# Patient Record
Sex: MALE | Race: WHITE | NOT HISPANIC OR LATINO | Employment: OTHER | URBAN - METROPOLITAN AREA
[De-identification: names, ages, dates, MRNs, and addresses within clinical notes are randomized per-mention and may not be internally consistent; named-entity substitution may affect disease eponyms.]

---

## 2017-01-02 ENCOUNTER — THERAPY VISIT (OUTPATIENT)
Dept: PHYSICAL THERAPY | Facility: CLINIC | Age: 72
End: 2017-01-02
Payer: MEDICARE

## 2017-01-02 DIAGNOSIS — M25.561 ACUTE PAIN OF RIGHT KNEE: Primary | ICD-10-CM

## 2017-01-02 DIAGNOSIS — Z47.89 AFTERCARE FOLLOWING SURGERY OF THE MUSCULOSKELETAL SYSTEM: ICD-10-CM

## 2017-01-02 PROCEDURE — 97140 MANUAL THERAPY 1/> REGIONS: CPT | Mod: GP | Performed by: PHYSICAL THERAPY ASSISTANT

## 2017-01-02 PROCEDURE — 97110 THERAPEUTIC EXERCISES: CPT | Mod: GP | Performed by: PHYSICAL THERAPY ASSISTANT

## 2017-01-07 ENCOUNTER — OFFICE VISIT (OUTPATIENT)
Dept: URGENT CARE | Facility: URGENT CARE | Age: 72
End: 2017-01-07
Payer: MEDICARE

## 2017-01-07 VITALS
OXYGEN SATURATION: 99 % | HEART RATE: 83 BPM | TEMPERATURE: 98 F | BODY MASS INDEX: 27.22 KG/M2 | WEIGHT: 179 LBS | SYSTOLIC BLOOD PRESSURE: 138 MMHG | DIASTOLIC BLOOD PRESSURE: 88 MMHG

## 2017-01-07 DIAGNOSIS — H16.9 KERATITIS: Primary | ICD-10-CM

## 2017-01-07 DIAGNOSIS — H04.123 DRY EYES: ICD-10-CM

## 2017-01-07 PROCEDURE — 99213 OFFICE O/P EST LOW 20 MIN: CPT | Mod: 24 | Performed by: FAMILY MEDICINE

## 2017-01-07 RX ORDER — POLYMYXIN B SULFATE AND TRIMETHOPRIM 1; 10000 MG/ML; [USP'U]/ML
SOLUTION OPHTHALMIC
Qty: 1 BOTTLE | Refills: 0 | Status: SHIPPED | OUTPATIENT
Start: 2017-01-07 | End: 2017-08-15

## 2017-01-07 NOTE — PROGRESS NOTES
SUBJECTIVE:                                                    Taqueria Bone is a 71 year old male who presents to clinic today for the following health issues:      Eye(s) Problem      Duration: 2 days    Description:  Location: right  Pain: YES  Redness: YES  Discharge: YES    Accompanying signs and symptoms: none    History (Trauma, foreign body exposure,): None    Precipitating or alleviating factors (contact use): None    Therapies tried and outcome: lubricant drops with no relief      2 days ago woke up with watery right eye  Patient used lubricant drops  esterday morning seemed to be fine except for dry crusty in the morning  Last night woke up in the middle of the night and the tears were just streaming down   Tried the eyedrops and it actually burned  Patient sat up tried a hot compress  Went back to bed and fell back asleep    Retired  Couple of weeks ago a friend and him was welding some brackets of a running board.  slight photophobia  question feeling of foreign body    denies blurring of vision  denies URI symptoms  Tried supportive treatment no relief  Worsening symptoms hence came in    Problem list, Medication list, Allergies, and Medical/Social/Surgical histories reviewed in [facility] and updated as appropriate.    ROS:  General: negative for fever  EYE: as above  No fevers or chills chest pain or shortness of breath     OBJECTIVE:  /88 mmHg  Pulse 83  Temp(Src) 98  F (36.7  C) (Tympanic)  Wt 179 lb (81.194 kg)  SpO2 99%   General : Awake Alert not in any acute cardiorespiratory distress  Head:       Normocephalic Atraumatic  Eyes:    Pupils equally reactive to light and accomodation. Sclera not icteric. Extra occular muscles intact full and equal. No hyphema, no hypopyon, no ciliary flush. No eyelid swelling or periorbital cellulitis. Mild erythema of  right  conjunctiva.   On fluoroscein dye stanining ? Faint uptake around the limbus of cornea all around   Neurologic: No cranial  nerve deficits.   Psych: Appropriate mood and affect. Pleasant  Skin: patient undressed to level of his/her comfort. No visible concerning lesions.      ASSESSMENT:  No diagnosis found.      ICD-10-CM    1. Keratitis H16.9 trimethoprim-polymyxin b (POLYTRIM) ophthalmic solution     Artificial Tear Ointment (ARTIFICIAL TEARS) OINT   2. Dry eyes H04.123 Artificial Tear Ointment (ARTIFICIAL TEARS) OINT         PLAN:   Unable to confirm keratitis but suspicious based on fluoroscein dye. (? Around the limbus of cornea? Dry eyes)   Recommend being seen in eye clinic for slit lamp exam recommended today at [facility] or in the ER patient declined  He feels that his symptoms are mild and prefers empiric treatment . Risks disucssed.   Some of the problem may be from dry eyes. Continue artifical tear drops but oinmtnet at night time  Prescribed with polytrim to cover possible infection.   Advised about symptoms which might herald more serious problems.    adverse reactions of medication discussed  advised to come back in right away if with any worsening symptoms or if with no relief   aware to come in right away especially if with any blurring of vision, photophobia, pain, feeling of foreign body.   despite treatment plan  patient voiced understanding and had no further questions at this time.        Marisa Ulloa MD

## 2017-01-07 NOTE — NURSING NOTE
"Chief Complaint   Patient presents with     Eye Problem       Initial /88 mmHg  Pulse 83  Temp(Src) 98  F (36.7  C) (Tympanic)  Wt 179 lb (81.194 kg)  SpO2 99% Estimated body mass index is 27.22 kg/(m^2) as calculated from the following:    Height as of 12/29/16: 5' 8\" (1.727 m).    Weight as of this encounter: 179 lb (81.194 kg).  BP completed using cuff size: IZZY Zamora    "

## 2017-01-07 NOTE — MR AVS SNAPSHOT
After Visit Summary   1/7/2017    Taqueria Bone    MRN: [id number]           Patient Information     Date Of Birth          1945        Visit Information        Provider Department      1/7/2017 10:20 AM Marisa Ulloa MD [facility]        Today's Diagnoses     Keratitis    -  1     Dry eyes            Follow-ups after your visit        Your next 10 appointments already scheduled     Jan 17, 2017 11:20 AM   [facility] with [facility] Fernandez PT   [facility] [place] [facility] ([facility] [place])    [address]  [address]  [place] [address] [phone]              Who to contact     If you have questions or need follow up information about today's clinic visit or your schedule please contact [facility] directly at [phone].  Normal or non-critical lab and imaging results will be communicated to you by MyChart, letter or phone within 4 business days after the clinic has received the results. If you do not hear from us within 7 days, please contact the clinic through [employer] or phone. If you have a critical or abnormal lab result, we will notify you by phone as soon as possible.  Submit refill requests through [employer] or call your pharmacy and they will forward the refill request to us. Please allow 3 business days for your refill to be completed.          Additional Information About Your Visit        MyChart Information     [employer] gives you secure access to your electronic health record. If you see a primary care provider, you can also send messages to your care team and make appointments. If you have questions, please call your primary care clinic.  If you do not have a primary care provider, please call [phone] and they will assist you.        Care EveryWhere ID     This is your Care EveryWhere ID. This could be used by other organizations to access your [facility] medical records  [id number]        Your Vitals  Were     Pulse Temperature Pulse Oximetry             83 98  F (36.7  C) (Tympanic) 99%          Blood Pressure from Last 3 Encounters:   01/07/17 138/88   12/16/16 126/93   12/05/16 124/66    Weight from Last 3 Encounters:   01/07/17 179 lb (81.194 kg)   12/29/16 177 lb (80.287 kg)   12/05/16 177 lb (80.287 kg)              Today, you had the following     No orders found for display         Today's Medication Changes          These changes are accurate as of: 1/7/17 10:42 PM.  If you have any questions, ask your nurse or doctor.               Start taking these medicines.        Dose/Directions    ARTIFICIAL TEARS Oint   Used for:  Keratitis, Dry eyes   Started by:  Marisa Ulloa MD        4 x a day as needed and at bedtime.   Quantity:  1 Tube   Refills:  0       trimethoprim-polymyxin b ophthalmic solution   Commonly known as:  POLYTRIM   Used for:  Keratitis   Started by:  Marisa Ulloa MD        1 drop every 2 hours while awake first day then 1 to 2 drops every 6 hours until 2 days for 7 days   Quantity:  1 Bottle   Refills:  0            Where to get your medicines      These medications were sent to [address] [address]     Phone:  [phone]    - ARTIFICIAL TEARS Oint  - trimethoprim-polymyxin b ophthalmic solution             Primary Care Provider Office Phone # Fax #    [facility] [phone]       No address on file        Thank you!     Thank you for choosing [facility]  for your care. Our goal is always to provide you with excellent care. Hearing back from our patients is one way we can continue to improve our services. Please take a few minutes to complete the written survey that you may receive in the mail after your visit with us. Thank you!             Your Updated Medication List - Protect others around you: Learn how to safely use, store and  throw away your medicines at www.disposemymeds.org.          This list is accurate as of: 1/7/17 10:42 PM.  Always use your most recent med list.                   Brand Name Dispense Instructions for use    amLODIPine 5 MG tablet    NORVASC    180 tablet    Take 2 tablets (10 mg) by mouth daily       ARTIFICIAL TEARS Oint     1 Tube    4 x a day as needed and at bedtime.       aspirin 325 MG EC tablet      Take 325 mg by mouth daily. 1/2 pill a day.       cetirizine 10 MG tablet    zyrTEC     Take 5 mg by mouth 2 times daily       FISH OIL      1 capsule.       fluticasone 50 MCG/ACT spray    FLONASE    16 g    Spray 2 sprays into both nostrils daily as needed for rhinitis       Glucos-Robert-MSM-Rs-J-VyAa-SeCu Tabs      one daily       HYDROcodone-acetaminophen 5-325 MG per tablet    NORCO    40 tablet    Take 1-2 tablets by mouth every 4 hours as needed for other (Moderate to Severe Pain)       IBUPROFEN PO      Take 400 mg by mouth       LORazepam 0.5 MG tablet    ATIVAN    30 tablet    Take 1 tablet (0.5 mg) by mouth daily as needed       meclizine 25 MG tablet    ANTIVERT    30 tablet    Take 1 tablet (25 mg) by mouth 3 times daily as needed       MULTIVITAMIN TABS   OR      1 TABLET DAILY       olopatadine 0.1 % ophthalmic solution    PATANOL    15 mL    Place 1 drop into both eyes 2 times daily.       omeprazole 20 MG CR capsule    priLOSEC    90 capsule    Take 1 capsule (20 mg) by mouth daily       PROSTATE PO      QD       ranitidine 300 MG tablet    ZANTAC    90 tablet    Take 1 tablet (300 mg) by mouth At Bedtime       triamcinolone 0.1 % cream    KENALOG    30 g    Apply sparingly to affected area three times daily for 14 days.       trimethoprim-polymyxin b ophthalmic solution    POLYTRIM    1 Bottle    1 drop every 2 hours while awake first day then 1 to 2 drops every 6 hours until 2 days for 7 days

## 2017-01-17 ENCOUNTER — THERAPY VISIT (OUTPATIENT)
Dept: PHYSICAL THERAPY | Facility: CLINIC | Age: 72
End: 2017-01-17
Payer: MEDICARE

## 2017-01-17 DIAGNOSIS — Z47.89 AFTERCARE FOLLOWING SURGERY OF THE MUSCULOSKELETAL SYSTEM: ICD-10-CM

## 2017-01-17 DIAGNOSIS — M25.561 ACUTE PAIN OF RIGHT KNEE: Primary | ICD-10-CM

## 2017-01-17 PROCEDURE — 97112 NEUROMUSCULAR REEDUCATION: CPT | Mod: GP | Performed by: PHYSICAL THERAPIST

## 2017-01-17 PROCEDURE — 97530 THERAPEUTIC ACTIVITIES: CPT | Mod: GP | Performed by: PHYSICAL THERAPIST

## 2017-01-17 PROCEDURE — 97110 THERAPEUTIC EXERCISES: CPT | Mod: GP | Performed by: PHYSICAL THERAPIST

## 2017-01-17 NOTE — PROGRESS NOTES
"Subjective:    HPI                    Objective:    System    Physical Exam    General     ROS    Assessment/Plan:      SUBJECTIVE  Subjective: Pt reports he is still having pain when he \"steps wrong.\"  Doesn't have nearly as much pain as initially after surgery.   Current Pain level: 3/10   Changes in function:  Yes (See Goal flowsheet attached for changes in current functional level)     Adverse reaction to treatment or activity:  None    OBJECTIVE  Objective: AROM 0-130, PROM flexion 135.  Trendelenburg pattern of gait without device.  Pt can control 4\" step down but not 8\".     ASSESSMENT  Taqueria continues to require intervention to meet STG and LTG's: PT  Patient is progressing as expected.  Response to therapy has shown an improvement in  function  Progress made towards STG/LTG?  Yes (See Goal flowsheet attached for updates on achievement of STG and LTG)    PLAN  Current treatment program is being advanced to more complex exercises.    PTA/ATC plan:  N/A    Please refer to the daily flowsheet for treatment today, total treatment time and time spent performing 1:1 timed codes.              "

## 2017-02-14 ENCOUNTER — THERAPY VISIT (OUTPATIENT)
Dept: PHYSICAL THERAPY | Facility: CLINIC | Age: 72
End: 2017-02-14
Payer: MEDICARE

## 2017-02-14 DIAGNOSIS — M25.561 ACUTE PAIN OF RIGHT KNEE: ICD-10-CM

## 2017-02-14 DIAGNOSIS — Z47.89 AFTERCARE FOLLOWING SURGERY OF THE MUSCULOSKELETAL SYSTEM: ICD-10-CM

## 2017-02-14 PROCEDURE — G8979 MOBILITY GOAL STATUS: HCPCS | Mod: GP | Performed by: PHYSICAL THERAPIST

## 2017-02-14 PROCEDURE — G8980 MOBILITY D/C STATUS: HCPCS | Mod: GP | Performed by: PHYSICAL THERAPIST

## 2017-02-14 PROCEDURE — 97112 NEUROMUSCULAR REEDUCATION: CPT | Mod: GP | Performed by: PHYSICAL THERAPIST

## 2017-02-14 PROCEDURE — 97110 THERAPEUTIC EXERCISES: CPT | Mod: GP | Performed by: PHYSICAL THERAPIST

## 2017-02-14 ASSESSMENT — ACTIVITIES OF DAILY LIVING (ADL)
PAIN: I HAVE THE SYMPTOM BUT IT DOES NOT AFFECT MY ACTIVITY
STIFFNESS: I HAVE THE SYMPTOM BUT IT DOES NOT AFFECT MY ACTIVITY
STAND: ACTIVITY IS NOT DIFFICULT
KNEE_ACTIVITY_OF_DAILY_LIVING_SUM: 62
HOW_WOULD_YOU_RATE_THE_OVERALL_FUNCTION_OF_YOUR_KNEE_DURING_YOUR_USUAL_DAILY_ACTIVITIES?: NEARLY NORMAL
GO UP STAIRS: ACTIVITY IS MINIMALLY DIFFICULT
KNEE_ACTIVITY_OF_DAILY_LIVING_SCORE: 88.57
SIT WITH YOUR KNEE BENT: ACTIVITY IS MINIMALLY DIFFICULT
KNEEL ON THE FRONT OF YOUR KNEE: ACTIVITY IS NOT DIFFICULT
WEAKNESS: I HAVE THE SYMPTOM BUT IT DOES NOT AFFECT MY ACTIVITY
SWELLING: I DO NOT HAVE THE SYMPTOM
GO DOWN STAIRS: ACTIVITY IS MINIMALLY DIFFICULT
WALK: ACTIVITY IS NOT DIFFICULT
RAW_SCORE: 62
AS_A_RESULT_OF_YOUR_KNEE_INJURY,_HOW_WOULD_YOU_RATE_YOUR_CURRENT_LEVEL_OF_DAILY_ACTIVITY?: NEARLY NORMAL
GIVING WAY, BUCKLING OR SHIFTING OF KNEE: I HAVE THE SYMPTOM BUT IT DOES NOT AFFECT MY ACTIVITY
SQUAT: ACTIVITY IS MINIMALLY DIFFICULT
HOW_WOULD_YOU_RATE_THE_CURRENT_FUNCTION_OF_YOUR_KNEE_DURING_YOUR_USUAL_DAILY_ACTIVITIES_ON_A_SCALE_FROM_0_TO_100_WITH_100_BEING_YOUR_LEVEL_OF_KNEE_FUNCTION_PRIOR_TO_YOUR_INJURY_AND_0_BEING_THE_INABILITY_TO_PERFORM_ANY_OF_YOUR_USUAL_DAILY_ACTIVITIES?: 90
RISE FROM A CHAIR: ACTIVITY IS NOT DIFFICULT
LIMPING: I DO NOT HAVE THE SYMPTOM

## 2017-02-14 NOTE — PROGRESS NOTES
Subjective:    HPI       Knee Activity of Daily Living Score: 88.57            Objective:    System    Physical Exam    General     ROS    Assessment/Plan:      DISCHARGE REPORT    Progress reporting period is from 12/31/2016 to today.       SUBJECTIVE  Subjective: Pt reports doing well.  Has found snowshoeing to be very beneficial.  A little tougher to cross country ski but is overall noting definite progress.  Walking is no longer an issue, going up stairs is fine, but down stairs can still be a little tricky.    Current Pain level: 2/10.     Initial Pain level: 4/10.   Changes in function:  Yes (See Goal flowsheet attached for changes in current functional level)  Adverse reaction to treatment or activity: None    OBJECTIVE  Objective: AROM 0-0-132.  No discomfort resisted knee flexion 5-/5, extension 4+/5.  SLS 6 seconds R with eyes open.     ASSESSMENT/PLAN  Updated problem list and treatment plan: Diagnosis 1:  S/p R knee scope -- home program  STG/LTGs have been met or progress has been made towards goals:  Yes (See Goal flow sheet completed today.)  Assessment of Progress: The patient's condition is improving.  Self Management Plans:  Patient is independent in a home treatment program.  I have re-evaluated this patient and find that the nature, scope, duration and intensity of the therapy is appropriate for the medical condition of the patient.  Taqueria continues to require the following intervention to meet STG and LTG's:  PT intervention is no longer required to meet STG/LTG.    Recommendations:  Given progress, pt agrees discharge to [facility] but will let me know if there are further issues.    Please refer to the daily flowsheet for treatment today, total treatment time and time spent performing 1:1 timed codes.

## 2017-02-14 NOTE — MR AVS SNAPSHOT
After Visit Summary   2/14/2017    Taqueria Bone    MRN: [id number]           Patient Information     Date Of Birth          1945        Visit Information        Provider Department      2/14/2017 2:30 PM Gonzalez Fernandez PT [facility] [place] [facility]        Today's Diagnoses     Acute pain of right knee        Aftercare following surgery of the musculoskeletal system           Follow-ups after your visit        Who to contact     If you have questions or need follow up information about today's clinic visit or your schedule please contact [facility] [facility] directly at [phone].  Normal or non-critical lab and imaging results will be communicated to you by [employer], letter or phone within 4 business days after the clinic has received the results. If you do not hear from us within 7 days, please contact the clinic through [employer] or phone. If you have a critical or abnormal lab result, we will notify you by phone as soon as possible.  Submit refill requests through [employer] or call your pharmacy and they will forward the refill request to us. Please allow 3 business days for your refill to be completed.          Additional Information About Your Visit        MyChart Information     [employer] gives you secure access to your electronic health record. If you see a primary care provider, you can also send messages to your care team and make appointments. If you have questions, please call your primary care clinic.  If you do not have a primary care provider, please call [phone] and they will assist you.        Care EveryWhere ID     This is your Care EveryWhere ID. This could be used by other organizations to access your [facility] medical records  [id number]         Blood Pressure from Last 3 Encounters:   01/07/17 138/88   12/16/16 (!) 126/93   12/05/16 124/66    Weight from Last 3 Encounters:   01/07/17 81.2 kg (179 lb)   12/29/16 80.3 kg (177 lb)    12/05/16 80.3 kg (177 lb)              We Performed the Following     Neuromuscular Re-Education     Therapeutic Exercises        Primary Care Provider Office Phone # Fax #    Cathi Esteves [place] [phone]       No address on file        Thank you!     Thank you for choosing [facility]  for your care. Our goal is always to provide you with excellent care. Hearing back from our patients is one way we can continue to improve our services. Please take a few minutes to complete the written survey that you may receive in the mail after your visit with us. Thank you!             Your Updated Medication List - Protect others around you: Learn how to safely use, store and throw away your medicines at www.disposemymeds.org.          This list is accurate as of: 2/14/17  3:02 PM.  Always use your most recent med list.                   Brand Name Dispense Instructions for use    amLODIPine 5 MG tablet    NORVASC    180 tablet    Take 2 tablets (10 mg) by mouth daily       ARTIFICIAL TEARS Oint     1 Tube    4 x a day as needed and at bedtime.       aspirin 325 MG EC tablet      Take 325 mg by mouth daily. 1/2 pill a day.       cetirizine 10 MG tablet    zyrTEC     Take 5 mg by mouth 2 times daily       FISH OIL      1 capsule.       fluticasone 50 MCG/ACT spray    FLONASE    16 g    Spray 2 sprays into both nostrils daily as needed for rhinitis       Glucos-Robert-MSM-Pq-H-DnHb-SeCu Tabs      one daily       HYDROcodone-acetaminophen 5-325 MG per tablet    NORCO    40 tablet    Take 1-2 tablets by mouth every 4 hours as needed for other (Moderate to Severe Pain)       IBUPROFEN PO      Take 400 mg by mouth       LORazepam 0.5 MG tablet    ATIVAN    30 tablet    Take 1 tablet (0.5 mg) by mouth daily as needed       meclizine 25 MG tablet    ANTIVERT    30 tablet    Take 1 tablet (25 mg) by mouth 3 times daily as needed       MULTIVITAMIN TABS   OR      1 TABLET DAILY        olopatadine 0.1 % ophthalmic solution    PATANOL    15 mL    Place 1 drop into both eyes 2 times daily.       omeprazole 20 MG CR capsule    priLOSEC    90 capsule    Take 1 capsule (20 mg) by mouth daily       PROSTATE PO      QD       ranitidine 300 MG tablet    ZANTAC    90 tablet    Take 1 tablet (300 mg) by mouth At Bedtime       triamcinolone 0.1 % cream    KENALOG    30 g    Apply sparingly to affected area three times daily for 14 days.       trimethoprim-polymyxin b ophthalmic solution    POLYTRIM    1 Bottle    1 drop every 2 hours while awake first day then 1 to 2 drops every 6 hours until 2 days for 7 days

## 2017-05-19 ENCOUNTER — TRANSFERRED RECORDS (OUTPATIENT)
Dept: HEALTH INFORMATION MANAGEMENT | Facility: CLINIC | Age: 72
End: 2017-05-19

## 2017-05-19 LAB
ALT SERPL-CCNC: 39 U/L (ref 13–61)
AST SERPL-CCNC: 26 U/L (ref 15–37)
CHOLEST SERPL-MCNC: 206 MG/DL (ref 0–200)
CREAT SERPL-MCNC: 0.8 MG/DL (ref 0.7–1.2)
GLUCOSE SERPL-MCNC: 84 MG/DL (ref 74–106)
HEP C HIM: NORMAL
POTASSIUM SERPL-SCNC: 3.9 MMOL/L (ref 3.5–5)
TRIGL SERPL-MCNC: 140 MG/DL (ref 0–150)
TSH SERPL-ACNC: 2.08 UIU/ML (ref 0.3–5)

## 2017-06-30 DIAGNOSIS — I10 ESSENTIAL HYPERTENSION WITH GOAL BLOOD PRESSURE LESS THAN 140/90: ICD-10-CM

## 2017-07-03 DIAGNOSIS — R09.81 SINUS CONGESTION: ICD-10-CM

## 2017-07-03 NOTE — TELEPHONE ENCOUNTER
Last office visit 12/2016, Pre op.  Last office visit for physical 6/2016.   Please advise this is a mailorder pharmacy, ? 30 day local prescription?  Please advise    BP Readings from Last 6 Encounters:   01/07/17 138/88   12/16/16 (!) 126/93   12/05/16 124/66   06/30/16 116/67   05/20/16 135/90   03/22/16 (!) 153/92     Dianne Sawant RN

## 2017-07-05 RX ORDER — AMLODIPINE BESYLATE 5 MG/1
TABLET ORAL
Qty: 180 TABLET | Refills: 2 | OUTPATIENT
Start: 2017-07-05

## 2017-07-05 RX ORDER — FLUTICASONE PROPIONATE 50 MCG
2 SPRAY, SUSPENSION (ML) NASAL DAILY PRN
Qty: 16 G | Refills: 1 | Status: SHIPPED | OUTPATIENT
Start: 2017-07-05 | End: 2017-08-15

## 2017-07-05 NOTE — TELEPHONE ENCOUNTER
Yes, let get him scheduled for a complete physical exam soon and get him wnough prescription(s) to last until then.  Myron Bob MD

## 2017-07-07 RX ORDER — AMLODIPINE BESYLATE 5 MG/1
10 TABLET ORAL DAILY
Qty: 180 TABLET | Refills: 0 | Status: SHIPPED | OUTPATIENT
Start: 2017-07-07 | End: 2017-08-15

## 2017-07-07 NOTE — TELEPHONE ENCOUNTER
Refilled per [facility] Refill Protocol and provider note below. Pt appointment is scheduled 8/15/17.    Adriana Pinzon RN

## 2017-07-07 NOTE — TELEPHONE ENCOUNTER
Spoke to patient and scheduled appointment. Asked patient which local pharmacy he would like Rx to go to and he states just [facility]Flavia Martinez,

## 2017-08-01 DIAGNOSIS — K21.9 GASTROESOPHAGEAL REFLUX DISEASE WITHOUT ESOPHAGITIS: ICD-10-CM

## 2017-08-01 DIAGNOSIS — R07.0 THROAT PAIN: ICD-10-CM

## 2017-08-14 NOTE — PATIENT INSTRUCTIONS
Preventive Health Recommendations:       Male Ages 65 and over    Yearly exam:             See your health care provider every year in order to  o   Review health changes.   o   Discuss preventive care.    o   Review your medicines if your doctor has prescribed any.    Talk with your health care provider about whether you should have a test to screen for prostate cancer (PSA).    Every 3 years, have a diabetes test (fasting glucose). If you are at risk for diabetes, you should have this test more often.    Every 5 years, have a cholesterol test. Have this test more often if you are at risk for high cholesterol or heart disease.     Every 10 years, have a colonoscopy. Or, have a yearly FIT test (stool test). These exams will check for colon cancer.    Talk to with your health care provider about screening for Abdominal Aortic Aneurysm if you have a family history of AAA or have a history of smoking.  Shots:     Get a flu shot each year.     Get a tetanus shot every 10 years.     Talk to your doctor about your pneumonia vaccines. There are now two you should receive - Pneumovax (PPSV 23) and Prevnar (PCV 13).    Talk to your doctor about a shingles vaccine.     Talk to your doctor about the hepatitis B vaccine.  Nutrition:     Eat at least 5 servings of fruits and vegetables each day.     Eat whole-grain bread, whole-wheat pasta and brown rice instead of white grains and rice.     Talk to your doctor about Calcium and Vitamin D.   Lifestyle    Exercise for at least 150 minutes a week (30 minutes a day, 5 days a week). This will help you control your weight and prevent disease.     Limit alcohol to one drink per day.     No smoking.     Wear sunscreen to prevent skin cancer.     See your dentist every six months for an exam and cleaning.     See your eye doctor every 1 to 2 years to screen for conditions such as glaucoma, macular degeneration and cataracts.    Please call our [facility] Scheduling Line at  [phone] to schedule your:  Ultrasound

## 2017-08-14 NOTE — PROGRESS NOTES
SUBJECTIVE:   Taqueria Bone is a 71 year old male who presents for Preventive Visit.    Are you in the first 12 months of your Medicare Part B coverage?  No    Healthy Habits:  Annual Exam:  Getting at least 3 servings of Calcium per day:: Yes  Bi-annual eye exam:: Yes  Dental care twice a year:: Yes  Sleep apnea or symptoms of sleep apnea:: Daytime drowsiness, Excessive snoring  Diet:: Carbohydrate counting  Frequency of exercise:: 6-7 days/week  Taking medications regularly:: Yes  Additional concerns today:: No  PHQ-2 Score: 2  Duration of exercise:: 45-60 minutes    COGNITIVE SCREEN  1) Repeat 3 items (Banana, Sunrise, Chair)      2) Clock draw:   NORMAL  3) 3 item recall:   Recalls 2 objects   Results: NORMAL clock, 1-2 items recalled: COGNITIVE IMPAIRMENT LESS LIKELY    Mini-CogTM Copyright [employer]. Licensed by the author for use in [facility]; reprinted with permission (albert@[email or web address]). All rights reserved.                  Reviewed and updated as needed this visit by clinical staff  Tobacco  Allergies  Meds         Reviewed and updated as needed this visit by Provider        Social History   Substance Use Topics     Smoking status: Former Smoker     Quit date: 10/31/1979     Smokeless tobacco: Former User      Comment: 1979     Alcohol use 0.5 oz/week     1 Cans of beer per week      Comment: 1-2 beer daily       The patient does not drink >3 drinks per day nor >7 drinks per week.    Today's PHQ-2 Score:   PHQ-2 ( 1999 Pfizer) 8/15/2017 3/22/2016   Q1: Little interest or pleasure in doing things 2 0   Q2: Feeling down, depressed or hopeless 0 0   PHQ-2 Score 2 0   Q1: Little interest or pleasure in doing things More than half the days -   Q2: Feeling down, depressed or hopeless Not at all -   PHQ-2 Score 2 -   Some recent data might be hidden         Do you feel safe in your environment - Yes    Do you have a Health Care Directive?: Yes: Patient states has Advance Directive and will  "bring in a copy to clinic.    Current providers sharing in care for this patient include: Patient Care Team:  Danielito Kruse, Clinic as PCP - General      Hearing impairment: Yes, he wears hearing aides    Ability to successfully perform activities of daily living: Yes, no assistance needed     Fall risk:  Fallen 2 or more times in the past year?: No  Any fall with injury in the past year?: No      Home safety:  none identified  click delete button to remove this line now    The following health maintenance items are reviewed in Epic and correct as of today:  Health Maintenance   Topic Date Due     URINE DRUG SCREEN Q1 YR  09/05/1961     HEPATITIS C SCREENING  09/05/1964     AORTIC ANEURYSM SCREENING (SYSTEM ASSIGNED)  09/05/2011     BMP Q1 YR  10/30/2016     INFLUENZA VACCINE (SYSTEM ASSIGNED)  09/01/2017     FALL RISK ASSESSMENT  08/15/2018     KEDAR QUESTIONNAIRE 1 YEAR  08/15/2018     PHQ-9 Q1YR  08/15/2018     TETANUS IMMUNIZATION (SYSTEM ASSIGNED)  11/13/2018     ADVANCE DIRECTIVE PLANNING Q5 YRS  06/17/2019     LIPID SCREEN Q5 YR MALE (SYSTEM ASSIGNED)  06/20/2019     COLON CANCER SCREEN (SYSTEM ASSIGNED)  03/30/2022     PNEUMOCOCCAL  Completed           OBJECTIVE:   /80  Pulse 76  Temp 97.5  F (36.4  C) (Oral)  Ht 5' 7\" (1.702 m)  Wt 176 lb (79.8 kg)  SpO2 99%  BMI 27.57 kg/m2 Estimated body mass index is 27.57 kg/(m^2) as calculated from the following:    Height as of this encounter: 5' 7\" (1.702 m).    Weight as of this encounter: 176 lb (79.8 kg).      ASSESSMENT / PLAN:       ICD-10-CM    1. Personal history of tobacco use, presenting hazards to health Z87.891 US abdominal aorta limited   2. Throat pain R07.0 ranitidine (ZANTAC) 150 MG tablet   3. Gastroesophageal reflux disease without esophagitis K21.9 ranitidine (ZANTAC) 150 MG tablet   4. Essential hypertension with goal blood pressure less than 140/90 I10 amLODIPine (NORVASC) 5 MG tablet   5. Sinus congestion R09.81 fluticasone (FLONASE) 50 " "MCG/ACT spray   6. 10 year risk of MI or stroke 7.5% or greater, 18% in August 2017 Z91.89 atorvastatin (LIPITOR) 40 MG tablet   7. Special screening for malignant neoplasms, colon Z12.11 GASTROENTEROLOGY ADULT REF PROCEDURE ONLY   8. Family history of colon cancer Z80.0 GASTROENTEROLOGY ADULT REF PROCEDURE ONLY       End of Life Planning:  Patient currently has an advanced directive: No.  I have verified the patient's ablity to prepare an advanced directive/make health care decisions.  Literature was provided to assist patient in preparing an advanced directive.    COUNSELING:  Reviewed preventive health counseling, as reflected in patient instructions       Consider AAA screening for ages 65-75 and smoking history  Colonoscopy ordered       Regular exercise       Vision screening       Dental care       Hepatitis C screening       Consider lung cancer screening for ages 55-80 years and 30 pack-year smoking history n/a         Colon cancer screening       Osteoporosis Prevention/Bone Health          Estimated body mass index is 27.57 kg/(m^2) as calculated from the following:    Height as of this encounter: 5' 7\" (1.702 m).    Weight as of this encounter: 176 lb (79.8 kg).     reports that he quit smoking about 37 years ago. He has quit using smokeless tobacco.        Appropriate preventive services were discussed with this patient, including applicable screening as appropriate for cardiovascular disease, diabetes, osteopenia/osteoporosis, and glaucoma.  As appropriate for age/gender, discussed screening for colorectal cancer, prostate cancer, breast cancer, and cervical cancer. Checklist reviewing preventive services available has been given to the patient.    Reviewed patients plan of care and provided an AVS. The Basic Care Plan (routine screening as documented in Health Maintenance) for Taqueria meets the Care Plan requirement. This Care Plan has been established and reviewed with the Patient.    Counseling " Resources:  ATP IV Guidelines  Pooled Cohorts Equation Calculator  Breast Cancer Risk Calculator  FRAX Risk Assessment  ICSI Preventive Guidelines  Dietary Guidelines for Americans, 2010  [employer]'s MyPlate  ASA Prophylaxis  Lung CA Screening    Myron Bob MD  [facility] submitted by the patient on 8/15/2017   --------------------------------------------------------------------------------------------------------------------------------------    SUBJECTIVE:  Taqueria Bone is a 71 year old male who presents to the clinic today for a routine physical exam.    [facility]    [facility]    Total Cholesterol: 206  TRIGLYCERIDES: 140  LDL: 121  HDL: 63    Cholesterol   Date Value Ref Range Status   06/20/2014 168 <200 mg/dL Final     Comment:     LDL Cholesterol is the primary guide to therapy.   The NCEP recommends further evaluation of: patients with cholesterol greater   than 200 mg/dL if additional risk factors are present, cholesterol greater   than   240 mg/dL, triglycerides greater than 150 mg/dL, or HDL less than 40 mg/dL.     01/09/2012 193 0 - 200 mg/dL Final     Comment:     LDL Cholesterol is the primary guide to therapy.   The NCEP recommends further evaluation of: patients with cholesterol greater   than 200 mg/dL if additional risk factors are present, cholesterol greater   than   240 mg/dL, triglycerides greater than 150 mg/dL, or HDL less than 40 mg/dL.     HDL Cholesterol   Date Value Ref Range Status   06/20/2014 52 >40 mg/dL Final   01/09/2012 45 40 - 110 mg/dL Final     LDL Cholesterol Calculated   Date Value Ref Range Status   06/20/2014 104 0 - 129 mg/dL Final     Comment:     LDL Cholesterol is the primary guide to therapy: LDL-cholesterol goal in high   risk patients is <100 mg/dL and in very high risk patients is <70 mg/dL.     01/09/2012 134 (H) 0 - 129 mg/dL Final      Comment:     LDL Cholesterol is the primary guide to therapy: LDL-cholesterol goal in high   risk patients is <100 mg/dL and in very high risk patients is <70 mg/dL.     Triglycerides   Date Value Ref Range Status   06/20/2014 58 0 - 150 mg/dL Final   01/09/2012 69 0 - 150 mg/dL Final     Cholesterol/HDL Ratio   Date Value Ref Range Status   06/20/2014 3.2 0.0 - 5.0 Final   01/09/2012 4.3 0.0 - 5.0 Final     The patient's last fasting lipid panel was done 3 months ago and the results are listed above    18.0% by the [employer] ATHEROSCLEROTIC CARDIOVASCULAR DISEASE mohinder    The ASCVD Risk score (Burbanksarabjit ESTRADA Jr, et al., 2013) failed to calculate for the following reasons:    Cannot find a previous HDL lab    Cannot find a previous total cholesterol lab        The patient reports that he has been treated for high blood pressure.    The patient reports that he does take a daily aspirin.    Lab Results   Component Value Date    HCVAB Negative 07/22/2009     The patient reports that he has been screened for Hepatitis C    (Screen all baby boomers once per CDC-- the generation born from 1945 through 1965)    Immunization History   Administered Date(s) Administered     Influenza (H1N1) 01/07/2010     Influenza (High Dose) 3 valent vaccine 10/25/2011, 11/23/2015, 12/05/2016     Influenza (IIV3) 10/01/2008, 09/23/2009, 09/28/2010, 10/12/2012, 10/20/2013     LYMERIX 03/23/1999, 04/22/1999     Mantoux 02/15/2005     Pneumococcal (PCV 13) 11/23/2015     Pneumococcal 23 valent 11/02/2005, 10/25/2011     TD (ADULT, 7+) 12/16/1997     Tdap (Adacel,Boostrix) 11/13/2008     Twinrix A/B 01/30/2012, 03/05/2012, 11/13/2012     Zoster vaccine, live 01/16/2012     The patient's believes that his last tetanus shot was given 9 year(s) ago.   The patient believes that he has had a Zostavax in the past  The patient believes that he has had a PPSV23 in the past.  The patient believes that he has had a PCV13 in the past.  The patient believes that he  has had a seasonal flu vaccination this fall or winter.  The patient would like to have a no vaccinations today      No results found for this or any previous visit.]   The patient reports a family history of colon cancer.  The patient reports that he has had a colonoscopy. His  last colonoscopy was in 2012 and he  report that is was abnormal. The patient was told to have this repeated in 5 years.    The patient reports that he and his wife or partner are not using contraception as she has gone through menopause.      The patient reports that he eats or drinks 1-2 servings of dairy products per day. He does take a calcium supplement once daily.  The patient reports that he has dental appointments approximately every 6 months.  The patient reports that he  has an eye examination approximately every 1.0 year(s).    Do you currently smoke? No quit in 1979  How many years have you smoked? 20 years    How many packs per day did you smoke on average? 3/4 ppd  (if more than 30 pack year history and the patient is age 55-80 consider ordering an annual low dose radiation lung CT to screen for cancer)  (Do not order if patient has quit more than 15 years ago or has a health condition that limits life expectancy or could not tolerate curative lung surgery)  Are you interested having a lung CT to screen for lung cancer? N/A    If the patient has smoked more that 100 cigarettes, has the patient had an imaging study (US or CT) for an AAA between the ages of 65 and 75? No: .              Patient Active Problem List   Diagnosis     Anxiety     Chronic nonallergic rhinitis     CARDIOVASCULAR SCREENING; LDL GOAL LESS THAN 130     GERD (gastroesophageal reflux disease)     Advanced directives, counseling/discussion     Insomnia     Medial epicondylitis     Episodic recurrent vertigo     BPH (benign prostatic hyperplasia)     Seasonal allergic rhinitis     Essential hypertension with goal blood pressure less than 140/90     Chronic low  back pain     Right knee pain       Past Surgical History:   Procedure Laterality Date     ------------OTHER-------------  7/14/10    YAG CAPSULOTOMY OS     ARTHROSCOPY KNEE Right 12/16/2016    Procedure: ARTHROSCOPY KNEE;  Surgeon: Jose Stone MD;  Location: [facility] OR     C SKIN TISSUE PROCEDURE UNLISTED      cysts removed neck / tailbone / back     COLONOSCOPY  3-30-12    Return in 5 yrs.      ENDOSCOPY  3-10-11     TONSILLECTOMY & ADENOIDECTOMY         Family History   Problem Relation Age of Onset     CANCER Father      colon, spread to liver     DIABETES Father      DIABETES Mother      Hypertension Mother      Thyroid Disease Mother      Glaucoma Mother      HEART DISEASE Mother 91     MI      CEREBROVASCULAR DISEASE Maternal Grandfather      DIABETES Paternal Grandfather      Hypertension Brother        Social History     Social History     Marital status:      Spouse name: N/A     Number of children: N/A     Years of education: N/A     Occupational History     Not on file.     Social History Main Topics     Smoking status: Former Smoker     Quit date: 10/31/1979     Smokeless tobacco: Former User      Comment: 1979     Alcohol use 0.5 oz/week     1 Cans of beer per week      Comment: 1-2 beer daily     Drug use: No     Sexual activity: Yes     Partners: Female     Other Topics Concern     Parent/Sibling W/ Cabg, Mi Or Angioplasty Before 65f 55m? No     Social History Narrative       Current Outpatient Prescriptions   Medication Sig Dispense Refill     ranitidine (ZANTAC) 300 MG tablet TAKE 1 TABLET AT BEDTIME 90 tablet 1     amLODIPine (NORVASC) 5 MG tablet Take 2 tablets (10 mg) by mouth daily 180 tablet 0     fluticasone (FLONASE) 50 MCG/ACT spray Spray 2 sprays into both nostrils daily as needed for rhinitis 16 g 1     Artificial Tear Ointment (ARTIFICIAL TEARS) OINT 4 x a day as needed and at bedtime. 1 Tube 0     cetirizine (ZYRTEC) 10 MG tablet Take 5 mg by mouth 2 times daily        "omeprazole (PRILOSEC) 20 MG capsule Take 1 capsule (20 mg) by mouth daily 90 capsule 3     LORazepam (ATIVAN) 0.5 MG tablet Take 1 tablet (0.5 mg) by mouth daily as needed 30 tablet 0     triamcinolone (KENALOG) 0.1 % cream Apply sparingly to affected area three times daily for 14 days. 30 g 1     meclizine (ANTIVERT) 25 MG tablet Take 1 tablet (25 mg) by mouth 3 times daily as needed 30 tablet 1     olopatadine (PATANOL) 0.1 % ophthalmic solution Place 1 drop into both eyes 2 times daily. 15 mL 1     FISH OIL 1 capsule.       GLUCOS-AMANDA-[id number]-[id number] PO TABS one daily       PROSTATE PO QD       aspirin 325 MG EC tablet Take 325 mg by mouth daily. 1/2 pill a day.        MULTIVITAMIN TABS   OR 1 TABLET DAILY             PHYSICAL EXAMINATION:  Blood pressure 123/80, pulse 76, temperature 97.5  F (36.4  C), temperature source Oral, height 5' 7\" (1.702 m), weight 176 lb (79.8 kg), SpO2 99 %.  General appearance - healthy, alert and no distress  Skin - Skin color, texture, turgor normal. No rashes or lesions.  Head - Normocephalic. No masses, lesions, tenderness or abnormalities  Eyes - conjunctivae/corneas clear. PERRL, EOM's intact. Fundi benign  Ears - External ears normal. Canals clear. TM's normal.  Nose/Sinuses - Nares normal. Septum midline. Mucosa normal. No drainage or sinus tenderness.  Oropharynx - Lips, mucosa, and tongue normal. Teeth and gums normal.  Neck - Neck supple. No adenopathy. Thyroid symmetric, normal size,  Lungs - Percussion normal. Good diaphragmatic excursion. Lungs clear  Heart - PMI normal. No lifts, heaves, or thrills. RRR. No murmurs, clicks gallops or rub  Abdomen - Abdomen soft, non-tender. BS normal. No masses, organomegaly  Extremities - Extremities normal. No deformities, edema, or skin discoloration.  Musculoskeletal - Spine ROM normal. Muscular strength intact.  Peripheral pulses - radial=4/4, femoral=4/4, popliteal=4/4, dorsalis pedis=4/4,  Neuro - Gait normal. Reflexes " normal and symmetric. Sensation grossly WNL.  Genitalia - Penis normal. No urethral discharge. Scrotum normal to palpation. No hernia.  Rectal - positive findings: prostate 2+      Office Visit on 12/05/2016   Component Date Value Ref Range Status     Hemoglobin 12/05/2016 16.5  13.3 - 17.7 g/dL Final       ASSESSMENT:  No diagnosis found.    Well-Adult Physical Exam.  Health Maintenance Due   Topic Date Due     URINE DRUG SCREEN Q1 YR  09/05/1961     HEPATITIS C SCREENING  09/05/1964     AORTIC ANEURYSM SCREENING (SYSTEM ASSIGNED)  09/05/2011     BMP Q1 YR  10/30/2016     Health Maintenance   Topic Date Due     URINE DRUG SCREEN Q1 YR  09/05/1961     HEPATITIS C SCREENING  09/05/1964     AORTIC ANEURYSM SCREENING (SYSTEM ASSIGNED)  09/05/2011     BMP Q1 YR  10/30/2016     INFLUENZA VACCINE (SYSTEM ASSIGNED)  09/01/2017     FALL RISK ASSESSMENT  08/15/2018     KEDAR QUESTIONNAIRE 1 YEAR  08/15/2018     PHQ-9 Q1YR  08/15/2018     TETANUS IMMUNIZATION (SYSTEM ASSIGNED)  11/13/2018     ADVANCE DIRECTIVE PLANNING Q5 YRS  06/17/2019     LIPID SCREEN Q5 YR MALE (SYSTEM ASSIGNED)  06/20/2019     COLON CANCER SCREEN (SYSTEM ASSIGNED)  03/30/2022     PNEUMOCOCCAL  Completed         HEALTH CARE MAINTENENCE: The recommended screening tests and vaccinatons for this patient have been discussed as above.  The appropriate tests and vaccinations  have been ordered or declined by the patient. Please see the orders in EPIC.The patient specifically declines: n/a     Immunization Status:  up to date and documented     Patient Active Problem List   Diagnosis     Anxiety     Chronic nonallergic rhinitis     CARDIOVASCULAR SCREENING; LDL GOAL LESS THAN 130     GERD (gastroesophageal reflux disease)     Advanced directives, counseling/discussion     Insomnia     Medial epicondylitis     Episodic recurrent vertigo     BPH (benign prostatic hyperplasia)     Seasonal allergic rhinitis     Essential hypertension with goal blood pressure less  than 140/90     Chronic low back pain     Right knee pain        ATP III Guidelines  ICSI Preventive Guidelines    PLAN:   Start atorvastatin for his elevated atherosclerotic cardiovascular disease risk     I recommended to take a daily aspirin (81 to 325 mg)  Discussed calcium intake, vitamins and supplements. Recommended 1000 mg of calcium daily  Sunscreen use was recommended especially in the area of tatoos  Refills on chronic medication given  Recommended dental exams every 6 months  Recommended eye exam every 1-2 years  Follow up in 1 year for the next preventative medical visit

## 2017-08-15 ENCOUNTER — OFFICE VISIT (OUTPATIENT)
Dept: FAMILY MEDICINE | Facility: CLINIC | Age: 72
End: 2017-08-15
Payer: MEDICARE

## 2017-08-15 VITALS
TEMPERATURE: 97.5 F | DIASTOLIC BLOOD PRESSURE: 80 MMHG | OXYGEN SATURATION: 99 % | SYSTOLIC BLOOD PRESSURE: 123 MMHG | WEIGHT: 176 LBS | HEIGHT: 67 IN | HEART RATE: 76 BPM | BODY MASS INDEX: 27.62 KG/M2

## 2017-08-15 DIAGNOSIS — Z12.11 SPECIAL SCREENING FOR MALIGNANT NEOPLASMS, COLON: ICD-10-CM

## 2017-08-15 DIAGNOSIS — R07.0 THROAT PAIN: ICD-10-CM

## 2017-08-15 DIAGNOSIS — Z91.89 10 YEAR RISK OF MI OR STROKE 7.5% OR GREATER: ICD-10-CM

## 2017-08-15 DIAGNOSIS — Z80.0 FAMILY HISTORY OF COLON CANCER: ICD-10-CM

## 2017-08-15 DIAGNOSIS — K21.9 GASTROESOPHAGEAL REFLUX DISEASE WITHOUT ESOPHAGITIS: ICD-10-CM

## 2017-08-15 DIAGNOSIS — R09.81 SINUS CONGESTION: ICD-10-CM

## 2017-08-15 DIAGNOSIS — I10 ESSENTIAL HYPERTENSION WITH GOAL BLOOD PRESSURE LESS THAN 140/90: ICD-10-CM

## 2017-08-15 DIAGNOSIS — Z87.891 PERSONAL HISTORY OF TOBACCO USE, PRESENTING HAZARDS TO HEALTH: Primary | ICD-10-CM

## 2017-08-15 PROCEDURE — G0439 PPPS, SUBSEQ VISIT: HCPCS | Performed by: FAMILY MEDICINE

## 2017-08-15 RX ORDER — AMLODIPINE BESYLATE 5 MG/1
10 TABLET ORAL DAILY
Qty: 180 TABLET | Refills: 3 | Status: SHIPPED | OUTPATIENT
Start: 2017-08-15 | End: 2018-08-31

## 2017-08-15 RX ORDER — SIMVASTATIN 20 MG
20 TABLET ORAL AT BEDTIME
Qty: 90 TABLET | Refills: 3 | Status: CANCELLED | OUTPATIENT
Start: 2017-08-15

## 2017-08-15 RX ORDER — ATORVASTATIN CALCIUM 40 MG/1
40 TABLET, FILM COATED ORAL AT BEDTIME
Qty: 90 TABLET | Refills: 3 | Status: SHIPPED | OUTPATIENT
Start: 2017-08-15 | End: 2018-05-23

## 2017-08-15 RX ORDER — FLUTICASONE PROPIONATE 50 MCG
2 SPRAY, SUSPENSION (ML) NASAL DAILY PRN
Qty: 16 G | Refills: 1 | Status: SHIPPED | OUTPATIENT
Start: 2017-08-15 | End: 2017-12-29

## 2017-08-15 ASSESSMENT — ANXIETY QUESTIONNAIRES
7. FEELING AFRAID AS IF SOMETHING AWFUL MIGHT HAPPEN: NOT AT ALL
2. NOT BEING ABLE TO STOP OR CONTROL WORRYING: SEVERAL DAYS
IF YOU CHECKED OFF ANY PROBLEMS ON THIS QUESTIONNAIRE, HOW DIFFICULT HAVE THESE PROBLEMS MADE IT FOR YOU TO DO YOUR WORK, TAKE CARE OF THINGS AT HOME, OR GET ALONG WITH OTHER PEOPLE: NOT DIFFICULT AT ALL
1. FEELING NERVOUS, ANXIOUS, OR ON EDGE: SEVERAL DAYS
GAD7 TOTAL SCORE: 2
5. BEING SO RESTLESS THAT IT IS HARD TO SIT STILL: NOT AT ALL
6. BECOMING EASILY ANNOYED OR IRRITABLE: NOT AT ALL
3. WORRYING TOO MUCH ABOUT DIFFERENT THINGS: NOT AT ALL

## 2017-08-15 ASSESSMENT — PATIENT HEALTH QUESTIONNAIRE - PHQ9
5. POOR APPETITE OR OVEREATING: NOT AT ALL
SUM OF ALL RESPONSES TO PHQ QUESTIONS 1-9: 1

## 2017-08-15 NOTE — NURSING NOTE
"Chief Complaint   Patient presents with     Medicare Visit       Initial /83  Pulse 76  Temp 97.5  F (36.4  C) (Oral)  Ht 5' 7\" (1.702 m)  Wt 176 lb (79.8 kg)  SpO2 99%  BMI 27.57 kg/m2 Estimated body mass index is 27.57 kg/(m^2) as calculated from the following:    Height as of this encounter: 5' 7\" (1.702 m).    Weight as of this encounter: 176 lb (79.8 kg).  Medication Reconciliation: complete  Shalonda Gonzalez M.A.    "

## 2017-08-15 NOTE — MR AVS SNAPSHOT
After Visit Summary   8/15/2017    Taqueria Bone    MRN: [id number]           Patient Information     Date Of Birth          1945        Visit Information        Provider Department      8/15/2017 11:00 AM Myron Bob MD [facility]        Today's Diagnoses     Personal history of tobacco use, presenting hazards to health    -  1    Throat pain        Gastroesophageal reflux disease without esophagitis        Essential hypertension with goal blood pressure less than 140/90        Sinus congestion        10 year risk of MI or stroke 7.5% or greater, 18% in August 2017        Special screening for malignant neoplasms, colon        Family history of colon cancer          Care Instructions      Preventive Health Recommendations:       Male Ages 65 and over    Yearly exam:             See your health care provider every year in order to  o   Review health changes.   o   Discuss preventive care.    o   Review your medicines if your doctor has prescribed any.    Talk with your health care provider about whether you should have a test to screen for prostate cancer (PSA).    Every 3 years, have a diabetes test (fasting glucose). If you are at risk for diabetes, you should have this test more often.    Every 5 years, have a cholesterol test. Have this test more often if you are at risk for high cholesterol or heart disease.     Every 10 years, have a colonoscopy. Or, have a yearly FIT test (stool test). These exams will check for colon cancer.    Talk to with your health care provider about screening for Abdominal Aortic Aneurysm if you have a family history of AAA or have a history of smoking.  Shots:     Get a flu shot each year.     Get a tetanus shot every 10 years.     Talk to your doctor about your pneumonia vaccines. There are now two you should receive - Pneumovax (PPSV 23) and Prevnar (PCV 13).    Talk to your doctor about a shingles vaccine.     Talk to your doctor about  the hepatitis B vaccine.  Nutrition:     Eat at least 5 servings of fruits and vegetables each day.     Eat whole-grain bread, whole-wheat pasta and brown rice instead of white grains and rice.     Talk to your doctor about Calcium and Vitamin D.   Lifestyle    Exercise for at least 150 minutes a week (30 minutes a day, 5 days a week). This will help you control your weight and prevent disease.     Limit alcohol to one drink per day.     No smoking.     Wear sunscreen to prevent skin cancer.     See your dentist every six months for an exam and cleaning.     See your eye doctor every 1 to 2 years to screen for conditions such as glaucoma, macular degeneration and cataracts.    Please call our [facility] Scheduling Line at [phone] to schedule your:  Ultrasound                     Follow-ups after your visit        Additional Services     GASTROENTEROLOGY ADULT REF PROCEDURE ONLY       Last Lab Result: Creatinine (mg/dL)       Date                     Value                 10/30/2015               0.86             ----------  Body mass index is 27.57 kg/(m^2).      Patient will be contacted to schedule procedure.     Please be aware that coverage of these services is subject to the terms and limitations of your health insurance plan.  Call member services at your health plan with any benefit or coverage questions.  Any procedures must be performed at a [facility] facility OR coordinated by your clinic's referral office.    Please bring the following with you to your appointment:    (1) Any X-Rays, CTs or MRIs which have been performed.  Contact the facility where they were done to arrange for  prior to your scheduled appointment.    (2) List of current medications   (3) This referral request   (4) Any documents/labs given to you for this referral                  Follow-up notes from your care team     Return in about 1 year (around 8/15/2018) for Physical Exam.      Future tests that were ordered for  "you today     Open Future Orders        Priority Expected Expires Ordered    US abdominal aorta limited Routine  8/15/2018 8/15/2017            Who to contact     If you have questions or need follow up information about today's clinic visit or your schedule please contact [facility] directly at [phone].  Normal or non-critical lab and imaging results will be communicated to you by MyChart, letter or phone within 4 business days after the clinic has received the results. If you do not hear from us within 7 days, please contact the clinic through [employer] or phone. If you have a critical or abnormal lab result, we will notify you by phone as soon as possible.  Submit refill requests through [employer] or call your pharmacy and they will forward the refill request to us. Please allow 3 business days for your refill to be completed.          Additional Information About Your Visit        MyChart Information     [employer] gives you secure access to your electronic health record. If you see a primary care provider, you can also send messages to your care team and make appointments. If you have questions, please call your primary care clinic.  If you do not have a primary care provider, please call [phone] and they will assist you.        Care EveryWhere ID     This is your Care EveryWhere ID. This could be used by other organizations to access your [place] medical records  [id number]        Your Vitals Were     Pulse Temperature Height Pulse Oximetry BMI (Body Mass Index)       76 97.5  F (36.4  C) (Oral) 5' 7\" (1.702 m) 99% 27.57 kg/m2        Blood Pressure from Last 3 Encounters:   08/15/17 123/80   01/07/17 138/88   12/16/16 (!) 126/93    Weight from Last 3 Encounters:   08/15/17 176 lb (79.8 kg)   01/07/17 179 lb (81.2 kg)   12/29/16 177 lb (80.3 kg)              We Performed the Following     GASTROENTEROLOGY ADULT REF PROCEDURE ONLY          Today's Medication Changes          These changes are " accurate as of: 8/15/17 12:11 PM.  If you have any questions, ask your nurse or doctor.               Start taking these medicines.        Dose/Directions    atorvastatin 40 MG tablet   Commonly known as:  LIPITOR   Used for:  10 year risk of MI or stroke 7.5% or greater   Started by:  Myron Bob MD        Dose:  40 mg   Take 1 tablet (40 mg) by mouth At Bedtime   Quantity:  90 tablet   Refills:  3         These medicines have changed or have updated prescriptions.        Dose/Directions    ranitidine 150 MG tablet   Commonly known as:  ZANTAC   This may have changed:  See the new instructions.   Used for:  Throat pain, Gastroesophageal reflux disease without esophagitis   Changed by:  Myron Bob MD        Dose:  150 mg   Take 1 tablet (150 mg) by mouth 2 times daily   Quantity:  180 tablet   Refills:  3         Stop taking these medicines if you haven't already. Please contact your care team if you have questions.     omeprazole 20 MG CR capsule   Commonly known as:  priLOSEC   Stopped by:  Myron Bob MD                Where to get your medicines      These medications were sent to [facility] [address] [address]     Phone:  [phone]     amLODIPine 5 MG tablet    atorvastatin 40 MG tablet    fluticasone 50 MCG/ACT spray    ranitidine 150 MG tablet                Primary Care Provider Office Phone # Fax #    Clinic [facility] [phone]       No address on file        Equal Access to Services     ASHLEY BABCOCK AH: Hadii triston Lu, waaxda luqadaha, qaybta kaalmada ademaeyada, joel acosta. So [facility] [phone].    ATENCIÓN: Si habla español, tiene a harris disposición servicios gratuitos de asistencia lingüística. Llame al [phone].    We comply with applicable federal civil rights laws and [place] laws. We do not discriminate on the basis of race, color,  national origin, age, disability sex, sexual orientation or gender identity.            Thank you!     Thank you for choosing [facility]  for your care. Our goal is always to provide you with excellent care. Hearing back from our patients is one way we can continue to improve our services. Please take a few minutes to complete the written survey that you may receive in the mail after your visit with us. Thank you!             Your Updated Medication List - Protect others around you: Learn how to safely use, store and throw away your medicines at www.disposemymeds.org.          This list is accurate as of: 8/15/17 12:11 PM.  Always use your most recent med list.                   Brand Name Dispense Instructions for use Diagnosis    amLODIPine 5 MG tablet    NORVASC    180 tablet    Take 2 tablets (10 mg) by mouth daily    Essential hypertension with goal blood pressure less than 140/90       ARTIFICIAL TEARS Oint     1 Tube    4 x a day as needed and at bedtime.    Keratitis, Dry eyes       aspirin 325 MG EC tablet      Take 325 mg by mouth daily. 1/2 pill a day.        atorvastatin 40 MG tablet    LIPITOR    90 tablet    Take 1 tablet (40 mg) by mouth At Bedtime    10 year risk of MI or stroke 7.5% or greater       cetirizine 10 MG tablet    zyrTEC     Take 5 mg by mouth 2 times daily        FISH OIL      1 capsule.        fluticasone 50 MCG/ACT spray    FLONASE    16 g    Spray 2 sprays into both nostrils daily as needed for rhinitis    Sinus congestion       Glucos-Robert-[id number]-[id number] Tabs      one daily        LORazepam 0.5 MG tablet    ATIVAN    30 tablet    Take 1 tablet (0.5 mg) by mouth daily as needed    Anxiety       meclizine 25 MG tablet    ANTIVERT    30 tablet    Take 1 tablet (25 mg) by mouth 3 times daily as needed    Vertigo       MULTIVITAMIN TABS   OR      1 TABLET DAILY        olopatadine 0.1 % ophthalmic solution    PATANOL    15 mL    Place 1 drop into both eyes 2 times daily.     Allergic conjunctivitis       PROSTATE PO      QD        ranitidine 150 MG tablet    ZANTAC    180 tablet    Take 1 tablet (150 mg) by mouth 2 times daily    Throat pain, Gastroesophageal reflux disease without esophagitis       triamcinolone 0.1 % cream    KENALOG    30 g    Apply sparingly to affected area three times daily for 14 days.    Eczema

## 2017-08-16 ASSESSMENT — ANXIETY QUESTIONNAIRES: GAD7 TOTAL SCORE: 2

## 2017-08-23 ENCOUNTER — RADIANT APPOINTMENT (OUTPATIENT)
Dept: ULTRASOUND IMAGING | Facility: CLINIC | Age: 72
End: 2017-08-23
Attending: FAMILY MEDICINE
Payer: MEDICARE

## 2017-08-23 DIAGNOSIS — Z87.891 PERSONAL HISTORY OF TOBACCO USE, PRESENTING HAZARDS TO HEALTH: ICD-10-CM

## 2017-08-23 PROCEDURE — 76775 US EXAM ABDO BACK WALL LIM: CPT

## 2017-08-23 NOTE — PROGRESS NOTES
Taqueria,  I have reviewed the report of the imaging test or tests that we recently ordered. The results were normal or considered normal for you.  Sincerely,   Myron Bob

## 2017-10-09 ENCOUNTER — TRANSFERRED RECORDS (OUTPATIENT)
Dept: HEALTH INFORMATION MANAGEMENT | Facility: CLINIC | Age: 72
End: 2017-10-09

## 2017-10-13 PROBLEM — D12.6 ADENOMATOUS POLYP OF COLON, UNSPECIFIED PART OF COLON: Status: ACTIVE | Noted: 2017-10-13

## 2017-10-14 ENCOUNTER — HEALTH MAINTENANCE LETTER (OUTPATIENT)
Age: 72
End: 2017-10-14

## 2017-11-09 ENCOUNTER — DOCUMENTATION ONLY (OUTPATIENT)
Dept: LAB | Facility: CLINIC | Age: 72
End: 2017-11-09

## 2017-11-09 DIAGNOSIS — Z13.6 CARDIOVASCULAR SCREENING; LDL GOAL LESS THAN 130: Primary | ICD-10-CM

## 2017-11-09 DIAGNOSIS — I10 ESSENTIAL HYPERTENSION WITH GOAL BLOOD PRESSURE LESS THAN 140/90: ICD-10-CM

## 2017-11-09 NOTE — PROGRESS NOTES
Need previsit labs-see orders and close encounter if nothing else needed./Flavia Martinez,       Cholesterol 11/17/17

## 2017-11-10 DIAGNOSIS — Z13.6 CARDIOVASCULAR SCREENING; LDL GOAL LESS THAN 130: ICD-10-CM

## 2017-11-10 DIAGNOSIS — I10 ESSENTIAL HYPERTENSION WITH GOAL BLOOD PRESSURE LESS THAN 140/90: ICD-10-CM

## 2017-11-10 LAB
ALBUMIN SERPL-MCNC: 3.9 G/DL (ref 3.4–5)
ALP SERPL-CCNC: 89 U/L (ref 40–150)
ALT SERPL W P-5'-P-CCNC: 31 U/L (ref 0–70)
ANION GAP SERPL CALCULATED.3IONS-SCNC: 7 MMOL/L (ref 3–14)
AST SERPL W P-5'-P-CCNC: 20 U/L (ref 0–45)
BILIRUB SERPL-MCNC: 0.7 MG/DL (ref 0.2–1.3)
BUN SERPL-MCNC: 15 MG/DL (ref 7–30)
CALCIUM SERPL-MCNC: 9.8 MG/DL (ref 8.5–10.1)
CHLORIDE SERPL-SCNC: 103 MMOL/L (ref 94–109)
CHOLEST SERPL-MCNC: 196 MG/DL
CO2 SERPL-SCNC: 31 MMOL/L (ref 20–32)
CREAT SERPL-MCNC: 0.92 MG/DL (ref 0.66–1.25)
GFR SERPL CREATININE-BSD FRML MDRD: 81 ML/MIN/1.7M2
GLUCOSE SERPL-MCNC: 96 MG/DL (ref 70–99)
HDLC SERPL-MCNC: 61 MG/DL
LDLC SERPL CALC-MCNC: 116 MG/DL
NONHDLC SERPL-MCNC: 135 MG/DL
POTASSIUM SERPL-SCNC: 4.3 MMOL/L (ref 3.4–5.3)
PROT SERPL-MCNC: 7 G/DL (ref 6.8–8.8)
SODIUM SERPL-SCNC: 141 MMOL/L (ref 133–144)
TRIGL SERPL-MCNC: 97 MG/DL

## 2017-11-10 PROCEDURE — 80053 COMPREHEN METABOLIC PANEL: CPT | Performed by: FAMILY MEDICINE

## 2017-11-10 PROCEDURE — 36415 COLL VENOUS BLD VENIPUNCTURE: CPT | Performed by: FAMILY MEDICINE

## 2017-11-10 PROCEDURE — 80061 LIPID PANEL: CPT | Performed by: FAMILY MEDICINE

## 2017-11-13 NOTE — PROGRESS NOTES
I have reviewed the schedule of future appointments and this patient has an appointment scheduled for 11-.    Visit date not found

## 2017-11-17 ENCOUNTER — OFFICE VISIT (OUTPATIENT)
Dept: FAMILY MEDICINE | Facility: CLINIC | Age: 72
End: 2017-11-17
Payer: MEDICARE

## 2017-11-17 VITALS
WEIGHT: 174 LBS | BODY MASS INDEX: 27.25 KG/M2 | HEART RATE: 101 BPM | DIASTOLIC BLOOD PRESSURE: 75 MMHG | SYSTOLIC BLOOD PRESSURE: 117 MMHG | TEMPERATURE: 99.9 F | OXYGEN SATURATION: 96 %

## 2017-11-17 DIAGNOSIS — R07.0 THROAT PAIN: ICD-10-CM

## 2017-11-17 DIAGNOSIS — Z91.89 10 YEAR RISK OF MI OR STROKE 7.5% OR GREATER: Primary | ICD-10-CM

## 2017-11-17 DIAGNOSIS — K21.9 GASTROESOPHAGEAL REFLUX DISEASE WITHOUT ESOPHAGITIS: ICD-10-CM

## 2017-11-17 PROCEDURE — 99214 OFFICE O/P EST MOD 30 MIN: CPT | Performed by: FAMILY MEDICINE

## 2017-11-17 NOTE — MR AVS SNAPSHOT
After Visit Summary   11/17/2017    Taqueria Bone    MRN: [id number]           Patient Information     Date Of Birth          1945        Visit Information        Provider Department      11/17/2017 11:45 AM Myron Bob [place] [facility] [place]      Nutrition and MyPlate: Oils    Oils are fats that are liquid at room temperature. This food group includes oils you cook with, plus foods that are mostly oil, such as mayonnaise and salad dressing. Oils give the body vitamin E and essential fatty acids, which keep cells and tissues healthy and help the body heal. But oils and other fats are high in calories. Eating too much fat leads to weight gain and increased risk of heart disease.  Fat facts  Some fats are liquid. Others are solid. And all of them can be bad for you if you eat too much. Food labels tell you which fats a food contains. Some are healthier than others:    Unsaturated fats are found in some oils (such as olive, peanut, and canola), nuts, seeds, and fish. These are the healthiest fats. They can be good for your heart in moderate amounts.    Saturated fats are found in animal foods such as butter, lard, beef, and high-fat dairy. These are less healthy, and should be limited.    Trans fats are found in some fast food, such as French fries, snack foods (like chips and cookies), and some margarines and shortenings. These are the worst fats for you. Avoid them when you can.  Be smart about fats    Out with the Bad: Check food labels for trans fats. And stay away from foods that have them. Trans fats are mostly found in processed foods. So, choose unprocessed foods more often.    In with the Good: Choose unsaturated fat over saturated when you can. Here's one idea: Use olive or canola oil instead of lard or butter. What else could you  do?  ___________________________________________________________________     ___________________________________________________________________  Date Last Reviewed: 6/25/2015 2000-2017 [employer]. [address] [place] [address], [place] [address]. All rights reserved. This information is not intended as a substitute for professional medical care. Always follow your healthcare professional's instructions.        High Cholesterol  High cholesterol is also called hypercholesterolemia. Cholesterol and dietary fat are not the same thing. But, it s important to understand how the fat in your diet affects your cholesterol level.  Your body needs cholesterol to build new cells and make certain hormones. There are 2 kinds of cholesterol in your body:    HDL ( good ) cholesterol stops fatty deposits (plaque) from building up in your arteries. In this way it protects you against heart disease and stroke.    LDL ( bad ) cholesterol stays in your body and sticks to artery walls. It may later block blood flow to your heart and brain. This can cause a heart attack (acute myocardial infarction) or stroke.  Your body makes all the cholesterol it needs. But you also get cholesterol from many of the foods you eat. This is why you want to limit how much cholesterol you get in your diet  and how much fat you eat. That s because the cholesterol your body makes from the fat you eat creates the most risk for disease. The type of fat you eat has the biggest influence on how much cholesterol your body makes.   Fats come in 2 kinds:    Good fats are the unsaturated fats. These are also called monounsaturated and polyunsaturated fats. They raise the level of good cholesterol and lower the level of bad cholesterol. Good fats are found in vegetable oils like olive, sunflower, corn, and soybean oils, and in nuts and seeds.    Bad fats are saturated fats and trans fats. These raise the risk for disease. They lower the good cholesterol  and raise the level of bad cholesterol. Bad fats are found in all red meat and whole-milk dairy products. Some plants also have a lot of saturated fats such as coconut and palm plants. Trans fats are found in stick margarines and many fast foods and commercially baked goods. Soft margarine sold in tubs has less trans fat and is safer to use. Trans fat in particular raise bad cholesterol and lowers good cholesterol.  You can have high blood cholesterol if you eat a diet high in saturated fat and don t get much exercise. In some cases, your family history plays a role. Your health care provider can diagnose high cholesterol with blood tests. Treatment consists of a diet low in saturated fat, weight loss, and exercise. If these efforts don t lower your cholesterol, your provider may prescribe medicines. They must be taken daily to keep your cholesterol levels low. Being overweight also raises the risk for high cholesterol and heart disease. Losing even a small amount of weight can help lower your risk.  High risk groups  Certain groups of people should talk to their healthcare provider about using cholesterol-lowering statin medicines for controlling their cholesterol to stay healthy or to prevent future heart attacks or stroke. It may be beneficial to take a medicine in addition to eating a healthy diet and exercising regularly for these groups. The major groups include:    Adults who have had a heart attack or stroke or some other atherosclerotic disease (such as peripheral vascular disease), a transient ischemic attack, stable or unstable angina, and anyone who has had a procedure to restore blood flow through a blocked artery such as percutaneous coronary intervention, angioplasty, stent, open-heart bypass surgery.    Adults who have diabetes or an elevated calculated risk of having a heart attack or stroke (7.5%) and an elevated level of LDL cholesterol  mg/dL.    People who are 21 years of age and older who  have an elevated LDL cholesterol level of 190 mg/dL or higher  Home care  Follow these guidelines when caring for yourself at home:    Talk with your health care provider before starting a low-cholesterol diet or weight-loss program.    In general, a low-cholesterol diet means that you eat less saturated fat (red meat and regular dairy) and less cholesterol each day. You may eat foods with unsaturated fats (vegetable oils, nuts, and seeds). Eat more fruits, vegetables, fish, and whole grains, or other high-fiber foods.    Learn to read food labels so you know what you are eating.    A registered dietitian can teach you how to plan meals and change your diet. You can ask your provider for a referral.    Aim for 40 minutes of moderate to vigorous physical activity 3 to 4 times a week. Pick activities you enjoy. Walking is a good choice if you want to lose weight. If you have diabetes, hypertension, or heart disease, talk with your provider to see what activities he or she recommends.    If your provider has prescribed medicines, take them as directed.    If you smoke, talk with your provider about how to quit smoking. Smoking lowers good cholesterol levels and can increase damage done by bad cholesterol.    Limit how much alcohol you drink.    If you have diabetes, talk with your provider and a dietitian about other food and lifestyle changes you can make to lower your risk for heart disease and stroke.  Follow-up care  Follow up with your healthcare provider, or as advised. It takes at least 3 months for dietary changes to show a result in your blood cholesterol. Have repeat blood testing as advised by your provider.  If an X-ray or ECG (electrocardiogram) was done, a specialist will look at it. You will be told of any new findings that may affect your care.  When to seek medical advice  Call your healthcare provider right away if any of these occur:    Chest, arm, shoulder, neck, or upper back pain    Shortness of  breath    Weakness or numbness of an arm, leg, or one side of the face    Trouble with speech or vision    Weakness, dizziness, or fainting  Talk to your healthcare provider about your treatment goals. Make sure you understand how cholesterol impacts you based on your personal health history and family history of heart disease or high cholesterol. Plan to have regular monitoring and follow up on any side effects that you may develop to the cholesterol-lowering medicines. Be aware that sometimes you may need more than one medicine to reach your cholesterol goals. Also make sure you understand how to prepare for your cholesterol testing which may or may not require fasting.  Date Last Reviewed: 1/1/2017 2000-2017 [employer]. [address] [place] [address], [place] [address]. All rights reserved. This information is not intended as a substitute for professional medical care. Always follow your healthcare professional's instructions.    Go back to the ranitidine 150 mg twice a day(s) and let me know if that is not working to control your acid reflux and cough and we could consider going back on omeprazole and monitoring your kidney function                Follow-ups after your visit        Follow-up notes from your care team     Return in about 6 months (around 5/17/2018) for Physical Exam.      Who to contact     If you have questions or need follow up information about today's clinic visit or your schedule please contact [facility] directly at [phone].  Normal or non-critical lab and imaging results will be communicated to you by MyChart, letter or phone within 4 business days after the clinic has received the results. If you do not hear from us within 7 days, please contact the clinic through MyChart or phone. If you have a critical or abnormal lab result, we will notify you by phone as soon as possible.  Submit refill requests through [employer] or call your pharmacy and they will forward  the refill request to us. Please allow 3 business days for your refill to be completed.          Additional Information About Your Visit        MyChart Information     [facility] gives you secure access to your electronic health record. If you see a primary care provider, you can also send messages to your care team and make appointments. If you have questions, please call your primary care clinic.  If you do not have a primary care provider, please call [phone] and they will assist you.        Care EveryWhere ID     This is your Care EveryWhere ID. This could be used by other organizations to access your [facility] medical records  [id number]        Your Vitals Were     Pulse Temperature Pulse Oximetry BMI (Body Mass Index)          101 99.9  F (37.7  C) (Oral) 96% 27.25 kg/m2         Blood Pressure from Last 3 Encounters:   11/17/17 117/75   08/15/17 123/80   01/07/17 138/88    Weight from Last 3 Encounters:   11/17/17 174 lb (78.9 kg)   08/15/17 176 lb (79.8 kg)   01/07/17 179 lb (81.2 kg)              Today, you had the following     No orders found for display       Primary Care Provider Office Phone # Fax #    Myron Bob [phone] [id number] [facility] [id number]        Equal Access to Services     ASHLEY BABCOCK : Hadii triston ku hadasho Soomaali, waaxda luqadaha, qaybta kaalmada adeegyada, joel graf haywilliam pineda . So [facility] [phone].    ATENCIÓN: Si habla español, tiene a harris disposición servicios gratuitos de asistencia lingüística. Llame al [phone].    We comply with applicable federal civil rights laws and [place] laws. We do not discriminate on the basis of race, color, national origin, age, disability, sex, sexual orientation, or gender identity.            Thank you!     Thank you for choosing [facility]  for your care. Our goal is always to provide you with excellent care. Hearing back from our patients is one way we can  continue to improve our services. Please take a few minutes to complete the written survey that you may receive in the mail after your visit with us. Thank you!             Your Updated Medication List - Protect others around you: Learn how to safely use, store and throw away your medicines at www.disposemymeds.org.          This list is accurate as of: 11/17/17 12:36 PM.  Always use your most recent med list.                   Brand Name Dispense Instructions for use Diagnosis    amLODIPine 5 MG tablet    NORVASC    180 tablet    Take 2 tablets (10 mg) by mouth daily    Essential hypertension with goal blood pressure less than 140/90       ARTIFICIAL TEARS Oint     1 Tube    4 x a day as needed and at bedtime.    Keratitis, Dry eyes       aspirin 325 MG EC tablet      Take 325 mg by mouth daily. 1/2 pill a day.        atorvastatin 40 MG tablet    LIPITOR    90 tablet    Take 1 tablet (40 mg) by mouth At Bedtime    10 year risk of MI or stroke 7.5% or greater       cetirizine 10 MG tablet    zyrTEC     Take 5 mg by mouth 2 times daily Taking 1/2 tablet once daily        FISH OIL      1 capsule.        fluticasone 50 MCG/ACT spray    FLONASE    16 g    Spray 2 sprays into both nostrils daily as needed for rhinitis    Sinus congestion       Glucos-Robert-MSM-Wc-P-PkEf-SeCu Tabs      one daily        LORazepam 0.5 MG tablet    ATIVAN    30 tablet    Take 1 tablet (0.5 mg) by mouth daily as needed    Anxiety       meclizine 25 MG tablet    ANTIVERT    30 tablet    Take 1 tablet (25 mg) by mouth 3 times daily as needed    Vertigo       MULTIVITAMIN TABS   OR      1 TABLET DAILY        olopatadine 0.1 % ophthalmic solution    PATANOL    15 mL    Place 1 drop into both eyes 2 times daily.    Allergic conjunctivitis       PROSTATE PO      QD        ranitidine 150 MG tablet    ZANTAC    180 tablet    Take 1 tablet (150 mg) by mouth 2 times daily    Throat pain, Gastroesophageal reflux disease without esophagitis        triamcinolone 0.1 % cream    KENALOG    30 g    Apply sparingly to affected area three times daily for 14 days.    Eczema

## 2017-11-17 NOTE — PROGRESS NOTES
SUBJECTIVE:    Taqueria Bone is a 72 year old male who  presents for a recheck of dyslipidemia.  He did not take the atorvastatin because after he filled it he read the side effects.   He has been eating less fatty food and he has eliminated coconut oil from his diet    The 10-year ASCVD risk score (Jack ESTRADA Jr, et al., 2013) is: 17.6%    Values used to calculate the score:      Age: 72 years      Sex: Male      Is Non- : No      Diabetic: No      Tobacco smoker: No      Systolic Blood Pressure: 117 mmHg      Is BP treated: Yes      HDL Cholesterol: 61 mg/dL      Total Cholesterol: 196 mg/dL    Patient Active Problem List    Diagnosis     Anxiety     Chronic nonallergic rhinitis     CARDIOVASCULAR SCREENING; LDL GOAL LESS THAN 130     GERD (gastroesophageal reflux disease)     Advanced directives, counseling/discussion     Insomnia     Medial epicondylitis     Episodic recurrent vertigo     BPH (benign prostatic hyperplasia)     Seasonal allergic rhinitis     Essential hypertension with goal blood pressure less than 140/90     Chronic low back pain     Right knee pain     Personal history of tobacco use, presenting hazards to health     10 year risk of MI or stroke 7.5% or greater, 18% in August 2017     Adenomatous polyp of colon, unspecified part of colon       Family History   Problem Relation Age of Onset     CANCER Father      colon, spread to liver     DIABETES Father      DIABETES Mother      Hypertension Mother      Thyroid Disease Mother      Glaucoma Mother      HEART DISEASE Mother 91     MI      CEREBROVASCULAR DISEASE Maternal Grandfather      DIABETES Paternal Grandfather      Hypertension Brother        Social History   Substance Use Topics     Smoking status: Former Smoker     Quit date: 10/31/1979     Smokeless tobacco: Former User      Comment: 1979     Alcohol use 0.5 oz/week     1 Cans of beer per week      Comment: 1-2 beer daily       Current Outpatient  Prescriptions   Medication Sig Dispense Refill     ranitidine (ZANTAC) 150 MG tablet Take 1 tablet (150 mg) by mouth 2 times daily 180 tablet 3     amLODIPine (NORVASC) 5 MG tablet Take 2 tablets (10 mg) by mouth daily 180 tablet 3     fluticasone (FLONASE) 50 MCG/ACT spray Spray 2 sprays into both nostrils daily as needed for rhinitis 16 g 1     Artificial Tear Ointment (ARTIFICIAL TEARS) OINT 4 x a day as needed and at bedtime. 1 Tube 0     cetirizine (ZYRTEC) 10 MG tablet Take 5 mg by mouth 2 times daily Taking 1/2 tablet once daily       LORazepam (ATIVAN) 0.5 MG tablet Take 1 tablet (0.5 mg) by mouth daily as needed 30 tablet 0     triamcinolone (KENALOG) 0.1 % cream Apply sparingly to affected area three times daily for 14 days. 30 g 1     meclizine (ANTIVERT) 25 MG tablet Take 1 tablet (25 mg) by mouth 3 times daily as needed 30 tablet 1     olopatadine (PATANOL) 0.1 % ophthalmic solution Place 1 drop into both eyes 2 times daily. 15 mL 1     FISH OIL 1 capsule.       GLUCOS-AMANDA-MSM-UN-R-YGZQ-SECU PO TABS one daily       PROSTATE PO QD       aspirin 325 MG EC tablet Take 325 mg by mouth daily. 1/2 pill a day.        MULTIVITAMIN TABS   OR 1 TABLET DAILY       atorvastatin (LIPITOR) 40 MG tablet Take 1 tablet (40 mg) by mouth At Bedtime (Patient not taking: Reported on 11/17/2017) 90 tablet 3         The patient reports that he does exercise frequently still.      The patient is currently following a low fat diet plan.     Review of lipid profiles:    Lab Results   Component Value Date    CHOL 196 11/10/2017    CHOL 206 05/19/2017    CHOL 168 06/20/2014    CHOL 193 01/09/2012    CHOL 175 12/15/2010    CHOL 190 12/08/2009    CHOL 164@ 11/13/2008     Lab Results   Component Value Date     11/10/2017     06/20/2014     01/09/2012     12/15/2010     12/08/2009    LDL 97@ 11/13/2008     Lab Results   Component Value Date    HDL 61 11/10/2017    HDL 52 06/20/2014    HDL 45  01/09/2012    HDL 52 12/15/2010    HDL 54 12/08/2009    HDL 57@ 11/13/2008     Lab Results   Component Value Date    TRIG 97 11/10/2017    TRIG 140 05/19/2017    TRIG 58 06/20/2014    TRIG 69 01/09/2012    TRIG 68 12/15/2010    TRIG 76 12/08/2009    TRIG 51@ 11/13/2008       Thyroid study review:  Lab Results   Component Value Date    TSH 2.08 05/19/2017    TSH 2.04 01/09/2012    TSH 1.73 12/08/2009       OBJECTIVE:  No apparent distress  Blood pressure 117/75, pulse 101, temperature 99.9  F (37.7  C), temperature source Oral, weight 174 lb (78.9 kg), SpO2 96 %.  Abdomen: soft, positive for bowel sounds, contender, no  hepatosplenomegaly.    ASSESSMENT:   Dyslipidemia  The 10-year ASCVD risk score (Jack ESTRADA Jr, et al., 2013) is: 17.6%    Values used to calculate the score:      Age: 72 years      Sex: Male      Is Non- : No      Diabetic: No      Tobacco smoker: No      Systolic Blood Pressure: 117 mmHg      Is BP treated: Yes      HDL Cholesterol: 61 mg/dL      Total Cholesterol: 196 mg/dL      PLAN:   With a 17.6  % 10 year risk of having MI/Stroke by ACC/AHA risk calculator, the patient's LDL is too high.  his HDL is normal.  his triglycerides are normal.    We have discussed the results and that the recommendations would be for him to start a statin. He would like to continue(s) to work on his diet and exercise.     He weill follow-up in 6 months for a fasting lipid profile in the lab. I want the patient to follow up with me in the clinic 1 week after the labs were drawn.         --------------------------------------------------------------------------------------------------------------------------------------    SUBJECTIVE:  HPI: Taqueria Bone is a 72 year old male who presents for follow up on symptoms of acid reflux and cough.   He was worried about the renal effects of omeprazole so at the last appointment(s) we discontinued it and started him on ranitidine 150 mf bid.   He  reports that he has been taking the ranitidine 150 bid but his cough got worse and his stomach got worse.   He has been taking 300 mg in the evening and 150 in the am and things are good on this dose.      Patient Active Problem List 11/17/2017 - Donell as Reviewed 11/17/2017   -- AZITHROMYCIN -- GI Disturbance -- noted 12/08/2009   -- LACTOSE --  -- noted 01/18/2012   -- LEVAQUIN --  -- noted 12/08/2009   -- PENICILLINS -- Hives -- noted 12/08/2009   -- XYLOCAINE [LIDOCAINE HCL] --  -- noted 02/11/2010      Current Outpatient Prescriptions:      ranitidine (ZANTAC) 150 MG tablet, Take 1 tablet (150 mg) by mouth 2 times daily, Disp: 180 tablet, Rfl: 3     amLODIPine (NORVASC) 5 MG tablet, Take 2 tablets (10 mg) by mouth daily, Disp: 180 tablet, Rfl: 3     fluticasone (FLONASE) 50 MCG/ACT spray, Spray 2 sprays into both nostrils daily as needed for rhinitis, Disp: 16 g, Rfl: 1     Artificial Tear Ointment (ARTIFICIAL TEARS) OINT, 4 x a day as needed and at bedtime., Disp: 1 Tube, Rfl: 0     cetirizine (ZYRTEC) 10 MG tablet, Take 5 mg by mouth 2 times daily Taking 1/2 tablet once daily, Disp: , Rfl:      LORazepam (ATIVAN) 0.5 MG tablet, Take 1 tablet (0.5 mg) by mouth daily as needed, Disp: 30 tablet, Rfl: 0     triamcinolone (KENALOG) 0.1 % cream, Apply sparingly to affected area three times daily for 14 days., Disp: 30 g, Rfl: 1     meclizine (ANTIVERT) 25 MG tablet, Take 1 tablet (25 mg) by mouth 3 times daily as needed, Disp: 30 tablet, Rfl: 1     olopatadine (PATANOL) 0.1 % ophthalmic solution, Place 1 drop into both eyes 2 times daily., Disp: 15 mL, Rfl: 1     FISH OIL, 1 capsule., Disp: , Rfl:      GLUCOS-AMANDA-MSM-TN-W-MXEG-SECU PO TABS, one daily, Disp: , Rfl:      PROSTATE PO, QD, Disp: , Rfl:      aspirin 325 MG EC tablet, Take 325 mg by mouth daily. 1/2 pill a day. , Disp: , Rfl:      MULTIVITAMIN TABS   OR, 1 TABLET DAILY, Disp: , Rfl:      atorvastatin (LIPITOR) 40 MG tablet, Take 1 tablet (40 mg) by mouth  At Bedtime (Patient not taking: Reported on 11/17/2017), Disp: 90 tablet, Rfl: 3  Patient Active Problem List   Diagnosis     Anxiety     Chronic nonallergic rhinitis     CARDIOVASCULAR SCREENING; LDL GOAL LESS THAN 130     GERD (gastroesophageal reflux disease)     Advanced directives, counseling/discussion     Insomnia     Medial epicondylitis     Episodic recurrent vertigo     BPH (benign prostatic hyperplasia)     Seasonal allergic rhinitis     Essential hypertension with goal blood pressure less than 140/90     Chronic low back pain     Right knee pain     Personal history of tobacco use, presenting hazards to health     10 year risk of MI or stroke 7.5% or greater, 18% in August 2017     Adenomatous polyp of colon, unspecified part of colon         PHYSICAL EXAM:  /75  Pulse 101  Temp 99.9  F (37.7  C) (Oral)  Wt 174 lb (78.9 kg)  SpO2 96%  BMI 27.25 kg/m2   General: healthy, alert and no distress      ASSESSMENT / IMPRESSION:  1)Gastroesophageal Reflux Disease      PLAN:    Go back to the ranitidine 150 mg twice a day(s) and let me know if that is not working to control your acid reflux and cough and we could consider going back on omeprazole and monitoring your kidney function

## 2017-11-17 NOTE — PATIENT INSTRUCTIONS
Nutrition and MyPlate: Oils    Oils are fats that are liquid at room temperature. This food group includes oils you cook with, plus foods that are mostly oil, such as mayonnaise and salad dressing. Oils give the body vitamin E and essential fatty acids, which keep cells and tissues healthy and help the body heal. But oils and other fats are high in calories. Eating too much fat leads to weight gain and increased risk of heart disease.  Fat facts  Some fats are liquid. Others are solid. And all of them can be bad for you if you eat too much. Food labels tell you which fats a food contains. Some are healthier than others:    Unsaturated fats are found in some oils (such as olive, peanut, and canola), nuts, seeds, and fish. These are the healthiest fats. They can be good for your heart in moderate amounts.    Saturated fats are found in animal foods such as butter, lard, beef, and high-fat dairy. These are less healthy, and should be limited.    Trans fats are found in some fast food, such as French fries, snack foods (like chips and cookies), and some margarines and shortenings. These are the worst fats for you. Avoid them when you can.  Be smart about fats    Out with the Bad: Check food labels for trans fats. And stay away from foods that have them. Trans fats are mostly found in processed foods. So, choose unprocessed foods more often.    In with the Good: Choose unsaturated fat over saturated when you can. Here's one idea: Use olive or canola oil instead of lard or butter. What else could you do?  ___________________________________________________________________     ___________________________________________________________________  Date Last Reviewed: 6/25/2015 2000-2017 [employer]. [address] [place] [address], [place] [address]. All rights reserved. This information is not intended as a substitute for professional medical care. Always follow your healthcare professional's  instructions.        High Cholesterol  High cholesterol is also called hypercholesterolemia. Cholesterol and dietary fat are not the same thing. But, it s important to understand how the fat in your diet affects your cholesterol level.  Your body needs cholesterol to build new cells and make certain hormones. There are 2 kinds of cholesterol in your body:    HDL ( good ) cholesterol stops fatty deposits (plaque) from building up in your arteries. In this way it protects you against heart disease and stroke.    LDL ( bad ) cholesterol stays in your body and sticks to artery walls. It may later block blood flow to your heart and brain. This can cause a heart attack (acute myocardial infarction) or stroke.  Your body makes all the cholesterol it needs. But you also get cholesterol from many of the foods you eat. This is why you want to limit how much cholesterol you get in your diet  and how much fat you eat. That s because the cholesterol your body makes from the fat you eat creates the most risk for disease. The type of fat you eat has the biggest influence on how much cholesterol your body makes.   Fats come in 2 kinds:    Good fats are the unsaturated fats. These are also called monounsaturated and polyunsaturated fats. They raise the level of good cholesterol and lower the level of bad cholesterol. Good fats are found in vegetable oils like olive, sunflower, corn, and soybean oils, and in nuts and seeds.    Bad fats are saturated fats and trans fats. These raise the risk for disease. They lower the good cholesterol and raise the level of bad cholesterol. Bad fats are found in all red meat and whole-milk dairy products. Some plants also have a lot of saturated fats such as coconut and palm plants. Trans fats are found in stick margarines and many fast foods and commercially baked goods. Soft margarine sold in tubs has less trans fat and is safer to use. Trans fat in particular raise bad cholesterol and lowers good  cholesterol.  You can have high blood cholesterol if you eat a diet high in saturated fat and don t get much exercise. In some cases, your family history plays a role. Your health care provider can diagnose high cholesterol with blood tests. Treatment consists of a diet low in saturated fat, weight loss, and exercise. If these efforts don t lower your cholesterol, your provider may prescribe medicines. They must be taken daily to keep your cholesterol levels low. Being overweight also raises the risk for high cholesterol and heart disease. Losing even a small amount of weight can help lower your risk.  High risk groups  Certain groups of people should talk to their healthcare provider about using cholesterol-lowering statin medicines for controlling their cholesterol to stay healthy or to prevent future heart attacks or stroke. It may be beneficial to take a medicine in addition to eating a healthy diet and exercising regularly for these groups. The major groups include:    Adults who have had a heart attack or stroke or some other atherosclerotic disease (such as peripheral vascular disease), a transient ischemic attack, stable or unstable angina, and anyone who has had a procedure to restore blood flow through a blocked artery such as percutaneous coronary intervention, angioplasty, stent, open-heart bypass surgery.    Adults who have diabetes or an elevated calculated risk of having a heart attack or stroke (7.5%) and an elevated level of LDL cholesterol  mg/dL.    People who are 21 years of age and older who have an elevated LDL cholesterol level of 190 mg/dL or higher  Home care  Follow these guidelines when caring for yourself at home:    Talk with your health care provider before starting a low-cholesterol diet or weight-loss program.    In general, a low-cholesterol diet means that you eat less saturated fat (red meat and regular dairy) and less cholesterol each day. You may eat foods with unsaturated  fats (vegetable oils, nuts, and seeds). Eat more fruits, vegetables, fish, and whole grains, or other high-fiber foods.    Learn to read food labels so you know what you are eating.    A registered dietitian can teach you how to plan meals and change your diet. You can ask your provider for a referral.    Aim for 40 minutes of moderate to vigorous physical activity 3 to 4 times a week. Pick activities you enjoy. Walking is a good choice if you want to lose weight. If you have diabetes, hypertension, or heart disease, talk with your provider to see what activities he or she recommends.    If your provider has prescribed medicines, take them as directed.    If you smoke, talk with your provider about how to quit smoking. Smoking lowers good cholesterol levels and can increase damage done by bad cholesterol.    Limit how much alcohol you drink.    If you have diabetes, talk with your provider and a dietitian about other food and lifestyle changes you can make to lower your risk for heart disease and stroke.  Follow-up care  Follow up with your healthcare provider, or as advised. It takes at least 3 months for dietary changes to show a result in your blood cholesterol. Have repeat blood testing as advised by your provider.  If an X-ray or ECG (electrocardiogram) was done, a specialist will look at it. You will be told of any new findings that may affect your care.  When to seek medical advice  Call your healthcare provider right away if any of these occur:    Chest, arm, shoulder, neck, or upper back pain    Shortness of breath    Weakness or numbness of an arm, leg, or one side of the face    Trouble with speech or vision    Weakness, dizziness, or fainting  Talk to your healthcare provider about your treatment goals. Make sure you understand how cholesterol impacts you based on your personal health history and family history of heart disease or high cholesterol. Plan to have regular monitoring and follow up on any  side effects that you may develop to the cholesterol-lowering medicines. Be aware that sometimes you may need more than one medicine to reach your cholesterol goals. Also make sure you understand how to prepare for your cholesterol testing which may or may not require fasting.  Date Last Reviewed: 1/1/2017 2000-2017 The [employer]. [address] [place] [address], [place] [address]. All rights reserved. This information is not intended as a substitute for professional medical care. Always follow your healthcare professional's instructions.    Go back to the ranitidine 150 mg twice a day(s) and let me know if that is not working to control your acid reflux and cough and we could consider going back on omeprazole and monitoring your kidney function

## 2017-11-17 NOTE — NURSING NOTE
"Chief Complaint   Patient presents with     Lipids       Initial /87  Pulse 101  Temp 99.9  F (37.7  C) (Oral)  Wt 174 lb (78.9 kg)  SpO2 96%  BMI 27.25 kg/m2 Estimated body mass index is 27.25 kg/(m^2) as calculated from the following:    Height as of 8/15/17: 5' 7\" (1.702 m).    Weight as of this encounter: 174 lb (78.9 kg).  Medication Reconciliation: complete     Gianna Alejandro cma    "

## 2017-12-29 DIAGNOSIS — R09.81 SINUS CONGESTION: ICD-10-CM

## 2017-12-29 NOTE — TELEPHONE ENCOUNTER
Routing refill request to provider for review/approval because:  Diagnosis for med is nasal congestion.     Routing to provider to advise.  Yuly Prieto RN

## 2018-01-01 RX ORDER — FLUTICASONE PROPIONATE 50 MCG
2 SPRAY, SUSPENSION (ML) NASAL DAILY PRN
Qty: 16 G | Refills: 1 | Status: SHIPPED | OUTPATIENT
Start: 2018-01-01 | End: 2018-05-23

## 2018-02-10 ENCOUNTER — TELEPHONE (OUTPATIENT)
Dept: FAMILY MEDICINE | Facility: CLINIC | Age: 73
End: 2018-02-10

## 2018-02-10 ENCOUNTER — NURSE TRIAGE (OUTPATIENT)
Dept: NURSING | Facility: CLINIC | Age: 73
End: 2018-02-10

## 2018-02-10 DIAGNOSIS — H10.31 ACUTE CONJUNCTIVITIS OF RIGHT EYE: Primary | ICD-10-CM

## 2018-02-10 RX ORDER — POLYMYXIN B SULFATE AND TRIMETHOPRIM 1; 10000 MG/ML; [USP'U]/ML
1 SOLUTION OPHTHALMIC EVERY 4 HOURS
Qty: 3 ML | Refills: 0 | Status: SHIPPED | OUTPATIENT
Start: 2018-02-10 | End: 2018-02-17

## 2018-02-10 NOTE — TELEPHONE ENCOUNTER
"  Reason for Disposition    [1] Eye with yellow/green discharge or eyelashes stick together AND [2] NO PCP standing order to call in antibiotic eye drops    Additional Information    Negative: Chemical got in the eye    Negative: Piece of something got in the eye    Negative: Eye redness followed an eye injury    Negative: Yellow or green pus in the eyes    Negative: [1] Eyelid is swollen AND [2] no redness of white of eye (sclera)    Negative: Caused by pollen or other allergy OR the main symptom is itchy eyes    Negative: Red, widespread rash also present    Negative: Severe eye pain    Negative: [1] Eyelids are very swollen (shut or almost) AND [2] fever    Negative: [1] Eyelid (outer) is very red (or tender to touch) AND [2] fever    Negative: [1] Foreign body sensation (\"feels like something is in there\") AND [2] irrigation didn't help    Negative: Vomiting    Negative: [1] Recent eye surgery AND [2] increasing eye pain    Negative: Patient sounds very sick or weak to the triager    Negative: Blurred vision    Negative: [1] Eye pain AND [2] more than mild    Negative: Cloudy spot or sore seen on the cornea (clear part of the eye)    Negative: Eye exposure to chemical or fumes    Negative: Redness of white of eye (sclera), but no pus or only a small amount of brief pus    Negative: SEVERE eye pain (e.g., excruciating)    Negative: [1] Eyelids are very swollen (shut or almost) AND [2] fever    Negative: [1] Eyelid (outer) is very red (or tender to touch) AND [2] fever    Negative: Patient sounds very sick or weak to the triager    Negative: MODERATE eye pain (e.g., interferes with normal activities)    Negative: Fever > 104 F (40 C)    Negative: Cloudy spot or sore seen on the cornea (clear part of the eye)    Negative: Blurred vision    Negative: Eye is very swollen (shut or almost)    Negative: [1] MILD eye pain or discomfort AND [2] wears contacts    Negative: Eyelid is red and painful (or tender to touch)    " Negative: Discharge from penis    Negative: New or abnormal vaginal discharge    Negative: Fever present > 3 days (72 hours)    Negative: [1] Lots of yellow or green nasal discharge AND [2] present now AND [3] fever    Negative: Weak immune system (e.g., HIV positive, cancer chemo, splenectomy, organ transplant, chronic steroids)    Protocols used: EYE - PUS OR DISCHARGE-ADULT-AH, EYE - RED WITHOUT PUS-ADULT-AH

## 2018-02-10 NOTE — TELEPHONE ENCOUNTER
"Patient calling reporting \"sinus symptoms\" x 4 days. \"It has gone to my eye.\" Reports right eye was crusted shut this morning. Sclera reddened, mild swelling around eye. \"Feels dry.\" Afebrile. Patient is requesting eye drops.  Paged Dr Flores through [employer] at 1123 a.m. To call Mahsa at .  Dr Flores returned page advised ok to start Polytrim Eye drops 1 drop to right eye every 4 hours x 7 days. 1 bottle no refills.  Diagnosis conjunctivitis.  Patient notified and verbalized understanding. Denies further questions.     Mahsa Fortune RN  [facility]      "

## 2018-02-10 NOTE — TELEPHONE ENCOUNTER
"Patient calling reporting \"sinus symptoms\" x 4 days. \"It has gone to my eye.\" Reports right eye was crusted shut this morning. Sclera reddened, mild swelling around eye. \"Feels dry.\" Afebrile. Patient is requesting eye drops.  Paged Dr Flores through [employer] at 1123 a.m. To call Mahsa at .  Dr Flores returned page advised ok to start Polytrim Eye drops 1 drop to right eye every 4 hours x 7 days. 1 bottle no refills.  Diagnosis conjunctivitis.  Patient notified and verbalized understanding. Denies further questions.     Mahsa Fortune RN  [facility]          "

## 2018-05-18 ENCOUNTER — TELEPHONE (OUTPATIENT)
Dept: FAMILY MEDICINE | Facility: CLINIC | Age: 73
End: 2018-05-18

## 2018-05-18 NOTE — TELEPHONE ENCOUNTER
I have discussed this patient's issue with the RN involved and we have come up with this plan.  Myron Bob MD

## 2018-05-18 NOTE — TELEPHONE ENCOUNTER
Patient calling, he will be going to [place] next month and is wondering if he can have a prescription for antibiotics called to pharmacy to have on hand in case he needs it.

## 2018-05-18 NOTE — TELEPHONE ENCOUNTER
Information below is reviewed with  Dr Bob, Verbal Orders, traditionally will not prescribe medication for travel, only for certain parts of the world, for travelers diarrhea, however [place] is not a place we prescribe medication.   Discussed can try [facility] website or schedule an appointment to discuss travel plans.   Verbalized good understanding.     Per protocol, will route encounter to be cosigned by provider for Verbal Orders.  Dianne Sawant RN

## 2018-05-23 ENCOUNTER — OFFICE VISIT (OUTPATIENT)
Dept: FAMILY MEDICINE | Facility: CLINIC | Age: 73
End: 2018-05-23
Payer: MEDICARE

## 2018-05-23 VITALS
BODY MASS INDEX: 27.78 KG/M2 | WEIGHT: 177 LBS | RESPIRATION RATE: 20 BRPM | DIASTOLIC BLOOD PRESSURE: 91 MMHG | SYSTOLIC BLOOD PRESSURE: 144 MMHG | OXYGEN SATURATION: 97 % | HEART RATE: 90 BPM | TEMPERATURE: 98.3 F | HEIGHT: 67 IN

## 2018-05-23 DIAGNOSIS — F41.9 ANXIETY: ICD-10-CM

## 2018-05-23 DIAGNOSIS — J01.90 ACUTE SINUSITIS WITH SYMPTOMS > 10 DAYS: Primary | ICD-10-CM

## 2018-05-23 DIAGNOSIS — R09.81 SINUS CONGESTION: ICD-10-CM

## 2018-05-23 PROCEDURE — 99213 OFFICE O/P EST LOW 20 MIN: CPT | Performed by: FAMILY MEDICINE

## 2018-05-23 RX ORDER — CEFDINIR 300 MG/1
300 CAPSULE ORAL 2 TIMES DAILY
Qty: 20 CAPSULE | Refills: 0 | Status: SHIPPED | OUTPATIENT
Start: 2018-05-23 | End: 2018-07-17

## 2018-05-23 RX ORDER — FLUTICASONE PROPIONATE 50 MCG
1 SPRAY, SUSPENSION (ML) NASAL 2 TIMES DAILY
Qty: 3 BOTTLE | Refills: 3 | Status: SHIPPED | OUTPATIENT
Start: 2018-05-23 | End: 2019-04-30

## 2018-05-23 RX ORDER — LORAZEPAM 0.5 MG/1
0.5 TABLET ORAL DAILY PRN
Qty: 30 TABLET | Refills: 0 | Status: SHIPPED | OUTPATIENT
Start: 2018-05-23 | End: 2019-07-15

## 2018-05-23 ASSESSMENT — PAIN SCALES - GENERAL: PAINLEVEL: NO PAIN (0)

## 2018-05-23 NOTE — MR AVS SNAPSHOT
After Visit Summary   5/23/2018    Taqueria Bone    MRN: [id number]           Patient Information     Date Of Birth          1945        Visit Information        Provider Department      5/23/2018 11:45 AM Myron Bob MD [facility]        Today's Diagnoses     Acute sinusitis with symptoms > 10 days    -  1    Sinus congestion        Anxiety           Follow-ups after your visit        Follow-up notes from your care team     Return in about 10 days (around 6/2/2018), or if symptoms worsen or fail to improve.      Your next 10 appointments already scheduled     Aug 08, 2018 10:00 AM CDT   [facility]    [address] [address] [facility] [id number] [phone]           Please do not eat 10-12 hours before your appointment if you are coming in fasting for labs on lipids, cholesterol, or glucose (sugar). This does not apply to pregnant women. Water, hot tea and black coffee (with nothing added) are okay. Do not drink other fluids, diet soda or chew gum.            Aug 15, 2018 10:00 AM CDT   PHYSICAL with [facility]ron Bob MD   [facility]    [address] [address] [address] [phone]              Who to contact     If you have questions or need follow up information about today's clinic visit or your schedule please contact [facility] directly at [phone].  Normal or non-critical lab and imaging results will be communicated to you by MyChart, letter or phone within 4 business days after the clinic has received the results. If you do not hear from us within 7 days, please contact the clinic through MyChart or phone. If you have a critical or abnormal lab result, we will notify you by phone as soon as possible.  Submit refill requests through [employer] or call your pharmacy and they will forward the refill request to us. Please allow 3 business  "days for your refill to be completed.          Additional Information About Your Visit        MyChart Information     [employer] gives you secure access to your electronic health record. If you see a primary care provider, you can also send messages to your care team and make appointments. If you have questions, please call your primary care clinic.  If you do not have a primary care provider, please call [phone] and they will assist you.        Care EveryWhere ID     This is your Care EveryWhere ID. This could be used by other organizations to access your [facility] medical records  [id number]        Your Vitals Were     Pulse Temperature Respirations Height Pulse Oximetry BMI (Body Mass Index)    90 98.3  F (36.8  C) (Oral) 20 5' 7\" (1.702 m) 97% 27.72 kg/m2       Blood Pressure from Last 3 Encounters:   05/23/18 (!) 144/91   11/17/17 117/75   08/15/17 123/80    Weight from Last 3 Encounters:   05/23/18 177 lb (80.3 kg)   11/17/17 174 lb (78.9 kg)   08/15/17 176 lb (79.8 kg)              Today, you had the following     No orders found for display         Today's Medication Changes          These changes are accurate as of 5/23/18 12:11 PM.  If you have any questions, ask your nurse or doctor.               Start taking these medicines.        Dose/Directions    cefdinir 300 MG capsule   Commonly known as:  OMNICEF   Used for:  Acute sinusitis with symptoms > 10 days   Started by:  Myron Bob MD        Dose:  300 mg   Take 1 capsule (300 mg) by mouth 2 times daily   Quantity:  20 capsule   Refills:  0         These medicines have changed or have updated prescriptions.        Dose/Directions    fluticasone 50 MCG/ACT spray   Commonly known as:  FLONASE   This may have changed:    - how much to take  - when to take this  - reasons to take this   Used for:  Sinus congestion   Changed by:  Myron Bob MD        Dose:  1 spray   Spray 1 spray into both nostrils 2 times daily   Quantity:  3 Bottle "   Refills:  3         Stop taking these medicines if you haven't already. Please contact your care team if you have questions.     atorvastatin 40 MG tablet   Commonly known as:  LIPITOR   Stopped by:  Myron Bob MD                Where to get your medicines      These medications were sent to [facility] - [place], [place] - [address]  [address] [facility] [address]     Phone:  [phone]     fluticasone 50 MCG/ACT spray         These medications were sent to [facility] [address] [facility] [facility] [address]  [address] [address] [address]     Phone:  [phone]     cefdinir 300 MG capsule         Some of these will need a paper prescription and others can be bought over the counter.  Ask your nurse if you have questions.     Bring a paper prescription for each of these medications     LORazepam 0.5 MG tablet                Primary Care Provider Office Phone # Fax #    Myron Bob [phone] [address] [facility] [address]        Equal Access to Services     [facility]: Hadii aad ku hadasho Soomaali, waaxda luqadaha, qaybta kaalmada adeegyada, waxay lesia pineda . So [facility] [phone].    ATENCIÓN: Si habla español, tiene a harris disposición servicios gratuitos de asistencia lingüística. Llame al [phone].    We comply with applicable federal civil rights laws and [place] laws. We do not discriminate on the basis of race, color, national origin, age, disability, sex, sexual orientation, or gender identity.            Thank you!     Thank you for choosing [facility]  for your care. Our goal is always to provide you with excellent care. Hearing back from our patients is one way we can continue to improve our services. Please take a few minutes to complete the written survey that you may receive in the mail after your visit with us. Thank you!              Your Updated Medication List - Protect others around you: Learn how to safely use, store and throw away your medicines at www.disposemymeds.org.          This list is accurate as of 5/23/18 12:11 PM.  Always use your most recent med list.                   Brand Name Dispense Instructions for use Diagnosis    amLODIPine 5 MG tablet    NORVASC    180 tablet    Take 2 tablets (10 mg) by mouth daily    Essential hypertension with goal blood pressure less than 140/90       ARTIFICIAL TEARS Oint     1 Tube    4 x a day as needed and at bedtime.    Keratitis, Dry eyes       aspirin 325 MG EC tablet      Take 325 mg by mouth daily. 1/2 pill a day.        cefdinir 300 MG capsule    OMNICEF    20 capsule    Take 1 capsule (300 mg) by mouth 2 times daily    Acute sinusitis with symptoms > 10 days       cetirizine 10 MG tablet    zyrTEC     Take 5 mg by mouth 2 times daily Taking 1/2 tablet once daily        FISH OIL      1 capsule.        fluticasone 50 MCG/ACT spray    FLONASE    3 Bottle    Spray 1 spray into both nostrils 2 times daily    Sinus congestion       Glucos-Roebrt-MSM-Nq-R-NgWa-SeCu Tabs      one daily        LORazepam 0.5 MG tablet    ATIVAN    30 tablet    Take 1 tablet (0.5 mg) by mouth daily as needed    Anxiety       meclizine 25 MG tablet    ANTIVERT    30 tablet    Take 1 tablet (25 mg) by mouth 3 times daily as needed    Vertigo       MULTIVITAMIN TABS   OR      2 TABLET DAILY        olopatadine 0.1 % ophthalmic solution    PATANOL    15 mL    Place 1 drop into both eyes 2 times daily.    Allergic conjunctivitis       PROSTATE PO      QD        ranitidine 150 MG tablet    ZANTAC    180 tablet    Take 1 tablet (150 mg) by mouth 2 times daily    Throat pain, Gastroesophageal reflux disease without esophagitis       triamcinolone 0.1 % cream    KENALOG    30 g    Apply sparingly to affected area three times daily for 14 days.    Eczema

## 2018-05-23 NOTE — PROGRESS NOTES
SUBJECTIVE:   Taqueria Bone is a 72 year old male presenting with a chief complaint of sinus pressure.  The patient first noted the onset of symptoms was 2 week(s) ago.  The patient (or parent) reports that he first had symptoms of nasal congestion and rhinorrhea . After that he started having symptoms of sinus pressure. After that he started having symptoms of left ear pain. Other symptoms that followed include rhinorrhea which is green .     He (or parent) denies: fever or teeth pain      The patient (or parent) reports that he had tried Zyrtec and it has not been helpful.  The patient has the following predisposing factors for infection:seasonal allergies.    He reports the he has been having some allergy symptom(s). Typically runny nose and post nasal drip     Patient Active Problem List   Diagnosis     Anxiety     Chronic nonallergic rhinitis     CARDIOVASCULAR SCREENING; LDL GOAL LESS THAN 130     GERD (gastroesophageal reflux disease)     Advanced directives, counseling/discussion     Insomnia     Medial epicondylitis     Episodic recurrent vertigo     BPH (benign prostatic hyperplasia)     Seasonal allergic rhinitis     Essential hypertension with goal blood pressure less than 140/90     Chronic low back pain     Right knee pain     Personal history of tobacco use, presenting hazards to health     10 year risk of MI or stroke 7.5% or greater, 18% in August 2017     Adenomatous polyp of colon, unspecified part of colon     Gastroesophageal reflux disease without esophagitis     Current Outpatient Prescriptions   Medication Sig Dispense Refill     amLODIPine (NORVASC) 5 MG tablet Take 2 tablets (10 mg) by mouth daily 180 tablet 3     Artificial Tear Ointment (ARTIFICIAL TEARS) OINT 4 x a day as needed and at bedtime. 1 Tube 0     aspirin 325 MG EC tablet Take 325 mg by mouth daily. 1/2 pill a day.        cetirizine (ZYRTEC) 10 MG tablet Take 5 mg by mouth 2 times daily Taking 1/2 tablet once daily        "FISH OIL 1 capsule.       fluticasone (FLONASE) 50 MCG/ACT spray Spray 2 sprays into both nostrils daily as needed for rhinitis 16 g 1     GLUCOS-AMANDA-MSM-GS-G-AOIL-SECU PO TABS one daily       LORazepam (ATIVAN) 0.5 MG tablet Take 1 tablet (0.5 mg) by mouth daily as needed 30 tablet 0     meclizine (ANTIVERT) 25 MG tablet Take 1 tablet (25 mg) by mouth 3 times daily as needed 30 tablet 1     MULTIVITAMIN TABS   OR 2 TABLET DAILY       olopatadine (PATANOL) 0.1 % ophthalmic solution Place 1 drop into both eyes 2 times daily. 15 mL 1     PROSTATE PO QD       ranitidine (ZANTAC) 150 MG tablet Take 1 tablet (150 mg) by mouth 2 times daily 180 tablet 3     triamcinolone (KENALOG) 0.1 % cream Apply sparingly to affected area three times daily for 14 days. 30 g 1     Social History   Substance Use Topics     Smoking status: Former Smoker     Quit date: 10/31/1979     Smokeless tobacco: Former User      Comment: 1979     Alcohol use 0.5 oz/week     1 Cans of beer per week      Comment: 1-2 beer daily           OBJECTIVE  :BP (!) 144/91  Pulse 90  Temp 98.3  F (36.8  C) (Oral)  Resp 20  Ht 5' 7\" (1.702 m)  Wt 177 lb (80.3 kg)  SpO2 97%  BMI 27.72 kg/m2  GENERAL APPEARANCE: healthy, alert and no distress  EYES: EOMI,  PERRL, conjunctiva clear  HENT: ear canals and TM's normal.  Nose and mouth without ulcers, erythema or lesions  HENT: left maxillary sinus tenderness   NECK: supple, nontender, no lymphadenopathy  RESP: lungs clear to auscultation - no rales, rhonchi or wheezes  CV: regular rates and rhythm, normal S1 S2, no murmur noted  ABDOMEN:  soft, nontender, no HSM or masses and bowel sounds normal  NEURO: Normal strength and tone, sensory exam grossly normal,  normal speech and mentation  SKIN: no suspicious lesions or rashes    ASSESSMENT:  Sinusitis    PLAN:  I recommended that the patient get lots of fluids and rest. and A prescription for omnicef  was given    During the visit I did wear a mask the entire " time I was in the exam room with the patient.

## 2018-05-23 NOTE — NURSING NOTE
"Chief Complaint   Patient presents with     Sinus Problem     x 2 weeks. ear pain. left side     Health Maintenance     screening       Initial BP (!) 144/91  Pulse 90  Temp 98.3  F (36.8  C) (Oral)  Resp 20  Ht 5' 7\" (1.702 m)  Wt 177 lb (80.3 kg)  SpO2 97%  BMI 27.72 kg/m2 Estimated body mass index is 27.72 kg/(m^2) as calculated from the following:    Height as of this encounter: 5' 7\" (1.702 m).    Weight as of this encounter: 177 lb (80.3 kg).  Medication Reconciliation: complete  Gianna Alejandro CMA  "

## 2018-07-17 ENCOUNTER — OFFICE VISIT (OUTPATIENT)
Dept: FAMILY MEDICINE | Facility: CLINIC | Age: 73
End: 2018-07-17
Payer: MEDICARE

## 2018-07-17 ENCOUNTER — TELEPHONE (OUTPATIENT)
Dept: FAMILY MEDICINE | Facility: CLINIC | Age: 73
End: 2018-07-17

## 2018-07-17 VITALS
WEIGHT: 172 LBS | BODY MASS INDEX: 26.94 KG/M2 | DIASTOLIC BLOOD PRESSURE: 89 MMHG | SYSTOLIC BLOOD PRESSURE: 135 MMHG | HEART RATE: 80 BPM | TEMPERATURE: 98.3 F | RESPIRATION RATE: 16 BRPM | OXYGEN SATURATION: 98 %

## 2018-07-17 DIAGNOSIS — R05.9 COUGH: Primary | ICD-10-CM

## 2018-07-17 PROCEDURE — 99213 OFFICE O/P EST LOW 20 MIN: CPT | Performed by: INTERNAL MEDICINE

## 2018-07-17 RX ORDER — ALBUTEROL SULFATE 90 UG/1
2 AEROSOL, METERED RESPIRATORY (INHALATION) EVERY 4 HOURS PRN
Qty: 1 INHALER | Refills: 0 | Status: SHIPPED | OUTPATIENT
Start: 2018-07-17 | End: 2018-10-03

## 2018-07-17 ASSESSMENT — PAIN SCALES - GENERAL: PAINLEVEL: NO PAIN (0)

## 2018-07-17 NOTE — NURSING NOTE
"Chief Complaint   Patient presents with     Cough     cough and congestion x 2 weeks       Initial /89  Pulse 80  Temp 98.3  F (36.8  C) (Oral)  Resp 16  Wt 172 lb (78 kg)  SpO2 98%  BMI 26.94 kg/m2 Estimated body mass index is 26.94 kg/(m^2) as calculated from the following:    Height as of 5/23/18: 5' 7\" (1.702 m).    Weight as of this encounter: 172 lb (78 kg).  Medication Reconciliation: complete  Patient and/or MA were masked during rooming process  Aissatou Laguerre MA        "

## 2018-07-17 NOTE — TELEPHONE ENCOUNTER
Patient states has had a hacky, phlegmy cough since July 4.  Wife was treated for same symptoms July 6 with antibiotic and recovered.    Patient denies any other symptoms.  No fever, chills, sweats.  No shortness of breath.  He had been over seas June 23 thru July 14; was in [place], [place], [place], [place], and [place].  He states no travel to [place] countries.  Reports was [place] family on plane on June 23 on way over to [place] but didn't seem ill.    Appointment made with MYCHAL Segal for this afternoon for his cough.  Catina Kohler RN

## 2018-07-17 NOTE — TELEPHONE ENCOUNTER
Patient calling, he would like to make an appointment for a cough. His infection screening questionnaire was positive for travel outside of the country and exposure to travelers to [place] who have symptoms of an illness.     Please call to determine best course of action.

## 2018-07-17 NOTE — MR AVS SNAPSHOT
After Visit Summary   7/17/2018    Taqueria Bone    MRN: [id number]           Patient Information     Date Of Birth          1945        Visit Information        Provider Department      7/17/2018 2:20 PM Gloria Segal MD [facility]        Today's Diagnoses     Cough    -  1       Follow-ups after your visit        Follow-up notes from your care team     Return in about 10 days (around 7/27/2018), or if symptoms worsen or fail to improve, for follow up with primary doctor.      Your next 10 appointments already scheduled     Aug 08, 2018 10:00 AM CDT   [facility]    [address] [place] [id number] [phone]           Please do not eat 10-12 hours before your appointment if you are coming in fasting for labs on lipids, cholesterol, or glucose (sugar). This does not apply to pregnant women. Water, hot tea and black coffee (with nothing added) are okay. Do not drink other fluids, diet soda or chew gum.            Aug 15, 2018 10:00 AM CDT   PHYSICAL with [facility]ron Bob [facility]   [facility]    [address] [facility] [address] [phone]              Who to contact     If you have questions or need follow up information about today's clinic visit or your schedule please contact [facility] directly at [phone].  Normal or non-critical lab and imaging results will be communicated to you by MyChart, letter or phone within 4 business days after the clinic has received the results. If you do not hear from us within 7 days, please contact the clinic through MyChart or phone. If you have a critical or abnormal lab result, we will notify you by phone as soon as possible.  Submit refill requests through [employer] or call your pharmacy and they will forward the refill request to us. Please allow 3 business days for your refill to be completed.           Additional Information About Your Visit        [employer] Information     "[employer] gives you secure access to your electronic health record. If you see a primary care provider, you can also send messages to your care team and make appointments. If you have questions, please call your primary care clinic.  If you do not have a primary care provider, please call [phone] and they will assist you.        Care EveryWhere ID     This is your Care EveryWhere ID. This could be used by other organizations to access your [facility] medical records  [id number]        Your Vitals Were     Pulse Temperature Respirations Pulse Oximetry BMI (Body Mass Index)       80 98.3  F (36.8  C) (Oral) 16 98% 26.94 kg/m2        Blood Pressure from Last 3 Encounters:   07/17/18 135/89   05/23/18 (!) 144/91   11/17/17 117/75    Weight from Last 3 Encounters:   07/17/18 172 lb (78 kg)   05/23/18 177 lb (80.3 kg)   11/17/17 174 lb (78.9 kg)              Today, you had the following     No orders found for display         Today's Medication Changes          These changes are accurate as of 7/17/18  2:55 PM.  If you have any questions, ask your nurse or doctor.               Start taking these medicines.        Dose/Directions    albuterol 108 (90 Base) MCG/ACT Inhaler   Commonly known as:  PROAIR HFA/PROVENTIL HFA/VENTOLIN HFA   Used for:  Cough   Started by:  Gloria Segal MD        Dose:  2 puff   Inhale 2 puffs into the lungs every 4 hours as needed   Quantity:  1 Inhaler   Refills:  0            Where to get your medicines      These medications were sent to [facility] [address], [address]  [address], [address], [facility] [address]     Phone:  [phone]     albuterol 108 (90 Base) MCG/ACT Inhaler                Primary Care Provider Office Phone # Fax #    Myron Bob [phone] [address] [address] [address]        Equal Access to Services      ASHLEY BABCOCK : Hadii aad ku dixie Lu, waaxda luqadaha, qaybta kaalmada aderebecca, joel lesia aliciamariza diego aliciawillie pineda . So [facility] [phone].    ATENCIÓN: Si habla kel, tiene a harris disposición servicios gratuitos de asistencia lingüística. Dejaame al [phone].    We comply with applicable federal civil rights laws and [place] laws. We do not discriminate on the basis of race, color, national origin, age, disability, sex, sexual orientation, or gender identity.            Thank you!     Thank you for choosing [facility]  [facility]. Our goal is always to provide you with excellent care. Hearing back from our patients is one way we can continue to improve our services. Please take a few minutes to complete the written survey that you may receive in the mail after your visit with us. Thank you!             Your Updated Medication List - Protect others around you: Learn how to safely use, store and throw away your medicines at www.disposemymeds.org.          This list is accurate as of 7/17/18  2:55 PM.  Always use your most recent med list.                   Brand Name Dispense Instructions for use Diagnosis    albuterol 108 (90 Base) MCG/ACT Inhaler    PROAIR HFA/PROVENTIL HFA/VENTOLIN HFA    1 Inhaler    Inhale 2 puffs into the lungs every 4 hours as needed    Cough       amLODIPine 5 MG tablet    NORVASC    180 tablet    Take 2 tablets (10 mg) by mouth daily    Essential hypertension with goal blood pressure less than 140/90       ARTIFICIAL TEARS Oint     1 Tube    4 x a day as needed and at bedtime.    Keratitis, Dry eyes       aspirin 325 MG EC tablet      Take 325 mg by mouth daily. 1/2 pill a day.        cetirizine 10 MG tablet    zyrTEC     Take 5 mg by mouth 2 times daily Taking 1/2 tablet once daily        FISH OIL      1 capsule.        fluticasone 50 MCG/ACT spray    FLONASE    3 Bottle    Spray 1 spray into both nostrils 2 times daily    Sinus congestion        Glucos-Robert-MSM-Bn-J-XrUt-SeCu Tabs      one daily        LORazepam 0.5 MG tablet    ATIVAN    30 tablet    Take 1 tablet (0.5 mg) by mouth daily as needed    Anxiety       meclizine 25 MG tablet    ANTIVERT    30 tablet    Take 1 tablet (25 mg) by mouth 3 times daily as needed    Vertigo       MULTIVITAMIN TABS   OR      1.5 TABLET DAILY        olopatadine 0.1 % ophthalmic solution    PATANOL    15 mL    Place 1 drop into both eyes 2 times daily.    Allergic conjunctivitis       PROSTATE PO      QD        ranitidine 150 MG tablet    ZANTAC    180 tablet    Take 1 tablet (150 mg) by mouth 2 times daily    Throat pain, Gastroesophageal reflux disease without esophagitis       triamcinolone 0.1 % cream    KENALOG    30 g    Apply sparingly to affected area three times daily for 14 days.    Eczema

## 2018-07-17 NOTE — PROGRESS NOTES
SUBJECTIVE:  Taqueria Bone is an 72 year old male who presents for cough.  Cough started about 2 weeks ago. Initially had runny nose while in [place].  Then started to cough during trip as well.  His wife had similar sxs and she took some abx for it and got better.  Travelled to several  cities on recent trip. Sometimes cough brings up a little bit of sputum. People in his [place] travel group had coughs during the trip, but no other known exposures.   Has some chronic ongoing post-nasal drip and takes 1/2 a zyrtec daily.  Uses flonase as well.  Cough hasn't really changed over the past week.  Returned from [place] two days ago.  Took some nyquil which helps some with cough at night.  No n/v/d.  Quit smoking in 1979.  No fevers, chills, or sweats.  No ear pain.  No skin rashes.         has a past medical history of Chronic abdominal pain; Chronic nonallergic rhinitis (7/1/10); Cyst (4-2009); GERD (gastroesophageal reflux disease); Hypertension; NONSPECIFIC MEDICAL HISTORY (1993); Oral submucosal fibrosis/tongue; and Smoker.  Social History     Social History     Marital status:      Spouse name: N/A     Number of children: N/A     Years of education: N/A     Social History Main Topics     Smoking status: Former Smoker     Quit date: 10/31/1979     Smokeless tobacco: Former User      Comment: 1979     Alcohol use 0.5 oz/week     1 Cans of beer per week      Comment: 1-2 beer daily     Drug use: No     Sexual activity: Yes     Partners: Female     Other Topics Concern     Parent/Sibling W/ Cabg, Mi Or Angioplasty Before 65f 55m? No     Social History Narrative     Family History   Problem Relation Age of Onset     Cancer Father      colon, spread to liver     Diabetes Father      Diabetes Mother      Hypertension Mother      Thyroid Disease Mother      Glaucoma Mother      HEART DISEASE Mother 91     MI      Cerebrovascular Disease Maternal Grandfather      Diabetes Paternal Grandfather       Hypertension Brother        ALLERGIES:  Azithromycin; Lactose; Levaquin; Penicillins; and Xylocaine [lidocaine hcl]    Current Outpatient Prescriptions   Medication     amLODIPine (NORVASC) 5 MG tablet     Artificial Tear Ointment (ARTIFICIAL TEARS) OINT     aspirin 325 MG EC tablet     cetirizine (ZYRTEC) 10 MG tablet     FISH OIL     fluticasone (FLONASE) 50 MCG/ACT spray     GLUCOS-AMANDA-MSM-SP-I-AENC-SECU PO TABS     LORazepam (ATIVAN) 0.5 MG tablet     meclizine (ANTIVERT) 25 MG tablet     MULTIVITAMIN TABS   OR     olopatadine (PATANOL) 0.1 % ophthalmic solution     PROSTATE PO     ranitidine (ZANTAC) 150 MG tablet     triamcinolone (KENALOG) 0.1 % cream     No current facility-administered medications for this visit.          ROS:  ROS is done and is negative for general/constitutional, eye, ENT, Respiratory, cardiovascular, GI, , Skin, musculoskeletal except as noted elsewhere.  All other review of systems negative except as noted elsewhere.      OBJECTIVE:  /89  Pulse 80  Temp 98.3  F (36.8  C) (Oral)  Resp 16  Wt 172 lb (78 kg)  SpO2 98%  BMI 26.94 kg/m2  GENERAL APPEARANCE: Alert, in no acute distress  EYES: normal  EARS: External ears normal. Canals clear. TM's normal.  NOSE:mildly inflamed mucosa  OROPHARYNX:mild erythema, no tonsillar hypertrophy and no exudates present  NECK:No adenopathy,masses or thyromegaly  RESP: normal and clear to auscultation, no crackles or wheezing  CV:regular rate and rhythm and no murmurs, clicks, or gallops  ABDOMEN: Abdomen soft, non-tender. BS normal. No masses, organomegaly  SKIN: no ulcers, lesions or rash  MUSCULOSKELETAL:Musculoskeletal normal      RESULTS  .  No results found for this or any previous visit (from the past 48 hour(s)).    ASSESSMENT/PLAN:    ASSESSMENT / PLAN:  (R05) Cough  (primary encounter diagnosis)  Comment: currently c/w viral or post-viral cough  Plan: albuterol (PROAIR HFA/PROVENTIL HFA/VENTOLIN         HFA) 108 (90 Base) MCG/ACT  Inhaler        Reviewed medication instructions and side effects. Follow up if experiences side effects.. I reviewed supportive care, expected course, and signs of concern.  Follow up as needed or if he does not improve within 10 day(s) or if worsens in any way.  Reviewed red flag symptoms and is to go to the ER if experiences any of these.  Continue his zyrtec and flonase and sinuses rinses.  If not improve as expected over the next 1-2 weeks, consider xrays or further work up.        See [facility] for orders, medications, letters, patient instructions    Gloria Segal M.D.

## 2018-07-18 ASSESSMENT — PATIENT HEALTH QUESTIONNAIRE - PHQ9: SUM OF ALL RESPONSES TO PHQ QUESTIONS 1-9: 4

## 2018-07-24 DIAGNOSIS — K21.9 GASTROESOPHAGEAL REFLUX DISEASE WITHOUT ESOPHAGITIS: ICD-10-CM

## 2018-07-24 DIAGNOSIS — R07.0 THROAT PAIN: ICD-10-CM

## 2018-07-27 ENCOUNTER — DOCUMENTATION ONLY (OUTPATIENT)
Dept: LAB | Facility: CLINIC | Age: 73
End: 2018-07-27

## 2018-07-27 DIAGNOSIS — Z13.6 CARDIOVASCULAR SCREENING; LDL GOAL LESS THAN 130: Primary | ICD-10-CM

## 2018-07-27 DIAGNOSIS — Z91.89 10 YEAR RISK OF MI OR STROKE 7.5% OR GREATER: ICD-10-CM

## 2018-07-27 DIAGNOSIS — I10 ESSENTIAL HYPERTENSION WITH GOAL BLOOD PRESSURE LESS THAN 140/90: ICD-10-CM

## 2018-07-27 DIAGNOSIS — K21.9 GASTROESOPHAGEAL REFLUX DISEASE, ESOPHAGITIS PRESENCE NOT SPECIFIED: ICD-10-CM

## 2018-07-27 NOTE — PROGRESS NOTES
Please review and sign Pending Pre-visit Labs in [facility].Labs 08/08/18 and physical 08/15/18.   Alicia HONEYCUTT

## 2018-07-27 NOTE — PROGRESS NOTES
Patient has a lab appointment on 08/08/2018. Per the lab schedule scrubbing protocol they are not due for anything. Please review chart and send orders or let patient know appointment is not needed.    Thank you,  Damaris Graves

## 2018-08-27 ENCOUNTER — OFFICE VISIT (OUTPATIENT)
Dept: URGENT CARE | Facility: URGENT CARE | Age: 73
End: 2018-08-27
Payer: MEDICARE

## 2018-08-27 VITALS
BODY MASS INDEX: 28.04 KG/M2 | WEIGHT: 179 LBS | RESPIRATION RATE: 16 BRPM | TEMPERATURE: 99.2 F | HEART RATE: 68 BPM | SYSTOLIC BLOOD PRESSURE: 142 MMHG | OXYGEN SATURATION: 95 % | DIASTOLIC BLOOD PRESSURE: 88 MMHG

## 2018-08-27 DIAGNOSIS — J40 BRONCHITIS, COMPLICATED: Primary | ICD-10-CM

## 2018-08-27 PROCEDURE — 99213 OFFICE O/P EST LOW 20 MIN: CPT | Performed by: PHYSICIAN ASSISTANT

## 2018-08-27 RX ORDER — PREDNISONE 20 MG/1
20 TABLET ORAL DAILY
Qty: 5 TABLET | Refills: 0 | Status: SHIPPED | OUTPATIENT
Start: 2018-08-27 | End: 2018-09-01

## 2018-08-27 RX ORDER — DOXYCYCLINE 100 MG/1
100 CAPSULE ORAL 2 TIMES DAILY
Qty: 20 CAPSULE | Refills: 0 | Status: SHIPPED | OUTPATIENT
Start: 2018-08-27 | End: 2018-09-06

## 2018-08-27 RX ORDER — ALBUTEROL SULFATE 90 UG/1
AEROSOL, METERED RESPIRATORY (INHALATION)
Qty: 1 INHALER | Refills: 0 | Status: SHIPPED | OUTPATIENT
Start: 2018-08-27 | End: 2018-10-03

## 2018-08-27 RX ORDER — CODEINE PHOSPHATE AND GUAIFENESIN 10; 100 MG/5ML; MG/5ML
1-2 SOLUTION ORAL EVERY 6 HOURS PRN
Qty: 120 ML | Refills: 0 | Status: SHIPPED | OUTPATIENT
Start: 2018-08-27 | End: 2018-11-09

## 2018-08-27 ASSESSMENT — ENCOUNTER SYMPTOMS
EYE DISCHARGE: 0
EYE ITCHING: 0
CONSTIPATION: 0
PALPITATIONS: 0
DIARRHEA: 0
SINUS PRESSURE: 0
SORE THROAT: 0
ARTHRALGIAS: 0
RHINORRHEA: 0
UNEXPECTED WEIGHT CHANGE: 0
ABDOMINAL PAIN: 0
EYE REDNESS: 0
EYE PAIN: 0
SHORTNESS OF BREATH: 0
COUGH: 1
FATIGUE: 0
WHEEZING: 0
CHILLS: 0
MYALGIAS: 0
NAUSEA: 0
SINUS PAIN: 0
FEVER: 0
VOMITING: 0

## 2018-08-27 ASSESSMENT — PAIN SCALES - GENERAL: PAINLEVEL: NO PAIN (0)

## 2018-08-27 NOTE — NURSING NOTE
"Chief Complaint   Patient presents with     Cough     Co cough for 2 + weeks. Was getting better with use of inhaler, now its back and worse.       Initial /88  Pulse 68  Temp 99.2  F (37.3  C) (Oral)  Resp 16  Wt 179 lb (81.2 kg)  SpO2 95%  BMI 28.04 kg/m2 Estimated body mass index is 28.04 kg/(m^2) as calculated from the following:    Height as of 5/23/18: 5' 7\" (1.702 m).    Weight as of this encounter: 179 lb (81.2 kg)..  BP completed using cuff size: jyoti Roque CMA    "

## 2018-08-27 NOTE — PROGRESS NOTES
SUBJECTIVE:   Taqueria Bone is a 72 year old male presenting with a chief complaint of   Chief Complaint   Patient presents with     Cough     Co cough for 2 + weeks. Was getting better with use of inhaler, now its back and worse.       He is an established patient of [facility].    URI Adult    Onset of symptoms was 5+ week(s) ago.  Course of illness is worsening.    Severity moderate  Current and Associated symptoms: cough - productive  Treatment measures tried include Tylenol and Inhaler (name: albuterol every).  Predisposing factors include None.      Review of Systems   Constitutional: Negative for chills, fatigue, fever and unexpected weight change.   HENT: Negative for congestion, ear pain, rhinorrhea, sinus pain, sinus pressure and sore throat.    Eyes: Negative for pain, discharge, redness and itching.   Respiratory: Positive for cough. Negative for shortness of breath and wheezing.    Cardiovascular: Negative for chest pain, palpitations and leg swelling.   Gastrointestinal: Negative for abdominal pain, constipation, diarrhea, nausea and vomiting.   Musculoskeletal: Negative for arthralgias and myalgias.   Skin: Negative for rash.       Past Medical History:   Diagnosis Date     Chronic abdominal pain     ? IB     Chronic nonallergic rhinitis 7/1/10    skin tests all negative     Cyst 4-2009    X2 on back     GERD (gastroesophageal reflux disease)      Hypertension      NONSPECIFIC MEDICAL HISTORY 1993    RT - I H      Oral submucosal fibrosis/tongue      Smoker     quit '80     Family History   Problem Relation Age of Onset     Cancer Father      colon, spread to liver     Diabetes Father      Diabetes Mother      Hypertension Mother      Thyroid Disease Mother      Glaucoma Mother      HEART DISEASE Mother 91     MI      Cerebrovascular Disease Maternal Grandfather      Diabetes Paternal Grandfather      Hypertension Brother      Current Outpatient Prescriptions   Medication Sig Dispense Refill      albuterol (PROAIR HFA/PROVENTIL HFA/VENTOLIN HFA) 108 (90 Base) MCG/ACT inhaler Inhale 2 puffs every 4-6 hours as needed for cough, wheezing, or shortness of breath 1 Inhaler 0     doxycycline monohydrate 100 MG capsule Take 1 capsule (100 mg) by mouth 2 times daily for 10 days 20 capsule 0     guaiFENesin-codeine (ROBITUSSIN AC) 100-10 MG/5ML SOLN solution Take 5-10 mLs by mouth every 6 hours as needed for cough 120 mL 0     predniSONE (DELTASONE) 20 MG tablet Take 1 tablet (20 mg) by mouth daily for 5 days 5 tablet 0     albuterol (PROAIR HFA/PROVENTIL HFA/VENTOLIN HFA) 108 (90 Base) MCG/ACT Inhaler Inhale 2 puffs into the lungs every 4 hours as needed 1 Inhaler 0     amLODIPine (NORVASC) 5 MG tablet Take 2 tablets (10 mg) by mouth daily 180 tablet 3     Artificial Tear Ointment (ARTIFICIAL TEARS) OINT 4 x a day as needed and at bedtime. 1 Tube 0     aspirin 325 MG EC tablet Take 325 mg by mouth daily. 1/2 pill a day.        cetirizine (ZYRTEC) 10 MG tablet Take 5 mg by mouth 2 times daily Taking 1/2 tablet once daily       FISH OIL 1 capsule.       fluticasone (FLONASE) 50 MCG/ACT spray Spray 1 spray into both nostrils 2 times daily 3 Bottle 3     GLUCOS-AMANDA-MSM-ZX-Z-HSIO-SECU PO TABS one daily       LORazepam (ATIVAN) 0.5 MG tablet Take 1 tablet (0.5 mg) by mouth daily as needed 30 tablet 0     meclizine (ANTIVERT) 25 MG tablet Take 1 tablet (25 mg) by mouth 3 times daily as needed 30 tablet 1     MULTIVITAMIN TABS   OR 1.5 TABLET DAILY       olopatadine (PATANOL) 0.1 % ophthalmic solution Place 1 drop into both eyes 2 times daily. 15 mL 1     PROSTATE PO QD       ranitidine (ZANTAC) 150 MG tablet TAKE 1 TABLET TWICE A  tablet 0     triamcinolone (KENALOG) 0.1 % cream Apply sparingly to affected area three times daily for 14 days. (Patient not taking: Reported on 7/17/2018) 30 g 1     Social History   Substance Use Topics     Smoking status: Former Smoker     Quit date: 10/31/1979     Smokeless tobacco:  Former User      Comment: 1979     Alcohol use 0.5 oz/week     1 Cans of beer per week      Comment: 1-2 beer daily       OBJECTIVE  /88  Pulse 68  Temp 99.2  F (37.3  C) (Oral)  Resp 16  Wt 179 lb (81.2 kg)  SpO2 95%  BMI 28.04 kg/m2    Physical Exam   Constitutional: He appears well-developed and well-nourished. No distress.   HENT:   Head: Normocephalic and atraumatic.   Right Ear: Tympanic membrane and ear canal normal.   Left Ear: Tympanic membrane and ear canal normal.   Mouth/Throat: Oropharynx is clear and moist.   Eyes: Conjunctivae are normal. Pupils are equal, round, and reactive to light.   Cardiovascular: Normal rate and regular rhythm.    Pulmonary/Chest: Effort normal.   Slight wheeze on the left; otherwise clear   Skin: Skin is warm and dry. No rash noted.   Psychiatric: He has a normal mood and affect. His behavior is normal.       Labs:  No results found for this or any previous visit (from the past 24 hour(s)).    X-Ray was not done.    ASSESSMENT:      ICD-10-CM    1. Bronchitis, complicated J40 doxycycline monohydrate 100 MG capsule     predniSONE (DELTASONE) 20 MG tablet     guaiFENesin-codeine (ROBITUSSIN AC) 100-10 MG/5ML SOLN solution     albuterol (PROAIR HFA/PROVENTIL HFA/VENTOLIN HFA) 108 (90 Base) MCG/ACT inhaler        Medical Decision Making:    Differential Diagnosis:  URI Adult/Peds:  Bronchitis-viral, Bronchospasm, Pneumonia and Viral syndrome    Serious Comorbid Conditions:  Adult:  None    PLAN:    Will treat with doxycycline x 10 days. Also prescribed prednisone 20mg once daily x 5 days, albuterol 2 puffs every 4-6hrs as needed, and guaifenesin/codeine  5-10mL every 6hrs as needed. Get plenty of rest and push fluids. Can use Tylenol and/or ibuprofen as needed for pain and/or fever control. Follow up as needed.      Followup:    If not improving or if condition worsens, follow up with your Primary Care Provider    There are no Patient Instructions on file for this  visit.

## 2018-08-27 NOTE — MR AVS SNAPSHOT
After Visit Summary   8/27/2018    Taqueria Bone    MRN: [id number]           Patient Information     Date Of Birth          1945        Visit Information        Provider Department      8/27/2018 5:05 PM Laura Vences PA-C [facility]        Today's Diagnoses     Bronchitis, complicated    -  1       Follow-ups after your visit        Follow-up notes from your care team     Return if symptoms worsen or fail to improve.      Your next 10 appointments already scheduled     Sep 26, 2018  9:00 AM CDT   [facility]   [facility]    [address] [facility] [address] [facility] [id number] [phone]           Please do not eat 10-12 hours before your appointment if you are coming in fasting for labs on lipids, cholesterol, or glucose (sugar). This does not apply to pregnant women. Water, hot tea and black coffee (with nothing added) are okay. Do not drink other fluids, diet soda or chew gum.            Oct 03, 2018 11:00 AM CDT   PHYSICAL with Myron Bob MD   [facility]    [address] [facility] [address] [facility] [id number] [phone]              Who to contact     If you have questions or need follow up information about today's clinic visit or your schedule please contact [facility] directly at [phone].  Normal or non-critical lab and imaging results will be communicated to you by MyChart, letter or phone within 4 business days after the clinic has received the results. If you do not hear from us within 7 days, please contact the clinic through MyChart or phone. If you have a critical or abnormal lab result, we will notify you by phone as soon as possible.  Submit refill requests through [employer] or call your pharmacy and they will forward the refill request to us. Please allow 3 business days for your refill to be completed.          Additional Information About Your Visit         [employer] Information     [employer] gives you secure access to your electronic health record. If you see a primary care provider, you can also send messages to your care team and make appointments. If you have questions, please call your primary care clinic.  If you do not have a primary care provider, please call [phone] and they will assist you.        Care EveryWhere ID     This is your Care EveryWhere ID. This could be used by other organizations to access your [facility] medical records  [id number]        Your Vitals Were     Pulse Temperature Respirations Pulse Oximetry BMI (Body Mass Index)       68 99.2  F (37.3  C) (Oral) 16 95% 28.04 kg/m2        Blood Pressure from Last 3 Encounters:   08/27/18 142/88   07/17/18 135/89   05/23/18 (!) 144/91    Weight from Last 3 Encounters:   08/27/18 179 lb (81.2 kg)   07/17/18 172 lb (78 kg)   05/23/18 177 lb (80.3 kg)              Today, you had the following     No orders found for display         Today's Medication Changes          These changes are accurate as of 8/27/18  8:39 PM.  If you have any questions, ask your nurse or doctor.               Start taking these medicines.        Dose/Directions    doxycycline monohydrate 100 MG capsule   Used for:  Bronchitis, complicated   Started by:  Laura Vences PA-C        Dose:  100 mg   Take 1 capsule (100 mg) by mouth 2 times daily for 10 days   Quantity:  20 capsule   Refills:  0       guaiFENesin-codeine 100-10 MG/5ML Soln solution   Commonly known as:  ROBITUSSIN AC   Used for:  Bronchitis, complicated   Started by:  Laura Vences PA-C        Dose:  1-2 tsp.   Take 5-10 mLs by mouth every 6 hours as needed for cough   Quantity:  120 mL   Refills:  0       predniSONE 20 MG tablet   Commonly known as:  DELTASONE   Used for:  Bronchitis, complicated   Started by:  Laura Vences PA-C        Dose:  20 mg   Take 1 tablet (20 mg) by mouth daily for 5 days   Quantity:  5 tablet   Refills:  0         These  medicines have changed or have updated prescriptions.        Dose/Directions    * albuterol 108 (90 Base) MCG/ACT inhaler   Commonly known as:  PROAIR HFA/PROVENTIL HFA/VENTOLIN HFA   This may have changed:  Another medication with the same name was added. Make sure you understand how and when to take each.   Used for:  Cough   Changed by:  Laura Vences PA-C        Dose:  2 puff   Inhale 2 puffs into the lungs every 4 hours as needed   Quantity:  1 Inhaler   Refills:  0       * albuterol 108 (90 Base) MCG/ACT inhaler   Commonly known as:  PROAIR HFA/PROVENTIL HFA/VENTOLIN HFA   This may have changed:  You were already taking a medication with the same name, and this prescription was added. Make sure you understand how and when to take each.   Used for:  Bronchitis, complicated   Changed by:  Laura Vences PA-C        Inhale 2 puffs every 4-6 hours as needed for cough, wheezing, or shortness of breath   Quantity:  1 Inhaler   Refills:  0       * Notice:  This list has 2 medication(s) that are the same as other medications prescribed for you. Read the directions carefully, and ask your doctor or other care provider to review them with you.         Where to get your medicines      These medications were sent to [facility] [address] [address]     Phone:  [phone]     albuterol 108 (90 Base) MCG/ACT inhaler         These medications were sent [place] [facility] [place] [address] [facility] [address]  [address] [address] [address]     Phone:  [phone]     doxycycline monohydrate 100 MG capsule    predniSONE 20 MG tablet         Some of these will need a paper prescription and others can be bought over the counter.  Ask your nurse if you have questions.     Bring a paper prescription for each of these medications     guaiFENesin-codeine 100-10 MG/5ML Soln solution                Primary Care  Provider Office Phone # Fax #    Myron Bob [phone] [address] [facility] [address]        Equal Access to Services     ASHLEY BABCOCK : Nara triston prater nimishao Soradha, waaxda luqadaha, qaybta kaalmada beto, joel montenegro lajosephmariza acosta. So [facility] [phone].    ATENCIÓN: Si habla español, tiene a harris disposición servicios gratuitos de asistencia lingüística. Llame al [phone].    We comply with applicable federal civil rights laws and [place] laws. We do not discriminate on the basis of race, color, national origin, age, disability, sex, sexual orientation, or gender identity.            Thank you!     Thank you for choosing [facility]  for your care. Our goal is always to provide you with excellent care. Hearing back from our patients is one way we can continue to improve our services. Please take a few minutes to complete the written survey that you may receive in the mail after your visit with us. Thank you!             Your Updated Medication List - Protect others around you: Learn how to safely use, store and throw away your medicines at www.disposemymeds.org.          This list is accurate as of 8/27/18  8:39 PM.  Always use your most recent med list.                   Brand Name Dispense Instructions for use Diagnosis    * albuterol 108 (90 Base) MCG/ACT inhaler    PROAIR HFA/PROVENTIL HFA/VENTOLIN HFA    1 Inhaler    Inhale 2 puffs into the lungs every 4 hours as needed    Cough       * albuterol 108 (90 Base) MCG/ACT inhaler    PROAIR HFA/PROVENTIL HFA/VENTOLIN HFA    1 Inhaler    Inhale 2 puffs every 4-6 hours as needed for cough, wheezing, or shortness of breath    Bronchitis, complicated       amLODIPine 5 MG tablet    NORVASC    180 tablet    Take 2 tablets (10 mg) by mouth daily    Essential hypertension with goal blood pressure less than 140/90       ARTIFICIAL TEARS Oint     1 Tube    4 x a day as needed and at bedtime.     Keratitis, Dry eyes       aspirin 325 MG EC tablet      Take 325 mg by mouth daily. 1/2 pill a day.        cetirizine 10 MG tablet    zyrTEC     Take 5 mg by mouth 2 times daily Taking 1/2 tablet once daily        doxycycline monohydrate 100 MG capsule     20 capsule    Take 1 capsule (100 mg) by mouth 2 times daily for 10 days    Bronchitis, complicated       FISH OIL      1 capsule.        fluticasone 50 MCG/ACT spray    FLONASE    3 Bottle    Spray 1 spray into both nostrils 2 times daily    Sinus congestion       Glucos-Robert-MSM-Oi-V-HvWt-SeCu Tabs      one daily        guaiFENesin-codeine 100-10 MG/5ML Soln solution    ROBITUSSIN AC    120 mL    Take 5-10 mLs by mouth every 6 hours as needed for cough    Bronchitis, complicated       LORazepam 0.5 MG tablet    ATIVAN    30 tablet    Take 1 tablet (0.5 mg) by mouth daily as needed    Anxiety       meclizine 25 MG tablet    ANTIVERT    30 tablet    Take 1 tablet (25 mg) by mouth 3 times daily as needed    Vertigo       MULTIVITAMIN TABS   OR      1.5 TABLET DAILY        olopatadine 0.1 % ophthalmic solution    PATANOL    15 mL    Place 1 drop into both eyes 2 times daily.    Allergic conjunctivitis       predniSONE 20 MG tablet    DELTASONE    5 tablet    Take 1 tablet (20 mg) by mouth daily for 5 days    Bronchitis, complicated       PROSTATE PO      QD        ranitidine 150 MG tablet    ZANTAC    180 tablet    TAKE 1 TABLET TWICE A DAY    Throat pain, Gastroesophageal reflux disease without esophagitis       triamcinolone 0.1 % cream    KENALOG    30 g    Apply sparingly to affected area three times daily for 14 days.    Eczema       * Notice:  This list has 2 medication(s) that are the same as other medications prescribed for you. Read the directions carefully, and ask your doctor or other care provider to review them with you.

## 2018-08-31 DIAGNOSIS — I10 ESSENTIAL HYPERTENSION WITH GOAL BLOOD PRESSURE LESS THAN 140/90: ICD-10-CM

## 2018-08-31 RX ORDER — AMLODIPINE BESYLATE 5 MG/1
TABLET ORAL
Qty: 180 TABLET | Refills: 0 | Status: SHIPPED | OUTPATIENT
Start: 2018-08-31 | End: 2019-06-04

## 2018-08-31 NOTE — TELEPHONE ENCOUNTER
BP Readings from Last 6 Encounters:   08/27/18 142/88   07/17/18 135/89   05/23/18 (!) 144/91   11/17/17 117/75   08/15/17 123/80   01/07/17 138/88     Has had multiple acute office visit.  Last physical 11/17/17.  Please advise on refill.

## 2018-09-26 DIAGNOSIS — I10 ESSENTIAL HYPERTENSION WITH GOAL BLOOD PRESSURE LESS THAN 140/90: ICD-10-CM

## 2018-09-26 DIAGNOSIS — Z91.89 10 YEAR RISK OF MI OR STROKE 7.5% OR GREATER: ICD-10-CM

## 2018-09-26 DIAGNOSIS — K21.9 GASTROESOPHAGEAL REFLUX DISEASE, ESOPHAGITIS PRESENCE NOT SPECIFIED: ICD-10-CM

## 2018-09-26 DIAGNOSIS — Z13.6 CARDIOVASCULAR SCREENING; LDL GOAL LESS THAN 130: ICD-10-CM

## 2018-09-26 PROBLEM — R73.02 IGT (IMPAIRED GLUCOSE TOLERANCE): Status: ACTIVE | Noted: 2018-09-26

## 2018-09-26 LAB
ALBUMIN SERPL-MCNC: 3.7 G/DL (ref 3.4–5)
ALP SERPL-CCNC: 106 U/L (ref 40–150)
ALT SERPL W P-5'-P-CCNC: 33 U/L (ref 0–70)
ANION GAP SERPL CALCULATED.3IONS-SCNC: 4 MMOL/L (ref 3–14)
AST SERPL W P-5'-P-CCNC: 23 U/L (ref 0–45)
BILIRUB SERPL-MCNC: 0.5 MG/DL (ref 0.2–1.3)
BUN SERPL-MCNC: 14 MG/DL (ref 7–30)
CALCIUM SERPL-MCNC: 9.7 MG/DL (ref 8.5–10.1)
CHLORIDE SERPL-SCNC: 105 MMOL/L (ref 94–109)
CHOLEST SERPL-MCNC: 188 MG/DL
CO2 SERPL-SCNC: 32 MMOL/L (ref 20–32)
CREAT SERPL-MCNC: 0.96 MG/DL (ref 0.66–1.25)
GFR SERPL CREATININE-BSD FRML MDRD: 77 ML/MIN/1.7M2
GLUCOSE SERPL-MCNC: 103 MG/DL (ref 70–99)
HDLC SERPL-MCNC: 55 MG/DL
LDLC SERPL CALC-MCNC: 109 MG/DL
NONHDLC SERPL-MCNC: 133 MG/DL
POTASSIUM SERPL-SCNC: 4.2 MMOL/L (ref 3.4–5.3)
PROT SERPL-MCNC: 7.5 G/DL (ref 6.8–8.8)
SODIUM SERPL-SCNC: 141 MMOL/L (ref 133–144)
TRIGL SERPL-MCNC: 118 MG/DL

## 2018-09-26 PROCEDURE — 80053 COMPREHEN METABOLIC PANEL: CPT | Performed by: FAMILY MEDICINE

## 2018-09-26 PROCEDURE — 80061 LIPID PANEL: CPT | Performed by: FAMILY MEDICINE

## 2018-09-26 PROCEDURE — 36415 COLL VENOUS BLD VENIPUNCTURE: CPT | Performed by: FAMILY MEDICINE

## 2018-09-26 NOTE — PROGRESS NOTES
I have reviewed the schedule of future appointments and this patient has an appointment scheduled for 10-3-2018.    Visit date not found

## 2018-10-03 ENCOUNTER — OFFICE VISIT (OUTPATIENT)
Dept: FAMILY MEDICINE | Facility: CLINIC | Age: 73
End: 2018-10-03
Payer: MEDICARE

## 2018-10-03 VITALS
RESPIRATION RATE: 20 BRPM | WEIGHT: 180 LBS | OXYGEN SATURATION: 97 % | BODY MASS INDEX: 28.19 KG/M2 | HEART RATE: 107 BPM | DIASTOLIC BLOOD PRESSURE: 78 MMHG | TEMPERATURE: 99.2 F | SYSTOLIC BLOOD PRESSURE: 122 MMHG

## 2018-10-03 DIAGNOSIS — Z00.00 MEDICARE ANNUAL WELLNESS VISIT, SUBSEQUENT: Primary | ICD-10-CM

## 2018-10-03 DIAGNOSIS — R35.1 BENIGN PROSTATIC HYPERPLASIA WITH NOCTURIA: ICD-10-CM

## 2018-10-03 DIAGNOSIS — Z23 NEED FOR PROPHYLACTIC VACCINATION AND INOCULATION AGAINST INFLUENZA: ICD-10-CM

## 2018-10-03 DIAGNOSIS — Z91.89 10 YEAR RISK OF MI OR STROKE 7.5% OR GREATER: ICD-10-CM

## 2018-10-03 DIAGNOSIS — N40.1 BENIGN PROSTATIC HYPERPLASIA WITH NOCTURIA: ICD-10-CM

## 2018-10-03 PROCEDURE — G0439 PPPS, SUBSEQ VISIT: HCPCS | Performed by: FAMILY MEDICINE

## 2018-10-03 RX ORDER — DUTASTERIDE 0.5 MG/1
0.5 CAPSULE, LIQUID FILLED ORAL DAILY
Qty: 90 CAPSULE | Refills: 3 | Status: SHIPPED | OUTPATIENT
Start: 2018-10-03 | End: 2018-11-09

## 2018-10-03 RX ORDER — ATORVASTATIN CALCIUM 20 MG/1
20 TABLET, FILM COATED ORAL AT BEDTIME
Qty: 90 TABLET | Refills: 3 | Status: SHIPPED | OUTPATIENT
Start: 2018-10-03 | End: 2019-08-19

## 2018-10-03 ASSESSMENT — PAIN SCALES - GENERAL: PAINLEVEL: NO PAIN (0)

## 2018-10-03 ASSESSMENT — ANXIETY QUESTIONNAIRES
6. BECOMING EASILY ANNOYED OR IRRITABLE: SEVERAL DAYS
1. FEELING NERVOUS, ANXIOUS, OR ON EDGE: NOT AT ALL
5. BEING SO RESTLESS THAT IT IS HARD TO SIT STILL: NOT AT ALL
2. NOT BEING ABLE TO STOP OR CONTROL WORRYING: NOT AT ALL
7. FEELING AFRAID AS IF SOMETHING AWFUL MIGHT HAPPEN: NOT AT ALL
IF YOU CHECKED OFF ANY PROBLEMS ON THIS QUESTIONNAIRE, HOW DIFFICULT HAVE THESE PROBLEMS MADE IT FOR YOU TO DO YOUR WORK, TAKE CARE OF THINGS AT HOME, OR GET ALONG WITH OTHER PEOPLE: NOT DIFFICULT AT ALL
3. WORRYING TOO MUCH ABOUT DIFFERENT THINGS: SEVERAL DAYS
GAD7 TOTAL SCORE: 2

## 2018-10-03 ASSESSMENT — ENCOUNTER SYMPTOMS
FREQUENCY: 1
DIZZINESS: 0
ABDOMINAL PAIN: 0
CONSTIPATION: 0
EYE PAIN: 0
WEAKNESS: 1
DYSURIA: 0
CHILLS: 1
DIARRHEA: 0
SHORTNESS OF BREATH: 0
COUGH: 1
PARESTHESIAS: 0
JOINT SWELLING: 0
HEARTBURN: 1
PALPITATIONS: 0
HEMATOCHEZIA: 0
HEADACHES: 0
ARTHRALGIAS: 1
MYALGIAS: 1
NAUSEA: 0
SORE THROAT: 1
HEMATURIA: 0

## 2018-10-03 ASSESSMENT — ACTIVITIES OF DAILY LIVING (ADL)
I_NEED_ASSISTANCE_FOR_THE_FOLLOWING_DAILY_ACTIVITIES:: NO ASSISTANCE IS NEEDED
CURRENT_FUNCTION: NO ASSISTANCE NEEDED

## 2018-10-03 ASSESSMENT — PATIENT HEALTH QUESTIONNAIRE - PHQ9: 5. POOR APPETITE OR OVEREATING: NOT AT ALL

## 2018-10-03 NOTE — MR AVS SNAPSHOT
After Visit Summary   10/3/2018    Taqueria Bone    MRN: [id number]           Patient Information     Date Of Birth          1945        Visit Information        Provider Department      10/3/2018 11:00 AM Myron Bob MD [facility] [place]        Today's Diagnoses     Medicare annual wellness visit, subsequent    -  1    Need for prophylactic vaccination and inoculation against influenza        10 year risk of MI or stroke 7.5% or greater, 20.7% in October 2018        Benign prostatic hyperplasia with nocturia          Care Instructions      Preventive Health Recommendations:       Male Ages 65 and over    Yearly exam:             See your health care provider every year in order to  o   Review health changes.   o   Discuss preventive care.    o   Review your medicines if your doctor has prescribed any.    Talk with your health care provider about whether you should have a test to screen for prostate cancer (PSA).    Every 3 years, have a diabetes test (fasting glucose). If you are at risk for diabetes, you should have this test more often.    Every 5 years, have a cholesterol test. Have this test more often if you are at risk for high cholesterol or heart disease.     Every 10 years, have a colonoscopy. Or, have a yearly FIT test (stool test). These exams will check for colon cancer.    Talk to with your health care provider about screening for Abdominal Aortic Aneurysm if you have a family history of AAA or have a history of smoking.  Shots:     Get a flu shot each year.     Get a tetanus shot every 10 years.     Talk to your doctor about your pneumonia vaccines. There are now two you should receive - Pneumovax (PPSV 23) and Prevnar (PCV 13).    Talk to your pharmacist about a shingles vaccine.     Talk to your doctor about the hepatitis B vaccine.  Nutrition:     Eat at least 5 servings of fruits and vegetables each day.     Eat whole-grain bread, whole-wheat pasta and brown  rice instead of white grains and rice.     Get adequate Calcium and Vitamin D.   Lifestyle    Exercise for at least 150 minutes a week (30 minutes a day, 5 days a week). This will help you control your weight and prevent disease.     Limit alcohol to one drink per day.     No smoking.     Wear sunscreen to prevent skin cancer.     See your dentist every six months for an exam and cleaning.     See your eye doctor every 1 to 2 years to screen for conditions such as glaucoma, macular degeneration and cataracts.          Follow-ups after your visit        Who to contact     If you have questions or need follow up information about today's clinic visit or your schedule please contact [facility] directly at [phone].  Normal or non-critical lab and imaging results will be communicated to you by MyChart, letter or phone within 4 business days after the clinic has received the results. If you do not hear from us within 7 days, please contact the clinic through [employer] or phone. If you have a critical or abnormal lab result, we will notify you by phone as soon as possible.  Submit refill requests through [employer] or call your pharmacy and they will forward the refill request to us. Please allow 3 business days for your refill to be completed.          Additional Information About Your Visit        [employer] Information     [employer] gives you secure access to your electronic health record. If you see a primary care provider, you can also send messages to your care team and make appointments. If you have questions, please call your primary care clinic.  If you do not have a primary care provider, please call [phone] and they will assist you.        Care EveryWhere ID     This is your Care EveryWhere ID. This could be used by other organizations to access your [place] medical records  [id number]        Your Vitals Were     Pulse Temperature Respirations Pulse Oximetry BMI (Body Mass Index)       107 99.2   F (37.3  C) (Oral) 20 97% 28.19 kg/m2        Blood Pressure from Last 3 Encounters:   10/03/18 122/78   08/27/18 142/88   07/17/18 135/89    Weight from Last 3 Encounters:   10/03/18 180 lb (81.6 kg)   08/27/18 179 lb (81.2 kg)   07/17/18 172 lb (78 kg)              Today, you had the following     No orders found for display         Today's Medication Changes          These changes are accurate as of 10/3/18 12:09 PM.  If you have any questions, ask your nurse or doctor.               Start taking these medicines.        Dose/Directions    atorvastatin 20 MG tablet   Commonly known as:  LIPITOR   Used for:  10 year risk of MI or stroke 7.5% or greater   Started by:  Myron Bob MD        Dose:  20 mg   Take 1 tablet (20 mg) by mouth At Bedtime   Quantity:  90 tablet   Refills:  3       dutasteride 0.5 MG capsule   Commonly known as:  AVODART   Used for:  Benign prostatic hyperplasia with nocturia   Started by:  Myron Bob MD        Dose:  0.5 mg   Take 1 capsule (0.5 mg) by mouth daily   Quantity:  90 capsule   Refills:  3         Stop taking these medicines if you haven't already. Please contact your care team if you have questions.     albuterol 108 (90 Base) MCG/ACT inhaler   Commonly known as:  PROAIR HFA/PROVENTIL HFA/VENTOLIN HFA   Stopped by:  Myron Bob MD                Where to get your medicines      These medications were sent to [facility] [address] [address]     Phone:  [phone]     atorvastatin 20 MG tablet    dutasteride 0.5 MG capsule                Primary Care Provider Office Phone # Fax #    Myron Bob [phone] [address] [facility] [address]        Equal Access to Services     ASHLEY BABCOCK AH: Nara colin Soradha, waaxda luqadaha, qaybta kaalmada beto, joel acosta. [facility] [phone].    ATENCIÓN: Si  vianey begum, tiene a harris disposición servicios gratuitos de asistencia lingüística. Aileen harris [phone].    We comply with applicable federal civil rights laws and [place] laws. We do not discriminate on the basis of race, color, national origin, age, disability, sex, sexual orientation, or gender identity.            Thank you!     Thank you for choosing [facility]  [facility] your [facility]. Our goal is always to provide you with excellent care. Hearing back from our patients is one way we can continue to improve our services. Please take a few minutes to complete the written survey that you may receive in the mail after your visit with us. Thank you!             Your Updated Medication List - Protect others around you: Learn how to safely use, store and throw away your medicines at www.disposemymeds.org.          This list is accurate as of 10/3/18 12:09 PM.  Always use your most recent med list.                   Brand Name Dispense Instructions for use Diagnosis    amLODIPine 5 MG tablet    NORVASC    180 tablet    TAKE 2 TABLETS DAILY    Essential hypertension with goal blood pressure less than 140/90       ARTIFICIAL TEARS Oint     1 Tube    4 x a day as needed and at bedtime.    Keratitis, Dry eyes       aspirin 325 MG EC tablet      Take 325 mg by mouth daily. 1/2 pill a day.        atorvastatin 20 MG tablet    LIPITOR    90 tablet    Take 1 tablet (20 mg) by mouth At Bedtime    10 year risk of MI or stroke 7.5% or greater       cetirizine 10 MG tablet    zyrTEC     Take 5 mg by mouth 2 times daily Taking 1/2 tablet once daily        dutasteride 0.5 MG capsule    AVODART    90 capsule    Take 1 capsule (0.5 mg) by mouth daily    Benign prostatic hyperplasia with nocturia       FISH OIL      1 capsule.        fluticasone 50 MCG/ACT spray    FLONASE    3 Bottle    Spray 1 spray into both nostrils 2 times daily    Sinus congestion       Glucos-Robert-MSM-Ql-M-XtAn-SeCu Tabs      one daily         guaiFENesin-codeine 100-10 MG/5ML Soln solution    ROBITUSSIN AC    120 mL    Take 5-10 mLs by mouth every 6 hours as needed for cough    Bronchitis, complicated       LORazepam 0.5 MG tablet    ATIVAN    30 tablet    Take 1 tablet (0.5 mg) by mouth daily as needed    Anxiety       meclizine 25 MG tablet    ANTIVERT    30 tablet    Take 1 tablet (25 mg) by mouth 3 times daily as needed    Vertigo       MULTIVITAMIN TABS   OR      1.5 TABLET DAILY        olopatadine 0.1 % ophthalmic solution    PATANOL    15 mL    Place 1 drop into both eyes 2 times daily.    Allergic conjunctivitis       PROSTATE PO      QD        ranitidine 150 MG tablet    ZANTAC    180 tablet    TAKE 1 TABLET TWICE A DAY    Throat pain, Gastroesophageal reflux disease without esophagitis       triamcinolone 0.1 % cream    KENALOG    30 g    Apply sparingly to affected area three times daily for 14 days.    Eczema

## 2018-10-03 NOTE — NURSING NOTE
"Chief Complaint   Patient presents with     Wellness Visit     Health Maintenance     screening, KEDAR       Initial /90  Pulse 107  Temp 99.2  F (37.3  C) (Oral)  Resp 20  Wt 180 lb (81.6 kg)  SpO2 97%  BMI 28.19 kg/m2 Estimated body mass index is 28.19 kg/(m^2) as calculated from the following:    Height as of 5/23/18: 5' 7\" (1.702 m).    Weight as of this encounter: 180 lb (81.6 kg).  Medication Reconciliation: complete  Gianna Alejandro CMA  "

## 2018-10-03 NOTE — PROGRESS NOTES
Injectable Influenza Immunization Documentation    1.  Is the person to be vaccinated sick today?   No    2. Does the person to be vaccinated have an allergy to a component   of the vaccine?   No  Egg Allergy Algorithm Link    3. Has the person to be vaccinated ever had a serious reaction   to influenza vaccine in the past?   No    4. Has the person to be vaccinated ever had Guillain-Barré syndrome?   No    Form completed by Gianna Alejandro cma         Prior to injection verified patient identity using patient's name and date of birth.  Due to injection administration, patient instructed to remain in clinic for 15 minutes  afterwards, and to report any adverse reaction to me immediately.

## 2018-10-04 ASSESSMENT — ANXIETY QUESTIONNAIRES: GAD7 TOTAL SCORE: 2

## 2018-10-22 DIAGNOSIS — R07.0 THROAT PAIN: ICD-10-CM

## 2018-10-22 DIAGNOSIS — K21.9 GASTROESOPHAGEAL REFLUX DISEASE WITHOUT ESOPHAGITIS: ICD-10-CM

## 2018-10-23 ENCOUNTER — TELEPHONE (OUTPATIENT)
Dept: FAMILY MEDICINE | Facility: CLINIC | Age: 73
End: 2018-10-23

## 2018-10-23 DIAGNOSIS — N40.1 BENIGN PROSTATIC HYPERPLASIA WITH NOCTURIA: ICD-10-CM

## 2018-10-23 DIAGNOSIS — R35.1 BENIGN PROSTATIC HYPERPLASIA WITH NOCTURIA: ICD-10-CM

## 2018-10-23 NOTE — TELEPHONE ENCOUNTER
Central Prior Authorization Team   [phone]    PA Initiation    Medication: dutasteride (AVODART) 0.5 MG capsule  Insurance Company: [employer] - Phone [phone] Fax [phone]  Pharmacy Filling the Rx: [facility] - [place], [place] - [address] [place] [address]  Filling Pharmacy Phone: [phone]  Filling Pharmacy Fax:    Start Date: 10/23/2018

## 2018-10-24 NOTE — TELEPHONE ENCOUNTER
Refill request received within 30 days of last office visit with pcp.  Prescription is routed to the provider to please address refill.   Dianne Sawant RN

## 2018-10-26 NOTE — TELEPHONE ENCOUNTER
PRIOR AUTHORIZATION DENIED    Medication: dutasteride (AVODART) 0.5 MG capsule-DENIED    Denial Date: 10/26/2018    Denial Rational: Criteria Not Met. Insurance states that patient must tried/failed finasteride and reasons why patient is unable to tolerate it due to adverse effects or that treatment with finasteride is contraindicated for patient.                Appeal Information:If provider would like to appeal please provide a letter of medical necessity stating why formulary alternatives would not be clinically appropriate for patient and route back to the PA team.

## 2018-10-26 NOTE — TELEPHONE ENCOUNTER
Called Insurance at [phone] for a follow up on PA and insurance states that PA is being reviewed. Insurance will notify within 24 hours on the status of PA by via faxed.

## 2018-10-30 RX ORDER — FINASTERIDE 5 MG/1
5 TABLET, FILM COATED ORAL DAILY
Qty: 90 TABLET | Refills: 3 | Status: SHIPPED | OUTPATIENT
Start: 2018-10-30 | End: 2019-07-15

## 2018-11-02 NOTE — TELEPHONE ENCOUNTER
I spoke to the patient and let him know that an alternative medication was sent to his pharmacy.  Cori Laws,

## 2018-11-09 ENCOUNTER — OFFICE VISIT (OUTPATIENT)
Dept: FAMILY MEDICINE | Facility: CLINIC | Age: 73
End: 2018-11-09
Payer: MEDICARE

## 2018-11-09 VITALS
WEIGHT: 180 LBS | RESPIRATION RATE: 18 BRPM | TEMPERATURE: 97.6 F | OXYGEN SATURATION: 97 % | DIASTOLIC BLOOD PRESSURE: 82 MMHG | BODY MASS INDEX: 28.19 KG/M2 | HEART RATE: 87 BPM | SYSTOLIC BLOOD PRESSURE: 124 MMHG

## 2018-11-09 DIAGNOSIS — K21.9 CHRONIC GERD: Primary | ICD-10-CM

## 2018-11-09 DIAGNOSIS — M89.9 DISORDER OF BONE: ICD-10-CM

## 2018-11-09 PROCEDURE — 99214 OFFICE O/P EST MOD 30 MIN: CPT | Performed by: FAMILY MEDICINE

## 2018-11-09 RX ORDER — RABEPRAZOLE SODIUM 20 MG/1
20 TABLET, DELAYED RELEASE ORAL DAILY
Qty: 30 TABLET | Refills: 2 | Status: SHIPPED | OUTPATIENT
Start: 2018-11-09 | End: 2019-01-08

## 2018-11-09 ASSESSMENT — PAIN SCALES - GENERAL: PAINLEVEL: NO PAIN (0)

## 2018-11-09 NOTE — PATIENT INSTRUCTIONS
Please call our [facility] Scheduling Line at [phone] to schedule your:  Dexa Scan (bone density scan)

## 2018-11-09 NOTE — PROGRESS NOTES
SUBJECTIVE:   Taqueria Bone is a 73 year old male presenting with a chief complaint of a cough.  He reports that things are better with his current treatment and using the wedge pillow in bed and the bid ranitidine    He still has a tickle in his throat all the time.     He gets some regurgitation into his throat.     He occasionally feels abdominal bloating. If he takes some baking soda and belches he feels better.        He is due for his EGD in March 2019 with [facility](ist) regarding barretts     He has been off of the omeprazole for about a year    Nexium did not work at all  Omeprazole did not work as well after 10 years and he had side effects from it.    Dr Flores looked at his throat with a scope multiple years ago and told the patient that he has GERD that was causing his throat problems.         Patient Active Problem List   Diagnosis     Anxiety     Chronic nonallergic rhinitis     CARDIOVASCULAR SCREENING; LDL GOAL LESS THAN 130     Advanced directives, counseling/discussion     Insomnia     Medial epicondylitis     Episodic recurrent vertigo     BPH (benign prostatic hyperplasia)     Seasonal allergic rhinitis     Essential hypertension with goal blood pressure less than 140/90     Chronic low back pain     Right knee pain     Personal history of tobacco use, presenting hazards to health     10 year risk of MI or stroke 7.5% or greater, 20.7% in October 2018     Adenomatous polyp of colon, unspecified part of colon     Gastroesophageal reflux disease without esophagitis     IGT (impaired glucose tolerance)     Benign prostatic hyperplasia with nocturia     Current Outpatient Prescriptions   Medication Sig Dispense Refill     amLODIPine (NORVASC) 5 MG tablet TAKE 2 TABLETS DAILY 180 tablet 0     Artificial Tear Ointment (ARTIFICIAL TEARS) OINT 4 x a day as needed and at bedtime. 1 Tube 0     aspirin 325 MG EC tablet Take 325 mg by mouth daily. 1/2 pill a day.        atorvastatin (LIPITOR)  20 MG tablet Take 1 tablet (20 mg) by mouth At Bedtime 90 tablet 3     cetirizine (ZYRTEC) 10 MG tablet Take 5 mg by mouth 2 times daily Taking 1/2 tablet once daily       FISH OIL 1 capsule.       fluticasone (FLONASE) 50 MCG/ACT spray Spray 1 spray into both nostrils 2 times daily 3 Bottle 3     GLUCOS-AMANDA-MSM-OO-Z-XJPF-SECU PO TABS one daily       LORazepam (ATIVAN) 0.5 MG tablet Take 1 tablet (0.5 mg) by mouth daily as needed 30 tablet 0     meclizine (ANTIVERT) 25 MG tablet Take 1 tablet (25 mg) by mouth 3 times daily as needed 30 tablet 1     MULTIVITAMIN TABS   OR 1 tablet daily       olopatadine (PATANOL) 0.1 % ophthalmic solution Place 1 drop into both eyes 2 times daily. 15 mL 1     PROSTATE PO QD       ranitidine (ZANTAC) 150 MG tablet TAKE 1 TABLET TWICE A  tablet 3     triamcinolone (KENALOG) 0.1 % cream Apply sparingly to affected area three times daily for 14 days. 30 g 1     finasteride (PROSCAR) 5 MG tablet Take 1 tablet (5 mg) by mouth daily (Patient not taking: Reported on 11/9/2018) 90 tablet 3     Social History   Substance Use Topics     Smoking status: Former Smoker     Quit date: 10/31/1979     Smokeless tobacco: Former User      Comment: 1979     Alcohol use 0.5 oz/week     1 Cans of beer per week      Comment: 1-2 beer daily           OBJECTIVE  :/82  Pulse 87  Temp 97.6  F (36.4  C) (Oral)  Resp 18  Wt 180 lb (81.6 kg)  SpO2 97%  BMI 28.19 kg/m2  GENERAL APPEARANCE: healthy, alert and no distress  EYES: EOMI,  PERRL, conjunctiva clear  HENT: ear canals and TM's normal.  Nose and mouth without ulcers, erythema or lesions  NECK: supple, nontender, no lymphadenopathy  RESP: lungs clear to auscultation - no rales, rhonchi or wheezes  CV: regular rates and rhythm, normal S1 S2, no murmur noted  ABDOMEN:  soft, nontender, no HSM or masses and bowel sounds normal  NEURO: Normal strength and tone, sensory exam grossly normal,  normal speech and mentation  SKIN: no suspicious  lesions or rashes    ASSESSMENT:  GERD and LRD and possible(katerina) ALLERGIC RHINITIS causing some post nasal drip     PLAN:  We discussed options of continue(s) with his current regimen versus replacing the ranitidine with a PPI like Aciphex versus seeing Dr Flores again to evaluate his throat with nasopharyngoscope.     We will start aciphex, discontinue the ranitidine and follow up with me in 2 month(s)

## 2018-11-09 NOTE — NURSING NOTE
"Chief Complaint   Patient presents with     Cough     follow up     Health Maintenance     fall risk     Gastrophageal Reflux       Initial /87  Pulse 87  Temp 97.6  F (36.4  C) (Oral)  Resp 18  Wt 180 lb (81.6 kg)  SpO2 97%  BMI 28.19 kg/m2 Estimated body mass index is 28.19 kg/(m^2) as calculated from the following:    Height as of 5/23/18: 5' 7\" (1.702 m).    Weight as of this encounter: 180 lb (81.6 kg).  Medication Reconciliation: complete  Gianna Alejandro, HANK  "

## 2018-11-09 NOTE — MR AVS SNAPSHOT
After Visit Summary   11/9/2018    Taqueria Bone    MRN: [id number]           Patient Information     Date Of Birth          1945        Visit Information        Provider Department      11/9/2018 3:00 PM Myron Bob MD [facility]        Today's Diagnoses     Chronic GERD    -  1    Disorder of bone           Care Instructions    Please call our [facility] Scheduling Line at [phone] to schedule your:  Dexa Scan (bone density scan)                    Follow-ups after your visit        Follow-up notes from your care team     Return in about 2 months (around 1/9/2019) for recheck on Reflux and go over bone density scan.      Future tests that were ordered for you today     Open Future Orders        Priority Expected Expires Ordered    DX Hip/Pelvis/Spine Routine  3/9/2019 11/9/2018            Who to contact     If you have questions or need follow up information about today's clinic visit or your schedule please contact [facility] directly at [phone].  Normal or non-critical lab and imaging results will be communicated to you by [employer], letter or phone within 4 business days after the clinic has received the results. If you do not hear from us within 7 days, please contact the clinic through [employer] or phone. If you have a critical or abnormal lab result, we will notify you by phone as soon as possible.  Submit refill requests through [employer] or call your pharmacy and they will forward the refill request to us. Please allow 3 business days for your refill to be completed.          Additional Information About Your Visit        MyChart Information     [employer] gives you secure access to your electronic health record. If you see a primary care provider, you can also send messages to your care team and make appointments. If you have questions, please call your primary care clinic.  If you do not have a primary care provider, please call  [phone] and they will assist you.        Care EveryWhere ID     This is your Care EveryWhere ID. This could be used by other organizations to access your [place] medical records  [id number]        Your Vitals Were     Pulse Temperature Respirations Pulse Oximetry BMI (Body Mass Index)       87 97.6  F (36.4  C) (Oral) 18 97% 28.19 kg/m2        Blood Pressure from Last 3 Encounters:   11/09/18 124/82   10/03/18 122/78   08/27/18 142/88    Weight from Last 3 Encounters:   11/09/18 180 lb (81.6 kg)   10/03/18 180 lb (81.6 kg)   08/27/18 179 lb (81.2 kg)                 Today's Medication Changes          These changes are accurate as of 11/9/18  4:01 PM.  If you have any questions, ask your nurse or doctor.               Start taking these medicines.        Dose/Directions    RABEprazole 20 MG EC tablet   Commonly known as:  ACIPHEX   Used for:  Chronic GERD   Started by:  Myron Bob MD        Dose:  20 mg   Take 1 tablet (20 mg) by mouth daily   Quantity:  30 tablet   Refills:  2         Stop taking these medicines if you haven't already. Please contact your care team if you have questions.     dutasteride 0.5 MG capsule   Commonly known as:  AVODART   Stopped by:  Myron Bob MD           guaiFENesin-codeine 100-10 MG/5ML Soln solution   Commonly known as:  ROBITUSSIN AC   Stopped by:  Myron Bob MD                Where to get your medicines      These medications were sent to [facility] [address] [address]     Phone:  [phone]     RABEprazole 20 MG EC tablet                Primary Care Provider Office Phone # Fax #    Myron Bob [phone] [address] [address] [facility] [address]        Equal Access to Services     [facility] SADIQ AH: Nara prater hadasho Soradha, waaxda luqadaha, qaybta kaalmada adeegyada, joel pineda . [facility]  [phone].    ATENCIÓN: Si vianey begum, tiene a harris disposición servicios gratuitos de asistencia lingüística. Aileen harris [phone].    We comply with applicable federal civil rights laws and [place] laws. We do not discriminate on the basis of race, color, national origin, age, disability, sex, sexual orientation, or gender identity.            Thank you!     [facility] you for [facility]  [facility]. Our goal is always to provide you with excellent care. Hearing back from our patients is one way we can continue to improve our services. Please take a few minutes to complete the written survey that you may receive in the mail after your visit with us. Thank you!             Your Updated Medication List - Protect others around you: Learn how to safely use, store and throw away your medicines at www.disposemymeds.org.          This list is accurate as of 11/9/18  4:01 PM.  Always use your most recent med list.                   Brand Name Dispense Instructions for use Diagnosis    amLODIPine 5 MG tablet    NORVASC    180 tablet    TAKE 2 TABLETS DAILY    Essential hypertension with goal blood pressure less than 140/90       ARTIFICIAL TEARS Oint     1 Tube    4 x a day as needed and at bedtime.    Keratitis, Dry eyes       aspirin 325 MG EC tablet      Take 325 mg by mouth daily. 1/2 pill a day.        atorvastatin 20 MG tablet    LIPITOR    90 tablet    Take 1 tablet (20 mg) by mouth At Bedtime    10 year risk of MI or stroke 7.5% or greater       cetirizine 10 MG tablet    zyrTEC     Take 5 mg by mouth 2 times daily Taking 1/2 tablet once daily        finasteride 5 MG tablet    PROSCAR    90 tablet    Take 1 tablet (5 mg) by mouth daily    Benign prostatic hyperplasia with nocturia       FISH OIL      1 capsule.        fluticasone 50 MCG/ACT spray    FLONASE    3 Bottle    Spray 1 spray into both nostrils 2 times daily    Sinus congestion       Glucos-Robert-MSM-Jb-Z-OnLa-SeCu Tabs      one  daily        LORazepam 0.5 MG tablet    ATIVAN    30 tablet    Take 1 tablet (0.5 mg) by mouth daily as needed    Anxiety       meclizine 25 MG tablet    ANTIVERT    30 tablet    Take 1 tablet (25 mg) by mouth 3 times daily as needed    Vertigo       MULTIVITAMIN TABS   OR      1 tablet daily        olopatadine 0.1 % ophthalmic solution    PATANOL    15 mL    Place 1 drop into both eyes 2 times daily.    Allergic conjunctivitis       PROSTATE PO      QD        RABEprazole 20 MG EC tablet    ACIPHEX    30 tablet    Take 1 tablet (20 mg) by mouth daily    Chronic GERD       ranitidine 150 MG tablet    ZANTAC    180 tablet    TAKE 1 TABLET TWICE A DAY    Throat pain, Gastroesophageal reflux disease without esophagitis       triamcinolone 0.1 % cream    KENALOG    30 g    Apply sparingly to affected area three times daily for 14 days.    Eczema

## 2018-11-26 ENCOUNTER — OFFICE VISIT (OUTPATIENT)
Dept: OPHTHALMOLOGY | Facility: CLINIC | Age: 73
End: 2018-11-26
Payer: MEDICARE

## 2018-11-26 DIAGNOSIS — Z96.1 PSEUDOPHAKIA: Primary | ICD-10-CM

## 2018-11-26 DIAGNOSIS — H35.52 MACULAR PIGMENT DEPOSIT: ICD-10-CM

## 2018-11-26 DIAGNOSIS — H40.003 GLAUCOMA SUSPECT OF BOTH EYES: ICD-10-CM

## 2018-11-26 PROCEDURE — 92002 INTRM OPH EXAM NEW PATIENT: CPT | Performed by: OPHTHALMOLOGY

## 2018-11-26 ASSESSMENT — EXTERNAL EXAM - RIGHT EYE: OD_EXAM: NORMAL

## 2018-11-26 ASSESSMENT — SLIT LAMP EXAM - LIDS
COMMENTS: NORMAL
COMMENTS: NORMAL

## 2018-11-26 ASSESSMENT — TONOMETRY
OD_IOP_MMHG: 16
IOP_METHOD: APPLANATION

## 2018-11-26 ASSESSMENT — VISUAL ACUITY
METHOD: SNELLEN - LINEAR
OS_SC+: -2
OD_SC: 20/25
OS_SC: 20/30
OD_SC+: -2

## 2018-11-26 ASSESSMENT — EXTERNAL EXAM - LEFT EYE: OS_EXAM: NORMAL

## 2018-11-26 ASSESSMENT — CUP TO DISC RATIO: OD_RATIO: 0.5

## 2018-11-26 NOTE — MR AVS SNAPSHOT
After Visit Summary   11/26/2018    Taqueria Bone    MRN: [id number]           Patient Information     Date Of Birth          1945        Visit Information        Provider Department      11/26/2018 11:00 AM Ervin Honeycutt MD [facility]        Care Instructions    No laser needed at this time.  Continue observation with regard to glaucoma suspect status.  Return visit 6 months for refraction, intraocular pressure check, glaucoma OCT, retinal OCT, pachy, and Gilbert Visual Field.  Ervin Honeycutt M.D.  [phone]             Follow-ups after your visit        Your next 10 appointments already scheduled     Dec 03, 2018  9:40 AM CST   Nurse Only with FZ ANCILLARY   [facility] ([facility])    [address] [address] [phone]            Dec 03, 2018 10:30 AM CST   DX HIP/PELVIS/SPINE with FKDX1   [facility] ([facility])    [address] [address] [phone]           How do I prepare for my exam? (Food and drink instructions) No Food and Drink Restrictions.  How do I prepare for my exam? (Other instructions) Please do not take any of the following 24 hours prior to the day of your exam: vitamins, calcium tablets, antacids.  What should I wear: If possible, please wear clothes without metal (snaps, zippers). A sweat suit works well.  How long does the exam take: The exam takes about 20 minutes.  What should I bring: Bring a list of your current medicines to your exam (including vitamins, minerals and over-the-counter drugs).  Do I need a :  No  is needed.  What should I do after the exam: No restrictions, You may resume normal activities.  How do I prepare for my exam? (Food and drink instructions) A DEXA scan is a bone-density scan. It uses a low level of radiation to check the strength of your bones. As you lie on a padded table, a machine will take  X-rays. We most often scan the hips and lower spine.  Who should I call with questions: If you have any questions, please call the Imaging Department where you will have your exam. Directions, parking instructions, and other information is available on our website, [email or web address].              Who to contact     If you have questions or need follow up information about today's clinic visit or your schedule please contact [facility] directly at [phone].  Normal or non-critical lab and imaging results will be communicated to you by [employer], letter or phone within 4 business days after the clinic has received the results. If you do not hear from us within 7 days, please contact the clinic through [employer] or phone. If you have a critical or abnormal lab result, we will notify you by phone as soon as possible.  Submit refill requests through [employer] or call your pharmacy and they will forward the refill request to us. Please allow 3 business days for your refill to be completed.          Additional Information About Your Visit        [employer] Information     [employer] gives you secure access to your electronic health record. If you see a primary care provider, you can also send messages to your care team and make appointments. If you have questions, please call your primary care clinic.  If you do not have a primary care provider, please call [phone] and they will assist you.        Care EveryWhere ID     This is your Care EveryWhere ID. This could be used by other organizations to access your [facility] medical records  [id number]         Blood Pressure from Last 3 Encounters:   11/09/18 124/82   10/03/18 122/78   08/27/18 142/88    Weight from Last 3 Encounters:   11/09/18 81.6 kg (180 lb)   10/03/18 81.6 kg (180 lb)   08/27/18 81.2 kg (179 lb)              Today, you had the following     No orders found for display       Primary Care Provider Office Phone # Fax #    Myron Bob MD  [phone]       [address] [facility] [address]        Equal Access to Services     ASHLEY BABCOCK : Hadprem triston prater dixie Lu, wasamirada luqadaha, qaybta kaalmada beto, joel monten[place] laRupinderwilliam acosta. So [facility] [phone].    ATENCIÓN: Si habla español, tiene a harris disposición servicios gratuitos de asistencia lingüística. Dejaame al [phone].    We comply with applicable federal civil rights laws and [place] laws. We do not discriminate on the basis of race, color, national origin, age, disability, sex, sexual orientation, or gender identity.            Thank you!     Thank you for choosing [facility] [place]  for your care. Our goal is always to provide you with excellent care. Hearing back from our patients is one way we can continue to improve our services. Please take a few minutes to complete the written survey that you may receive in the mail after your visit with us. Thank you!             Your Updated Medication List - Protect others around you: Learn how to safely use, store and throw away your medicines at www.disposemymeds.org.          This list is accurate as of 11/26/18 12:12 PM.  Always use your most recent med list.                   Brand Name Dispense Instructions for use Diagnosis    amLODIPine 5 MG tablet    NORVASC    180 tablet    TAKE 2 TABLETS DAILY    Essential hypertension with goal blood pressure less than 140/90       ARTIFICIAL TEARS Oint     1 Tube    4 x a day as needed and at bedtime.    Keratitis, Dry eyes       aspirin 325 MG EC tablet    ASA     Take 325 mg by mouth daily. 1/2 pill a day.        atorvastatin 20 MG tablet    LIPITOR    90 tablet    Take 1 tablet (20 mg) by mouth At Bedtime    10 year risk of MI or stroke 7.5% or greater       cetirizine 10 MG tablet    zyrTEC     Take 5 mg by mouth 2 times daily Taking 1/2 tablet once daily        finasteride 5 MG tablet    PROSCAR    90 tablet    Take 1 tablet (5 mg) by mouth daily     Benign prostatic hyperplasia with nocturia       FISH OIL      1 capsule.        fluticasone 50 MCG/ACT spray    FLONASE    3 Bottle    Spray 1 spray into both nostrils 2 times daily    Sinus congestion       Glucos-Robert-MSM-Ch-F-MxIc-SeCu Tabs      one daily        LORazepam 0.5 MG tablet    ATIVAN    30 tablet    Take 1 tablet (0.5 mg) by mouth daily as needed    Anxiety       meclizine 25 MG tablet    ANTIVERT    30 tablet    Take 1 tablet (25 mg) by mouth 3 times daily as needed    Vertigo       MULTIVITAMIN TABS   OR      1 tablet daily        PROSTATE PO      QD        RABEprazole 20 MG EC tablet    ACIPHEX    30 tablet    Take 1 tablet (20 mg) by mouth daily    Chronic GERD       ranitidine 150 MG tablet    ZANTAC    180 tablet    TAKE 1 TABLET TWICE A DAY    Throat pain, Gastroesophageal reflux disease without esophagitis       triamcinolone 0.1 % cream    KENALOG    30 g    Apply sparingly to affected area three times daily for 14 days.    Eczema

## 2018-11-26 NOTE — PROGRESS NOTES
Current Eye Medications: Systane as needed both eyes, every few days, Hot compresses in the am.      Subjective: here for Blurred vision right eye, started last year.  Dr. Graham looked at it and said he had a PVD right eye in the past.  Was nearsighted before cataract surgery in both eyes. S/P YAG CAP left eye in 2010.  Wondering if he needs a YAG right eye.      Had cataract surgeries with STS.  Father had glaucoma.  Interested in new glasses.     Objective:  See Ophthalmology Exam.       Assessment:  No significant posterior capsule opacity right eye at present.  Disc right eye somewhat suspicious, hx of follow as glaucoma suspect, family hx of glaucoma.  Mild macular pigmentary changes.      Plan:  No laser needed at this time.  Continue observation with regard to glaucoma suspect status.  Return visit 6 months for refraction, intraocular pressure check, glaucoma OCT, retinal OCT, pachy, and Gilbert Visual Field.  Plan dilated exam 6 months thereafter.  Ervin Honeycutt M.D.  [phone]

## 2018-11-26 NOTE — LETTER
11/26/2018         RE: Taqueria Bone  [address]  [place] [address]        Dear Colleague,    Thank you for referring your patient, Taqueria Bone, to the [facility]. Please see a copy of my visit note below.     Current Eye Medications: Systane as needed both eyes, every few days, Hot compresses in the am.      Subjective: here for Blurred vision right eye, started last year.  Dr. Graham looked at it and said he had a PVD right eye in the past.  Was nearsighted before cataract surgery in both eyes. S/P YAG CAP left eye in 2010.  Wondering if he needs a YAG right eye.      Had cataract surgeries with STS.  Father had glaucoma.  Interested in new glasses.     Objective:  See Ophthalmology Exam.       Assessment:  No significant posterior capsule opacity right eye at present.  Disc right eye somewhat suspicious, hx of follow as glaucoma suspect, family hx of glaucoma.  Mild macular pigmentary changes.      Plan:  No laser needed at this time.  Continue observation with regard to glaucoma suspect status.  Return visit 6 months for refraction, intraocular pressure check, glaucoma OCT, retinal OCT, pachy, and Gilbert Visual Field.  Plan dilated exam 6 months thereafter.  Ervin Honeycutt M.D.  [phone]            Again, thank you for allowing me to participate in the care of your patient.        Sincerely,        Ervin Honeycutt MD

## 2018-12-03 ENCOUNTER — ALLIED HEALTH/NURSE VISIT (OUTPATIENT)
Dept: NURSING | Facility: CLINIC | Age: 73
End: 2018-12-03
Payer: MEDICARE

## 2018-12-03 ENCOUNTER — RADIANT APPOINTMENT (OUTPATIENT)
Dept: BONE DENSITY | Facility: CLINIC | Age: 73
End: 2018-12-03
Attending: FAMILY MEDICINE
Payer: MEDICARE

## 2018-12-03 DIAGNOSIS — Z00.00 PREVENTATIVE HEALTH CARE: Primary | ICD-10-CM

## 2018-12-03 DIAGNOSIS — M89.9 DISORDER OF BONE: ICD-10-CM

## 2018-12-03 DIAGNOSIS — K21.9 CHRONIC GERD: ICD-10-CM

## 2018-12-03 PROCEDURE — 90750 HZV VACC RECOMBINANT IM: CPT

## 2018-12-03 PROCEDURE — 99207 ZZC NO CHARGE LOS: CPT

## 2018-12-03 PROCEDURE — 90471 IMMUNIZATION ADMIN: CPT

## 2018-12-03 PROCEDURE — 77080 DXA BONE DENSITY AXIAL: CPT | Performed by: INTERNAL MEDICINE

## 2018-12-03 NOTE — MR AVS SNAPSHOT
After Visit Summary   12/3/2018    Taqueria Bone    MRN: [id number]           Patient Information     Date Of Birth          1945        Visit Information        Provider Department      12/3/2018 9:40 AM FZ ANCILLARY [facility] [place]        Today's Diagnoses     Preventative health care    -  1       Follow-ups after your visit        Your next 10 appointments already scheduled     Dec 03, 2018 10:30 AM CST   DX HIP/PELVIS/SPINE with FKDX1   [facility] [place] ([facility] [place])    [address] [facility] [address] [phone]           How do I prepare for my exam? (Food and drink instructions) No Food and Drink Restrictions.  How do I prepare for my exam? (Other instructions) Please do not take any of the following 24 hours prior to the day of your exam: vitamins, calcium tablets, antacids.  What should I wear: If possible, please wear clothes without metal (snaps, zippers). A sweat suit works well.  How long does the exam take: The exam takes about 20 minutes.  What should I bring: Bring a list of your current medicines to your exam (including vitamins, minerals and over-the-counter drugs).  Do I need a :  No  is needed.  What should I do after the exam: No restrictions, You may resume normal activities.  How do I prepare for my exam? (Food and drink instructions) A DEXA scan is a bone-density scan. It uses a low level of radiation to check the strength of your bones. As you lie on a padded table, a machine will take X-rays. We most often scan the hips and lower spine.  Who should I call with questions: If you have any questions, please call the Imaging Department where you will have your exam. Directions, parking instructions, and other information is available on our website, [email or web address].            May 14, 2019  3:15 PM CDT   Return Visit with Ervin Honeycutt MD   [facility]    [address]  [place] [address] [phone]              Who to contact     If you have questions or need follow up information about today's clinic visit or your schedule please contact [facility] directly at [phone].  Normal or non-critical lab and imaging results will be communicated to you by MyChart, letter or phone within 4 business days after the clinic has received the results. If you do not hear from us within 7 days, please contact the clinic through MyChart or phone. If you have a critical or abnormal lab result, we will notify you by phone as soon as possible.  Submit refill requests through [employer] or call your pharmacy and they will forward the refill request to us. Please allow 3 business days for your refill to be completed.          Additional Information About Your Visit        MyChart Information     [employer] gives you secure access to your electronic health record. If you see a primary care provider, you can also send messages to your care team and make appointments. If you have questions, please call your primary care clinic.  If you do not have a primary care provider, please call [phone] and they will assist you.        Care EveryWhere ID     This is your Care EveryWhere ID. This could be used by other organizations to access your [facility] medical records  [id number]         Blood Pressure from Last 3 Encounters:   11/09/18 124/82   10/03/18 122/78   08/27/18 142/88    Weight from Last 3 Encounters:   11/09/18 180 lb (81.6 kg)   10/03/18 180 lb (81.6 kg)   08/27/18 179 lb (81.2 kg)              We Performed the Following     ADMIN 1st VACCINE     ZOSTER VACCINE RECOMBINANT ADJUVANTED IM NJX        Primary Care Provider Office Phone # Fax #    Myron Bob [phone] [address] [facility] [address]        Equal Access to Services     ASHLEY BABCOCK : Nara Lu, estefany ramachandran, velia pérez,  joel cliffordwilliam loza'aan ah. So [facility] [phone].    ATENCIÓN: Si habla kel, tiene a harris disposición servicios gratuitos de asistencia lingüística. Aileen harris [phone].    We comply with applicable federal civil rights laws and [place] laws. We do not discriminate on the basis of race, color, national origin, age, disability, sex, sexual orientation, or gender identity.            Thank you!     Thank you for choosing [facility]  for your care. Our goal is always to provide you with excellent care. Hearing back from our patients is one way we can continue to improve our services. Please take a few minutes to complete the written survey that you may receive in the mail after your visit with us. Thank you!             Your Updated Medication List - Protect others around you: Learn how to safely use, store and throw away your medicines at www.disposemymeds.org.          This list is accurate as of 12/3/18  9:54 AM.  Always use your most recent med list.                   Brand Name Dispense Instructions for use Diagnosis    amLODIPine 5 MG tablet    NORVASC    180 tablet    TAKE 2 TABLETS DAILY    Essential hypertension with goal blood pressure less than 140/90       ARTIFICIAL TEARS Oint     1 Tube    4 x a day as needed and at bedtime.    Keratitis, Dry eyes       aspirin 325 MG EC tablet    ASA     Take 325 mg by mouth daily. 1/2 pill a day.        atorvastatin 20 MG tablet    LIPITOR    90 tablet    Take 1 tablet (20 mg) by mouth At Bedtime    10 year risk of MI or stroke 7.5% or greater       cetirizine 10 MG tablet    zyrTEC     Take 5 mg by mouth 2 times daily Taking 1/2 tablet once daily        finasteride 5 MG tablet    PROSCAR    90 tablet    Take 1 tablet (5 mg) by mouth daily    Benign prostatic hyperplasia with nocturia       FISH OIL      1 capsule.        fluticasone 50 MCG/ACT nasal spray    FLONASE    3 Bottle    Spray 1 spray into both nostrils 2 times daily     Sinus congestion       Glucos-Robert-MSM-Bw-X-AeRn-SeCu Tabs      one daily        LORazepam 0.5 MG tablet    ATIVAN    30 tablet    Take 1 tablet (0.5 mg) by mouth daily as needed    Anxiety       meclizine 25 MG tablet    ANTIVERT    30 tablet    Take 1 tablet (25 mg) by mouth 3 times daily as needed    Vertigo       MULTIVITAMIN TABS   OR      1 tablet daily        PROSTATE PO      QD        RABEprazole 20 MG EC tablet    ACIPHEX    30 tablet    Take 1 tablet (20 mg) by mouth daily    Chronic GERD       ranitidine 150 MG tablet    ZANTAC    180 tablet    TAKE 1 TABLET TWICE A DAY    Throat pain, Gastroesophageal reflux disease without esophagitis       triamcinolone 0.1 % external cream    KENALOG    30 g    Apply sparingly to affected area three times daily for 14 days.    Eczema

## 2018-12-03 NOTE — NURSING NOTE
Prior to injection verified patient identity using patient's name and date of birth.  Due to injection administration, patient instructed to remain in clinic for 15 minutes  afterwards, and to report any adverse reaction to me immediately.    Screening Questionnaire for Adult Immunization    Are you sick today?   No   Do you have allergies to medications, food, a vaccine component or latex?   No   Have you ever had a serious reaction after receiving a vaccination?   No   Do you have a long-term health problem with heart disease, lung disease, asthma, kidney disease, metabolic disease (e.g. diabetes), anemia, or other blood disorder?   No   Do you have cancer, leukemia, HIV/AIDS, or any other immune system problem?   No   In the past 3 months, have you taken medications that affect  your immune system, such as prednisone, other steroids, or anticancer drugs; drugs for the treatment of rheumatoid arthritis, Crohn s disease, or psoriasis; or have you had radiation treatments?   No   Have you had a seizure, or a brain or other nervous system problem?   No   During the past year, have you received a transfusion of blood or blood     products, or been given immune (gamma) globulin or antiviral drug?   No   For women: Are you pregnant or is there a chance you could become        pregnant during the next month?   No   Have you received any vaccinations in the past 4 weeks?   No     Immunization questionnaire answers were all negative.        Per orders of Dr. Bob, injection of Shingrix given by Adriana Zhou. Patient instructed to remain in clinic for 15 minutes afterwards, and to report any adverse reaction to me immediately.       Screening performed by Adriana Zhou on 12/3/2018 at 9:53 AM.

## 2018-12-31 ENCOUNTER — OFFICE VISIT (OUTPATIENT)
Dept: FAMILY MEDICINE | Facility: CLINIC | Age: 73
End: 2018-12-31
Payer: MEDICARE

## 2018-12-31 VITALS
HEART RATE: 94 BPM | WEIGHT: 182 LBS | SYSTOLIC BLOOD PRESSURE: 144 MMHG | BODY MASS INDEX: 27.58 KG/M2 | TEMPERATURE: 99.3 F | HEIGHT: 68 IN | OXYGEN SATURATION: 99 % | DIASTOLIC BLOOD PRESSURE: 90 MMHG

## 2018-12-31 DIAGNOSIS — J22 LOWER RESPIRATORY INFECTION: Primary | ICD-10-CM

## 2018-12-31 PROCEDURE — 99213 OFFICE O/P EST LOW 20 MIN: CPT | Performed by: PHYSICIAN ASSISTANT

## 2018-12-31 RX ORDER — BENZONATATE 200 MG/1
200 CAPSULE ORAL 3 TIMES DAILY PRN
Qty: 30 CAPSULE | Refills: 0 | Status: SHIPPED | OUTPATIENT
Start: 2018-12-31 | End: 2019-01-10

## 2018-12-31 RX ORDER — DOXYCYCLINE 100 MG/1
100 CAPSULE ORAL 2 TIMES DAILY WITH MEALS
Qty: 20 CAPSULE | Refills: 1 | Status: SHIPPED | OUTPATIENT
Start: 2018-12-31 | End: 2019-01-10

## 2018-12-31 ASSESSMENT — ENCOUNTER SYMPTOMS
GASTROINTESTINAL NEGATIVE: 1
DIAPHORESIS: 0
SHORTNESS OF BREATH: 0
SPUTUM PRODUCTION: 1
FEVER: 0
COUGH: 1
CARDIOVASCULAR NEGATIVE: 1
HEMOPTYSIS: 0
WEIGHT LOSS: 0
CHILLS: 1
EYE PAIN: 0
WHEEZING: 1
PALPITATIONS: 0

## 2018-12-31 ASSESSMENT — MIFFLIN-ST. JEOR: SCORE: 1550.05

## 2018-12-31 NOTE — PROGRESS NOTES
SUBJECTIVE:     MACRINA Bone is a 73 year old male who presents to clinic today for the following health issues:  RESPIRATORY SYMPTOMS    Duration: 10days    Description  Productive cough, wheezing, chills but no shortness of breath or hemoptysis.    Severity: mild    Accompanying signs and symptoms: No sore throat or sinus congestion/pain/pressure.  No abdominal pain, n/v, constipation, diarrhea, bloody or black tarry stools.  No fever, chills or sweats.     History (predisposing factors):  No ill contacts.  No pmh of asthma.  Non-smoker.    Precipitating or alleviating factors: None    Therapies tried and outcome:  oral decongestant, OTC cough med with minimal relief.    Reviewed PMH, FMH and SOH.  Patient Active Problem List   Diagnosis     Anxiety     Chronic nonallergic rhinitis     CARDIOVASCULAR SCREENING; LDL GOAL LESS THAN 130     Advanced directives, counseling/discussion     Insomnia     Medial epicondylitis     Episodic recurrent vertigo     BPH (benign prostatic hyperplasia)     Seasonal allergic rhinitis     Essential hypertension with goal blood pressure less than 140/90     Chronic low back pain     Right knee pain     Personal history of tobacco use, presenting hazards to health     10 year risk of MI or stroke 7.5% or greater, 20.7% in October 2018     Adenomatous polyp of colon, unspecified part of colon     Gastroesophageal reflux disease without esophagitis     IGT (impaired glucose tolerance)     Benign prostatic hyperplasia with nocturia     Pseudophakia, ou; Yag Caps, os     Glaucoma suspect of both eyes     Macular pigment deposit, od     Current Outpatient Medications   Medication Sig Dispense Refill     amLODIPine (NORVASC) 5 MG tablet TAKE 2 TABLETS DAILY 180 tablet 0     Artificial Tear Ointment (ARTIFICIAL TEARS) OINT 4 x a day as needed and at bedtime. 1 Tube 0     aspirin 325 MG EC tablet Take 325 mg by mouth daily. 1/2 pill a day.        atorvastatin (LIPITOR) 20 MG  "tablet Take 1 tablet (20 mg) by mouth At Bedtime 90 tablet 3     cetirizine (ZYRTEC) 10 MG tablet Take 5 mg by mouth 2 times daily Taking 1/2 tablet once daily       finasteride (PROSCAR) 5 MG tablet Take 1 tablet (5 mg) by mouth daily (Patient not taking: Reported on 11/9/2018) 90 tablet 3     FISH OIL 1 capsule.       fluticasone (FLONASE) 50 MCG/ACT spray Spray 1 spray into both nostrils 2 times daily 3 Bottle 3     GLUCOS-AMANDA-MSM-PH-R-LZIZ-SECU PO TABS one daily       LORazepam (ATIVAN) 0.5 MG tablet Take 1 tablet (0.5 mg) by mouth daily as needed 30 tablet 0     meclizine (ANTIVERT) 25 MG tablet Take 1 tablet (25 mg) by mouth 3 times daily as needed 30 tablet 1     MULTIVITAMIN TABS   OR 1 tablet daily       PROSTATE PO QD       RABEprazole (ACIPHEX) 20 MG EC tablet Take 1 tablet (20 mg) by mouth daily 30 tablet 2     ranitidine (ZANTAC) 150 MG tablet TAKE 1 TABLET TWICE A  tablet 3     triamcinolone (KENALOG) 0.1 % cream Apply sparingly to affected area three times daily for 14 days. 30 g 1     Allergies   Allergen Reactions     Azithromycin GI Disturbance     High blood pressure     Lactose      Except yogurt    Patient gets stomach cramps for days after eating lactose       Levaquin      DIZZY SPELLS  INCREASED BLOOD PRESSURE     Omeprazole      Felt foggy     Penicillins Hives     Xylocaine [Lidocaine Hcl]        Review of Systems   Constitutional: Positive for chills. Negative for diaphoresis, fever and weight loss.   HENT: Negative.  Negative for congestion and ear pain.    Eyes: Negative for pain.   Respiratory: Positive for cough, sputum production and wheezing. Negative for hemoptysis and shortness of breath.    Cardiovascular: Negative.  Negative for chest pain and palpitations.   Gastrointestinal: Negative.    Skin: Negative.    All other systems reviewed and are negative.      /90   Pulse 94   Temp 99.3  F (37.4  C) (Oral)   Ht 1.727 m (5' 8\")   Wt 82.6 kg (182 lb)   SpO2 99%   " BMI 27.67 kg/m    Physical Exam   Constitutional: He is oriented to person, place, and time. He appears well-developed and well-nourished. No distress.   HENT:   Head: Normocephalic and atraumatic.   Nose: Nose normal.   Mouth/Throat: Uvula is midline, oropharynx is clear and moist and mucous membranes are normal. No oropharyngeal exudate or posterior oropharyngeal erythema.   TMs are intact without any erythema or bulging bilaterally.  Airway is patent.   Eyes: Conjunctivae and EOM are normal. Pupils are equal, round, and reactive to light. No scleral icterus.   Neck: Normal range of motion. Neck supple. No thyromegaly present.   Cardiovascular: Normal rate, regular rhythm, normal heart sounds and intact distal pulses. Exam reveals no gallop and no friction rub.   No murmur heard.  Pulmonary/Chest: Effort normal and breath sounds normal. No accessory muscle usage. No respiratory distress. He has no decreased breath sounds. He has no wheezes. He has no rhonchi. He has no rales.   Lymphadenopathy:     He has no cervical adenopathy.   Neurological: He is alert and oriented to person, place, and time.   Skin: Skin is warm, dry and intact. No cyanosis. Nails show no clubbing.   Psychiatric: He has a normal mood and affect. His behavior is normal.   Nursing note and vitals reviewed.        Assessment/Plan:  Lower respiratory infection:  Declined CXR.  Will treat with [id number], tessalon perles as needed for cough.  Discussed risks and benefits of medication along with side effects, direction for use, and photosensitivity.  Recommend treatment with rest, fluids and chicken soup. Tylenol/ibuprofen prn fever/pain.  Recheck in clinic if symptoms worsen or if symptoms do not improve.  To the ER if he develops hemoptysis, chest pain, fevers>102, worsening shortness of breath/wheezing.    -     doxycycline monohydrate (MONODOX) 100 MG capsule; Take 1 capsule (100 mg) by mouth 2 times daily (with meals) for 10 days Increases  risk of heartburn and also sun sensitivity or sun burn.  -     benzonatate (TESSALON) 200 MG capsule; Take 1 capsule (200 mg) by mouth 3 times daily as needed for cough          Toshia See SAMANTHA Castillo

## 2019-01-08 ENCOUNTER — OFFICE VISIT (OUTPATIENT)
Dept: FAMILY MEDICINE | Facility: CLINIC | Age: 74
End: 2019-01-08
Payer: MEDICARE

## 2019-01-08 VITALS
BODY MASS INDEX: 28.34 KG/M2 | RESPIRATION RATE: 20 BRPM | OXYGEN SATURATION: 98 % | HEART RATE: 91 BPM | TEMPERATURE: 97.3 F | SYSTOLIC BLOOD PRESSURE: 126 MMHG | DIASTOLIC BLOOD PRESSURE: 85 MMHG | HEIGHT: 68 IN | WEIGHT: 187 LBS

## 2019-01-08 DIAGNOSIS — M81.0 OSTEOPOROSIS WITHOUT CURRENT PATHOLOGICAL FRACTURE, UNSPECIFIED OSTEOPOROSIS TYPE: Primary | ICD-10-CM

## 2019-01-08 DIAGNOSIS — R07.0 THROAT PAIN: ICD-10-CM

## 2019-01-08 DIAGNOSIS — K21.9 GASTROESOPHAGEAL REFLUX DISEASE WITHOUT ESOPHAGITIS: ICD-10-CM

## 2019-01-08 PROCEDURE — 99214 OFFICE O/P EST MOD 30 MIN: CPT | Performed by: FAMILY MEDICINE

## 2019-01-08 PROCEDURE — 36415 COLL VENOUS BLD VENIPUNCTURE: CPT | Performed by: FAMILY MEDICINE

## 2019-01-08 PROCEDURE — 82306 VITAMIN D 25 HYDROXY: CPT | Performed by: FAMILY MEDICINE

## 2019-01-08 ASSESSMENT — MIFFLIN-ST. JEOR: SCORE: 1572.73

## 2019-01-08 ASSESSMENT — PAIN SCALES - GENERAL: PAINLEVEL: NO PAIN (0)

## 2019-01-08 NOTE — PROGRESS NOTES
SUBJECTIVE:  HPI: Taqueria Bone is a 73 year old male who presents for follow up on his last visit 2 month(s) ago. The patient was started on 20 mg of aciphex. The patient has been taking the medication as prescribed until a few day(s) ago.The patient denies any side effects from the medication.   He reports that the aciphex was helpful in eliminating his symptom(s) initially.  But after a month(s) or so his symptom(s) returned.  He went back to taking the ranitidine once a day(s)   He takes tums as well as needed.     He used the baking soda as needed.    He is using the wedge pillow.  He usually eats dinner at 7:00 and goes to sleep at midnight       Currently he gets the heartburn symptom(s) daily.  Usually  after breakfast     He is due for a EGD with Dr Castrejon in March     He had his dexa scan and is worried about the aciphex affecting his bone density    Patient Active Problem List 01/08/2019 - Reviewed 01/08/2019   -- AZITHROMYCIN -- GI Disturbance -- noted 12/08/2009   -- LACTOSE --  -- noted 01/18/2012   -- LEVAQUIN --  -- noted 12/08/2009   -- OMEPRAZOLE --  -- noted 11/09/2018   -- PENICILLINS -- Hives -- noted 12/08/2009   -- XYLOCAINE [LIDOCAINE HCL] --  -- noted 02/11/2010      Current Outpatient Medications:      amLODIPine (NORVASC) 5 MG tablet, TAKE 2 TABLETS DAILY, Disp: 180 tablet, Rfl: 0     Artificial Tear Ointment (ARTIFICIAL TEARS) OINT, 4 x a day as needed and at bedtime., Disp: 1 Tube, Rfl: 0     aspirin 325 MG EC tablet, Take 325 mg by mouth daily. 1/2 pill a day. , Disp: , Rfl:      atorvastatin (LIPITOR) 20 MG tablet, Take 1 tablet (20 mg) by mouth At Bedtime, Disp: 90 tablet, Rfl: 3     benzonatate (TESSALON) 200 MG capsule, Take 1 capsule (200 mg) by mouth 3 times daily as needed for cough, Disp: 30 capsule, Rfl: 0     cetirizine (ZYRTEC) 10 MG tablet, Take 5 mg by mouth 2 times daily Taking 1/2 tablet once daily, Disp: , Rfl:      doxycycline monohydrate (MONODOX) 100 MG  capsule, Take 1 capsule (100 mg) by mouth 2 times daily (with meals) for 10 days Increases risk of heartburn and also sun sensitivity or sun burn., Disp: 20 capsule, Rfl: 1     finasteride (PROSCAR) 5 MG tablet, Take 1 tablet (5 mg) by mouth daily, Disp: 90 tablet, Rfl: 3     FISH OIL, 1 capsule., Disp: , Rfl:      fluticasone (FLONASE) 50 MCG/ACT spray, Spray 1 spray into both nostrils 2 times daily, Disp: 3 Bottle, Rfl: 3     GLUCOS-AMANDA-MSM-OH-G-DSLZ-SECU PO TABS, one daily, Disp: , Rfl:      LORazepam (ATIVAN) 0.5 MG tablet, Take 1 tablet (0.5 mg) by mouth daily as needed, Disp: 30 tablet, Rfl: 0     meclizine (ANTIVERT) 25 MG tablet, Take 1 tablet (25 mg) by mouth 3 times daily as needed, Disp: 30 tablet, Rfl: 1     MULTIVITAMIN TABS   OR, 1 tablet daily, Disp: , Rfl:      PROSTATE PO, QD, Disp: , Rfl:      ranitidine (ZANTAC) 150 MG tablet, Take 1 tablet (150 mg) by mouth 2 times daily, Disp: 180 tablet, Rfl: 3     triamcinolone (KENALOG) 0.1 % cream, Apply sparingly to affected area three times daily for 14 days., Disp: 30 g, Rfl: 1  Patient Active Problem List   Diagnosis     Anxiety     Chronic nonallergic rhinitis     CARDIOVASCULAR SCREENING; LDL GOAL LESS THAN 130     Advanced directives, counseling/discussion     Insomnia     Medial epicondylitis     Episodic recurrent vertigo     BPH (benign prostatic hyperplasia)     Seasonal allergic rhinitis     Essential hypertension with goal blood pressure less than 140/90     Chronic low back pain     Right knee pain     Personal history of tobacco use, presenting hazards to health     10 year risk of MI or stroke 7.5% or greater, 20.7% in October 2018     Adenomatous polyp of colon, unspecified part of colon     Gastroesophageal reflux disease without esophagitis     IGT (impaired glucose tolerance)     Benign prostatic hyperplasia with nocturia     Pseudophakia, ou; Yag Caps, os     Glaucoma suspect of both eyes     Macular pigment deposit, od         PHYSICAL  "EXAM:  /85   Pulse 91   Temp 97.3  F (36.3  C) (Oral)   Resp 20   Ht 1.727 m (5' 8\")   Wt 84.8 kg (187 lb)   SpO2 98%   BMI 28.43 kg/m     General: healthy, alert and no distress  HENT: Normocephalic. TM's grossly normal, oropharynx without significant findings.  Neck: supple, without thyromegaly or thyroid nodularity and without cervical or jugular adenopathy  Lungs: Clear to auscultation  Heart:NEGATIVE, PMI normal. No lifts, heaves, or thrills. RRR. No murmurs, clicks gallops or rub  Abdomen: bowel sounds normal, liver span normal to percussion, no masses palpable and soft, non-tender    ASSESSMENT / IMPRESSION:  1)Gastroesophageal Reflux Disease    PLAN:  Continue the current medication.  Continue current life style changes including: eat smaller, more frequent meals, last meal at least 3 hours before bedtime and   Patient Instructions   Make sure to schedule your EGD with Dr Castrejon in March             I did recommend that the patient make sure to get the recommended daily dose of calcium either through dietary sources or through supplements.  We review(ed) his Dexa and we will recheck that in 2 years. Being off of the PPI should help. We will check his vitamin D level today.      "

## 2019-01-09 LAB — DEPRECATED CALCIDIOL+CALCIFEROL SERPL-MC: 33 UG/L (ref 20–75)

## 2019-01-09 NOTE — RESULT ENCOUNTER NOTE
Taqueria,  I have reviewed the results of the laboratory tests that we recently ordered. All of the lab work performed was normal or considered normal for you.  Sincerely,   Myron Bob

## 2019-04-22 ENCOUNTER — TRANSFERRED RECORDS (OUTPATIENT)
Dept: HEALTH INFORMATION MANAGEMENT | Facility: CLINIC | Age: 74
End: 2019-04-22

## 2019-04-25 PROBLEM — K22.70 BARRETT'S ESOPHAGUS WITHOUT DYSPLASIA: Status: ACTIVE | Noted: 2019-04-25

## 2019-04-30 DIAGNOSIS — R09.81 SINUS CONGESTION: ICD-10-CM

## 2019-05-01 RX ORDER — FLUTICASONE PROPIONATE 50 MCG
SPRAY, SUSPENSION (ML) NASAL
Qty: 48 G | Refills: 2 | Status: SHIPPED | OUTPATIENT
Start: 2019-05-01 | End: 2020-01-27

## 2019-06-04 DIAGNOSIS — I10 ESSENTIAL HYPERTENSION WITH GOAL BLOOD PRESSURE LESS THAN 140/90: ICD-10-CM

## 2019-06-05 RX ORDER — AMLODIPINE BESYLATE 5 MG/1
TABLET ORAL
Qty: 180 TABLET | Refills: 0 | Status: SHIPPED | OUTPATIENT
Start: 2019-06-05 | End: 2019-09-03

## 2019-07-02 ENCOUNTER — OFFICE VISIT (OUTPATIENT)
Dept: OPHTHALMOLOGY | Facility: CLINIC | Age: 74
End: 2019-07-02
Payer: MEDICARE

## 2019-07-02 DIAGNOSIS — H52.4 PRESBYOPIA: ICD-10-CM

## 2019-07-02 DIAGNOSIS — H35.52 MACULAR PIGMENT DEPOSIT: ICD-10-CM

## 2019-07-02 DIAGNOSIS — H40.003 GLAUCOMA SUSPECT OF BOTH EYES: Primary | ICD-10-CM

## 2019-07-02 PROCEDURE — 92012 INTRM OPH EXAM EST PATIENT: CPT | Performed by: OPHTHALMOLOGY

## 2019-07-02 PROCEDURE — 76514 ECHO EXAM OF EYE THICKNESS: CPT | Performed by: OPHTHALMOLOGY

## 2019-07-02 PROCEDURE — 92015 DETERMINE REFRACTIVE STATE: CPT | Mod: GY | Performed by: OPHTHALMOLOGY

## 2019-07-02 PROCEDURE — 92134 CPTRZ OPH DX IMG PST SGM RTA: CPT | Performed by: OPHTHALMOLOGY

## 2019-07-02 PROCEDURE — 92083 EXTENDED VISUAL FIELD XM: CPT | Performed by: OPHTHALMOLOGY

## 2019-07-02 ASSESSMENT — REFRACTION_MANIFEST
OD_SPHERE: +0.25
OS_CYLINDER: +0.75
OS_AXIS: 137
OD_CYLINDER: +1.25
OD_ADD: +2.50
OS_ADD: +2.50
OD_AXIS: 025
OS_SPHERE: -1.75

## 2019-07-02 ASSESSMENT — PACHYMETRY
OD_CT(UM): 542
OS_CT(UM): 541

## 2019-07-02 ASSESSMENT — VISUAL ACUITY
CORRECTION_TYPE: GLASSES
OS_CC: 20/20
METHOD: SNELLEN - LINEAR
OD_CC: 20/20

## 2019-07-02 ASSESSMENT — SLIT LAMP EXAM - LIDS
COMMENTS: NORMAL
COMMENTS: NORMAL

## 2019-07-02 ASSESSMENT — TONOMETRY
OS_IOP_MMHG: 16
OD_IOP_MMHG: 14
IOP_METHOD: APPLANATION

## 2019-07-02 ASSESSMENT — EXTERNAL EXAM - RIGHT EYE: OD_EXAM: NORMAL

## 2019-07-02 ASSESSMENT — REFRACTION_WEARINGRX
OD_AXIS: 030
OD_SPHERE: +0.25
OS_CYLINDER: +0.75
OS_AXIS: 130
OD_CYLINDER: +0.75
OS_SPHERE: -1.75
OD_ADD: +2.50

## 2019-07-02 ASSESSMENT — EXTERNAL EXAM - LEFT EYE: OS_EXAM: NORMAL

## 2019-07-02 NOTE — PROGRESS NOTES
Current Eye Medications:  Tears prn     Subjective:  MR,iop.oct, hvf and pachy  Pt reports he is seeing well, maybe not quite as clear as  Once did.     Objective:  See Ophthalmology Exam.       Assessment:  Stable intraocular pressure, baseline  glaucoma OCT, and Gilbert Visual Field both eyes in patient who is a glaucoma suspect.  Baseline retinal OCT within normal limits both eyes.      Plan:  Continue observation with regard to glaucoma suspect status.  Glasses Rx given - optional   Return visit 6 months for intraocular pressure check and dilation.    Ervin Honeycutt M.D.  [phone]

## 2019-07-02 NOTE — LETTER
7/2/2019         RE: Taqueria Bone  [address] [facility]  [place] [address]        Dear Colleague,    Thank you for referring your patient, Taqueria Bone, to the [facility]. Please see a copy of my visit note below.     Current Eye Medications:  Tears prn     Subjective:  MR,iop.oct, hvf and pachy  Pt reports he is seeing well, maybe not quite as clear as  Once did.     Objective:  See Ophthalmology Exam.       Assessment:  Stable intraocular pressure, baseline  glaucoma OCT, and Gilbert Visual Field both eyes in patient who is a glaucoma suspect.  Baseline retinal OCT within normal limits both eyes.      Plan:  Continue observation with regard to glaucoma suspect status.  Glasses Rx given - optional   Return visit 6 months for intraocular pressure check and dilation.    Ervin Honeycutt M.D.  [phone]         Again, thank you for allowing me to participate in the care of your patient.        Sincerely,        Ervin Honeycutt MD

## 2019-07-02 NOTE — PATIENT INSTRUCTIONS
Continue observation with regard to glaucoma suspect status.  Glasses Rx given - optional   Return visit 6 months for intraocular pressure check and dilation.    Ervin Honeycutt M.D.  [phone]

## 2019-07-15 ENCOUNTER — OFFICE VISIT (OUTPATIENT)
Dept: FAMILY MEDICINE | Facility: CLINIC | Age: 74
End: 2019-07-15
Payer: MEDICARE

## 2019-07-15 VITALS
WEIGHT: 180 LBS | OXYGEN SATURATION: 98 % | HEART RATE: 100 BPM | DIASTOLIC BLOOD PRESSURE: 82 MMHG | TEMPERATURE: 98 F | RESPIRATION RATE: 20 BRPM | BODY MASS INDEX: 27.37 KG/M2 | SYSTOLIC BLOOD PRESSURE: 130 MMHG

## 2019-07-15 DIAGNOSIS — K21.9 GASTROESOPHAGEAL REFLUX DISEASE WITHOUT ESOPHAGITIS: ICD-10-CM

## 2019-07-15 DIAGNOSIS — F41.9 ANXIETY: ICD-10-CM

## 2019-07-15 DIAGNOSIS — R07.0 THROAT PAIN: ICD-10-CM

## 2019-07-15 PROCEDURE — 99213 OFFICE O/P EST LOW 20 MIN: CPT | Performed by: FAMILY MEDICINE

## 2019-07-15 RX ORDER — LORAZEPAM 0.5 MG/1
0.5 TABLET ORAL DAILY PRN
Qty: 12 TABLET | Refills: 0 | Status: SHIPPED | OUTPATIENT
Start: 2019-07-15 | End: 2023-01-21

## 2019-07-15 ASSESSMENT — ANXIETY QUESTIONNAIRES
2. NOT BEING ABLE TO STOP OR CONTROL WORRYING: NOT AT ALL
3. WORRYING TOO MUCH ABOUT DIFFERENT THINGS: SEVERAL DAYS
5. BEING SO RESTLESS THAT IT IS HARD TO SIT STILL: NOT AT ALL
IF YOU CHECKED OFF ANY PROBLEMS ON THIS QUESTIONNAIRE, HOW DIFFICULT HAVE THESE PROBLEMS MADE IT FOR YOU TO DO YOUR WORK, TAKE CARE OF THINGS AT HOME, OR GET ALONG WITH OTHER PEOPLE: NOT DIFFICULT AT ALL
7. FEELING AFRAID AS IF SOMETHING AWFUL MIGHT HAPPEN: NOT AT ALL
6. BECOMING EASILY ANNOYED OR IRRITABLE: NOT AT ALL
1. FEELING NERVOUS, ANXIOUS, OR ON EDGE: SEVERAL DAYS
GAD7 TOTAL SCORE: 2

## 2019-07-15 ASSESSMENT — PAIN SCALES - GENERAL: PAINLEVEL: NO PAIN (0)

## 2019-07-15 ASSESSMENT — PATIENT HEALTH QUESTIONNAIRE - PHQ9
5. POOR APPETITE OR OVEREATING: NOT AT ALL
SUM OF ALL RESPONSES TO PHQ QUESTIONS 1-9: 7

## 2019-07-15 NOTE — NURSING NOTE
"Chief Complaint   Patient presents with     Anxiety     Gastrophageal Reflux     Health Maintenance     ADV Dir       Initial /82   Pulse 100   Temp 98  F (36.7  C) (Oral)   Resp 20   Wt 81.6 kg (180 lb)   SpO2 98%   BMI 27.37 kg/m   Estimated body mass index is 27.37 kg/m  as calculated from the following:    Height as of 1/8/19: 1.727 m (5' 8\").    Weight as of this encounter: 81.6 kg (180 lb).  Medication Reconciliation: complete  Gianna Alejandro, HANK  "

## 2019-07-15 NOTE — PROGRESS NOTES
"SUBJECTIVE:  Taqueria Bone is a 73 year old male who presents for a follow up evaluation of anxiety. The patient uses lorazepam when he travels.   He has trouble sleeping the night before his flight, the first night in a hotel and the night before he leaves to come back home.  He dies not feel drugged or tired in the am after he takes it.           KEDAR-7    Over the last 2 weeks, how often have you been bothered by the following problems?  (Use an \"x\" to indicate your answer) Not at all                (0) Several days                (1) More than half the days        (2) Nearly every day          (3)   1. Feeling nervous, anxious or on edge  x     2. Not being able to stop or control worrying x      3. Worrying too much about different things  x     4. Trouble relaxing x      5. Being so restless that it is hard to sit still x      6. Becoming easily annoyed or irritable x      7. Feeling afraid as if something awful might happen x        Total_______    Cut points for:   Mild Anxiety =  5  Moderate= 10  Severe=  15    KEDAR-7 SCORE 8/15/2017 10/3/2018   Total Score 2 2           Last PHQ-9 score on record= 10      OBJECTIVE:  General: the patient had a calm  affect during the visit today.    ASSESSMENT: Generalized Anxiety Disorder (KEDAR) concentrated around travel.         PLAN:  He was given a refill on the lorazepam for a total of 12  pills. We discussed the importance of using this medication only on a as needed basis as tolerance and dependency can develop with regular use.  --------------------------------------------------------------------------------------------------------------------------------------  SUBJECTIVE:  HPI: Taqueria Bone is a 73 year old male who presents for follow up on symptoms of GERD. He has seen Gastroenterology(ist) and just had an EGD in the spring of 2019. The report is in the chart.     He uses the bicarbonate solution as needed      He had the EGD last spring    He is " supppossed to have a repeat exam in 2 years         Allergies as of 07/15/2019 - Reviewed 07/15/2019   Allergen Reaction Noted     Azithromycin GI Disturbance 12/08/2009     Lactose  01/18/2012     Levaquin  12/08/2009     Omeprazole  11/09/2018     Penicillins Hives 12/08/2009     Xylocaine [lidocaine hcl]  02/11/2010       Current Outpatient Medications:      amLODIPine (NORVASC) 5 MG tablet, TAKE 2 TABLETS DAILY, Disp: 180 tablet, Rfl: 0     Artificial Tear Ointment (ARTIFICIAL TEARS) OINT, 4 x a day as needed and at bedtime., Disp: 1 Tube, Rfl: 0     aspirin 325 MG EC tablet, Take 325 mg by mouth daily. 1/2 pill a day. , Disp: , Rfl:      atorvastatin (LIPITOR) 20 MG tablet, Take 1 tablet (20 mg) by mouth At Bedtime, Disp: 90 tablet, Rfl: 3     cetirizine (ZYRTEC) 10 MG tablet, Take 5 mg by mouth 2 times daily Taking 1/2 tablet once daily, Disp: , Rfl:      FISH OIL, 1 capsule., Disp: , Rfl:      fluticasone (FLONASE) 50 MCG/ACT nasal spray, USE 1 SPRAY IN EACH NOSTRIL TWICE A DAY, Disp: 48 g, Rfl: 2     GLUCOS-AMANDA-MSM-TS-T-WVEP-SECU PO TABS, one daily, Disp: , Rfl:      LORazepam (ATIVAN) 0.5 MG tablet, Take 1 tablet (0.5 mg) by mouth daily as needed, Disp: 30 tablet, Rfl: 0     meclizine (ANTIVERT) 25 MG tablet, Take 1 tablet (25 mg) by mouth 3 times daily as needed, Disp: 30 tablet, Rfl: 1     MULTIVITAMIN TABS   OR, 1 tablet daily, Disp: , Rfl:      PROSTATE PO, QD, Disp: , Rfl:      ranitidine (ZANTAC) 150 MG tablet, Take 1 tablet (150 mg) by mouth 2 times daily, Disp: 180 tablet, Rfl: 3     triamcinolone (KENALOG) 0.1 % cream, Apply sparingly to affected area three times daily for 14 days., Disp: 30 g, Rfl: 1  Allergies as of    Diagnosis     Anxiety     Chronic nonallergic rhinitis     CARDIOVASCULAR SCREENING; LDL GOAL LESS THAN 130     Advanced directives, counseling/discussion     Insomnia     Medial epicondylitis     Episodic recurrent vertigo     BPH (benign prostatic hyperplasia)     Seasonal  allergic rhinitis     Essential hypertension with goal blood pressure less than 140/90     Chronic low back pain     Right knee pain     Personal history of tobacco use, presenting hazards to health     10 year risk of MI or stroke 7.5% or greater, 20.7% in October 2018     Adenomatous polyp of colon, unspecified part of colon     Gastroesophageal reflux disease without esophagitis     IGT (impaired glucose tolerance)     Benign prostatic hyperplasia with nocturia     Pseudophakia, ou; Yag Caps, os     Glaucoma suspect of both eyes     Macular pigment deposit, od     Sales's esophagus without dysplasia         PHYSICAL EXAM:  /82   Pulse 100   Temp 98  F (36.7  C) (Oral)   Resp 20   Wt 81.6 kg (180 lb)   SpO2 98%   BMI 27.37 kg/m     General: healthy, alert and no distress    ASSESSMENT / IMPRESSION:  1)Gastroesophageal Reflux Disease  2) Sales's esophagus without dysplasia    PLAN:  Continue the current medication.  Continue current life style changes including: eat smaller, more frequent meals, last meal at least 3 hours before bedtime and avoid chocolate, citrus, alcohol, caffeine, and peppermint  I did recommend that the patient make sure to get the recommended daily dose of calcium either through dietary sources or through supplement to compensate for the decreased calcium absorption seen in patients on long term PPI's.

## 2019-07-16 ASSESSMENT — ANXIETY QUESTIONNAIRES: GAD7 TOTAL SCORE: 2

## 2019-08-19 DIAGNOSIS — Z91.89 10 YEAR RISK OF MI OR STROKE 7.5% OR GREATER: ICD-10-CM

## 2019-08-19 RX ORDER — ATORVASTATIN CALCIUM 20 MG/1
TABLET, FILM COATED ORAL
Qty: 90 TABLET | Refills: 0 | Status: SHIPPED | OUTPATIENT
Start: 2019-08-19 | End: 2019-11-17

## 2019-08-31 DIAGNOSIS — I10 ESSENTIAL HYPERTENSION WITH GOAL BLOOD PRESSURE LESS THAN 140/90: ICD-10-CM

## 2019-09-03 ENCOUNTER — TELEPHONE (OUTPATIENT)
Dept: FAMILY MEDICINE | Facility: CLINIC | Age: 74
End: 2019-09-03

## 2019-09-03 DIAGNOSIS — I10 ESSENTIAL HYPERTENSION WITH GOAL BLOOD PRESSURE LESS THAN 140/90: ICD-10-CM

## 2019-09-03 RX ORDER — AMLODIPINE BESYLATE 5 MG/1
10 TABLET ORAL DAILY
Qty: 180 TABLET | Refills: 0 | Status: SHIPPED | OUTPATIENT
Start: 2019-09-03 | End: 2019-09-04

## 2019-09-03 NOTE — TELEPHONE ENCOUNTER
Reason for Call:  Medication or medication refill:    Do you use a [facility]?  Name of the pharmacy and phone number for the current request:  [facility] [phone]    Name of the medication requested: amLODIPine (NORVASC) 5 MG tablet    Other request:     Can we leave a detailed message on this number? YES    Phone number patient can be reached at: Home number on file [phone] (home)    Best Time:     Call taken on 9/3/2019 at 10:35 AM by Leona Wilson

## 2019-09-04 ENCOUNTER — TELEPHONE (OUTPATIENT)
Dept: FAMILY MEDICINE | Facility: CLINIC | Age: 74
End: 2019-09-04

## 2019-09-04 DIAGNOSIS — I10 ESSENTIAL HYPERTENSION WITH GOAL BLOOD PRESSURE LESS THAN 140/90: ICD-10-CM

## 2019-09-04 RX ORDER — AMLODIPINE BESYLATE 5 MG/1
10 TABLET ORAL DAILY
Qty: 180 TABLET | Refills: 0 | Status: SHIPPED | OUTPATIENT
Start: 2019-09-04 | End: 2019-09-04

## 2019-09-04 RX ORDER — AMLODIPINE BESYLATE 5 MG/1
TABLET ORAL
Qty: 180 TABLET | Refills: 4 | OUTPATIENT
Start: 2019-09-04

## 2019-09-04 RX ORDER — AMLODIPINE BESYLATE 5 MG/1
10 TABLET ORAL DAILY
Qty: 60 TABLET | Refills: 0 | Status: SHIPPED | OUTPATIENT
Start: 2019-09-04 | End: 2019-12-09

## 2019-09-04 NOTE — TELEPHONE ENCOUNTER
Patient calling to check on status of his refill request for his amlodipine has two days left and will be out. States requested a 30 day supply be sent to [facility] pharmacy and 90 day supply to [facility] mail order [facility]. Patient has not heard back and would like a call back today please.

## 2019-09-04 NOTE — TELEPHONE ENCOUNTER
Patient informed prescription for 30 day supply sent to [facility] pharmacy and 90 day supply sent to [facility]    Lorrie ROSALESN, RN, CPN

## 2019-10-04 ENCOUNTER — HEALTH MAINTENANCE LETTER (OUTPATIENT)
Age: 74
End: 2019-10-04

## 2019-11-17 DIAGNOSIS — Z91.89 10 YEAR RISK OF MI OR STROKE 7.5% OR GREATER: ICD-10-CM

## 2019-11-19 RX ORDER — ATORVASTATIN CALCIUM 20 MG/1
TABLET, FILM COATED ORAL
Qty: 90 TABLET | Refills: 0 | Status: SHIPPED | OUTPATIENT
Start: 2019-11-19 | End: 2020-02-18

## 2019-12-03 DIAGNOSIS — I10 ESSENTIAL HYPERTENSION WITH GOAL BLOOD PRESSURE LESS THAN 140/90: ICD-10-CM

## 2019-12-03 RX ORDER — AMLODIPINE BESYLATE 5 MG/1
TABLET ORAL
Qty: 180 TABLET | Refills: 0 | Status: SHIPPED | OUTPATIENT
Start: 2019-12-03 | End: 2020-03-03

## 2019-12-03 NOTE — TELEPHONE ENCOUNTER
Patient has upcoming/ pending appointment:    Next 5 appointments (look out 90 days)    Dec 09, 2019 10:30 AM CST  [facility] [address] [facility] [address]  [phone]   Jan 06, 2020  9:45 AM CST  Return Visit with Ervin Honeycutt MD  [facility] [address] [facility] [address]  [phone]          Rx refilled per [facility] refill protocol.    Yuly Prieto BSN, RN

## 2019-12-09 ENCOUNTER — OFFICE VISIT (OUTPATIENT)
Dept: FAMILY MEDICINE | Facility: CLINIC | Age: 74
End: 2019-12-09
Payer: MEDICARE

## 2019-12-09 VITALS
TEMPERATURE: 98.7 F | WEIGHT: 182 LBS | BODY MASS INDEX: 27.58 KG/M2 | OXYGEN SATURATION: 98 % | DIASTOLIC BLOOD PRESSURE: 72 MMHG | SYSTOLIC BLOOD PRESSURE: 125 MMHG | HEIGHT: 68 IN | HEART RATE: 98 BPM

## 2019-12-09 DIAGNOSIS — E55.9 VITAMIN D DEFICIENCY: ICD-10-CM

## 2019-12-09 DIAGNOSIS — K21.9 GASTROESOPHAGEAL REFLUX DISEASE WITHOUT ESOPHAGITIS: ICD-10-CM

## 2019-12-09 DIAGNOSIS — Z13.6 CARDIOVASCULAR SCREENING; LDL GOAL LESS THAN 130: ICD-10-CM

## 2019-12-09 DIAGNOSIS — Z91.89 10 YEAR RISK OF MI OR STROKE 7.5% OR GREATER: ICD-10-CM

## 2019-12-09 DIAGNOSIS — Z83.3 FAMILY HISTORY OF DIABETES MELLITUS: ICD-10-CM

## 2019-12-09 DIAGNOSIS — Z00.00 ENCOUNTER FOR MEDICARE ANNUAL WELLNESS EXAM: Primary | ICD-10-CM

## 2019-12-09 DIAGNOSIS — N40.1 BENIGN PROSTATIC HYPERPLASIA WITH NOCTURIA: ICD-10-CM

## 2019-12-09 DIAGNOSIS — R35.1 BENIGN PROSTATIC HYPERPLASIA WITH NOCTURIA: ICD-10-CM

## 2019-12-09 DIAGNOSIS — R73.02 IGT (IMPAIRED GLUCOSE TOLERANCE): ICD-10-CM

## 2019-12-09 PROCEDURE — G0439 PPPS, SUBSEQ VISIT: HCPCS | Performed by: FAMILY MEDICINE

## 2019-12-09 RX ORDER — FAMOTIDINE 20 MG/1
20 TABLET, FILM COATED ORAL 2 TIMES DAILY
Qty: 180 TABLET | Refills: 3 | Status: SHIPPED | OUTPATIENT
Start: 2019-12-09 | End: 2021-02-03

## 2019-12-09 RX ORDER — ACETAMINOPHEN 325 MG/1
325-650 TABLET ORAL DAILY
COMMUNITY

## 2019-12-09 RX ORDER — COVID-19 ANTIGEN TEST
220 KIT MISCELLANEOUS DAILY
COMMUNITY
End: 2023-01-21

## 2019-12-09 RX ORDER — FAMOTIDINE 10 MG
10 TABLET ORAL 2 TIMES DAILY
COMMUNITY
End: 2021-07-30

## 2019-12-09 ASSESSMENT — ENCOUNTER SYMPTOMS
HEMATURIA: 0
HEADACHES: 0
NAUSEA: 0
SHORTNESS OF BREATH: 0
FEVER: 0
DIARRHEA: 0
JOINT SWELLING: 0
ARTHRALGIAS: 1
WEAKNESS: 0
HEARTBURN: 0
COUGH: 1
SORE THROAT: 0
EYE PAIN: 0
DIZZINESS: 1
HEMATOCHEZIA: 0
MYALGIAS: 0
DYSURIA: 0
ABDOMINAL PAIN: 0
PALPITATIONS: 0
CHILLS: 0
CONSTIPATION: 0
NERVOUS/ANXIOUS: 1
FREQUENCY: 0
PARESTHESIAS: 0

## 2019-12-09 ASSESSMENT — MIFFLIN-ST. JEOR: SCORE: 1545.05

## 2019-12-09 ASSESSMENT — ACTIVITIES OF DAILY LIVING (ADL): CURRENT_FUNCTION: NO ASSISTANCE NEEDED

## 2019-12-09 NOTE — PATIENT INSTRUCTIONS
Patient Education   Personalized Prevention Plan  You are due for the preventive services outlined below.  Your care team is available to assist you in scheduling these services.  If you have already completed any of these items, please share that information with your care team to update in your medical record.  Health Maintenance Due   Topic Date Due     URINE DRUG SCREEN  1945     Discuss Advance Care Planning  06/17/2019     Basic Metabolic Panel  09/26/2019     Annual Wellness Visit  10/03/2019     FALL RISK ASSESSMENT  11/09/2019        At your visit today, we discussed your risk for falls and preventive options.    Fall Prevention  Falls often occur due to slipping, tripping or losing your balance. Millions of people fall every year and injure themselves. Here are ways to reduce your risk of falling again.    Think about your fall, was there anything that caused your fall that can be fixed, removed, or replaced?    Make your home safe by keeping walkways clear of objects you may trip over, such as electric cords.    Use non-slip pads under rugs. Don't use area rugs or small throw rugs.    Use non-slip mats in bathtubs and showers.    Install handrails and lights on staircases. The handrails should be on both sides of the stairs.    Don't walk in poorly lit areas.    Don't stand on chairs or wobbly ladders.    Use caution when reaching overhead or looking upward. This position can cause a loss of balance.    Be sure your shoes fit properly, have non-slip bottoms and are in good condition.     Wear shoes both inside and out. Don't go barefoot or wear slippers.    Be cautious when going up and down stairs, curbs, and when walking on uneven sidewalks.    If your balance is poor, consider using a cane or walker.    If your fall was related to alcohol use, stop or limit alcohol intake.     If your fall was related to use of sleeping medicines, talk to your healthcare provider about this. You may need to  reduce your dosage at bedtime if you awaken during the night to go to the bathroom.      To reduce the need for nighttime bathroom trips:  ? Don't drink fluids for several hours before going to bed  ? Empty your bladder before going to bed  ? Men can keep a urinal at the bedside    Stay as active as you can. Balance, flexibility, strength, and endurance all come from exercise. They all play a role in preventing falls. Ask your healthcare provider which types of activity are right for you.    Get your vision checked on a regular basis.    If you have pets, know where they are before you stand up or walk so you don't trip over them.    Use night lights.    Go over all your medicines with a pharmacist or other healthcare provider to see if any of them could make you more likely to fall.  Date Last Reviewed: 4/1/2018 2000-2018 [employer]. [address] [place] [address]. All rights reserved. This information is not intended as a substitute for professional medical care. Always follow your healthcare professional's instructions.    Please schedule a future laboratory appointment(s) and be sure to have fasted for 10 hours prior to arrival      Patient Education     Preventing Osteoporosis: Meeting Your Calcium Needs    Your body needs calcium to build and repair bones. But it can't make calcium on its own. That's why it's important to eat calcium-rich foods. Some foods are naturally rich in calcium. Others have calcium added (fortified). It's best to get calcium from the foods you eat. But if you can't get enough, you may want to take calcium supplements. To meet your daily calcium needs, try the foods listed below.  Dairy Fish & beans Other sources   Source   Calcium (mg) per serving   Source   Calcium (mg) per serving   Source   Calcium (mg) per serving   Low-fat yogurt, plain   415 mg/8 oz.   Sardines, Atlantic, canned, with bones   351 mg/3 oz.   Oatmeal, instant, fortified   215 mg/1 cup    Nonfat milk   302 mg/1 cup   [place], sockeye, canned, with bones   239 mg/3 oz.   Tofu made with calcium sulfate   204 mg/3 oz.   Low-fat milk   297 mg/1 cup   Soybeans, fresh, boiled   131 mg/1/2 cup   Collards   179 mg/1/2 cup   Swiss cheese   272 mg/1 oz.   White beans, cooked   81 mg/1/2 cup   English muffin, whole wheat   175 mg/1 muffin   Cheddar cheese   205 mg/1 oz.   Navy beans, cooked   79 mg/1/2 cup   Kale   90 mg/1/2 cup   Ice cream strawberry   79 mg/1/2 cup           Orange, navel   56 mg/1 medium   Note: Calcium levels may vary depending on brand and size.  Daily calcium needs  14 to 18 years old: 1,300 mg  19 to 30 years old: 1,000 mg  31 to 50 years old: 1,000 mg  51 to 70 years old, women: 1,200 mg  51 to 70 years old, men: 1,000 mg  Pregnant or nursin to 18 years old: 1,300 mg, 19 to 50 years old: 1,000 mg  Older than 70 (women and men): 1,200 mg   Date Last Reviewed: 2018-2018 [employer]. [address] [place] [address], [place] [address]. All rights reserved. This information is not intended as a substitute for professional medical care. Always follow your healthcare professional's instructions.

## 2019-12-09 NOTE — PROGRESS NOTES
"SUBJECTIVE:   Taqueria Bone is a 74 year old male who presents for Preventive Visit.    Are you in the first 12 months of your Medicare coverage?  No    Healthy Habits:     In general, how would you rate your overall health?  Good    Frequency of exercise:  4-5 days/week    Duration of exercise:  Greater than 60 minutes    Do you usually eat at least 4 servings of fruit and vegetables a day, include whole grains    & fiber and avoid regularly eating high fat or \"junk\" foods?  Yes    Taking medications regularly:  Yes    Medication side effects:  None    Ability to successfully perform activities of daily living:  No assistance needed    Home Safety:  Throw rugs in the hallway    Hearing Impairment:  Difficulty following a conversation in a noisy restaurant or crowded room and difficulty understanding soft or whispered speech    In the past 6 months, have you been bothered by leaking of urine?  No    In general, how would you rate your overall mental or emotional health?  Good      PHQ-2 Total Score: 0    Additional concerns today:  Yes    Do you feel safe in your environment? No    Have you ever done Advance Care Planning? (For example, a Health Directive, POLST, or a discussion with a medical provider or your loved ones about your wishes): No, advance care planning information given to patient to review.  Advanced care planning was discussed at today's visit.      Fall risk  Fallen 2 or more times in the past year?: Yes  Any fall with injury in the past year?: Yes  click delete button to remove this line now  Cognitive Screening   1) Repeat 3 items (Leader, Season, Table)    2) Clock draw: NORMAL  3) 3 item recall: Recalls 3 objects  Results: 3 items recalled: COGNITIVE IMPAIRMENT LESS LIKELY    Mini-CogTM Copyright [employer]. Licensed by the author for use in [place] [employer] [facility]; reprinted with permission (albert@.[facility]). All rights reserved.      Do you have sleep apnea, excessive snoring or daytime " drowsiness?: no    Reviewed and updated as needed this visit by clinical staff  Tobacco  Allergies  Meds  Med Hx  Surg Hx  Fam Hx  Soc Hx        Reviewed and updated as needed this visit by Provider  Tobacco        Social History     Tobacco Use     Smoking status: Former Smoker     Packs/day: 0.50     Years: 18.00     Pack years: 9.00     Types: Cigarettes, Cigars, Pipe, Dip, chew, snus or snuff     Start date: 10/1/1961     Last attempt to quit: 10/31/1979     Years since quittin.1     Smokeless tobacco: Former User     Quit date: 10/1/1970     Tobacco comment:    Substance Use Topics     Alcohol use: Yes     Alcohol/week: 0.8 standard drinks     Comment: 1-2 beer daily     If you drink alcohol do you typically have >3 drinks per day or >7 drinks per week? No    Alcohol Use 2019   Prescreen: >3 drinks/day or >7 drinks/week? No   Prescreen: >3 drinks/day or >7 drinks/week? -             Current providers sharing in care for this patient include:   Patient Care Team:  Myron Bob MD as PCP - General (Family Practice)  Myron Bob MD as Assigned PCP    The following health maintenance items are reviewed in Epic and correct as of today:  Health Maintenance   Topic Date Due     URINE DRUG SCREEN  1945     ADVANCE CARE PLANNING  2019     BMP  2019     MEDICARE ANNUAL WELLNESS VISIT  10/03/2019     FALL RISK ASSESSMENT  2019     COLONOSCOPY  10/09/2022     LIPID  2023     DTAP/TDAP/TD IMMUNIZATION (4 - Td) 10/03/2028     HEPATITIS C SCREENING  Completed     PHQ-2  Completed     INFLUENZA VACCINE  Completed     PNEUMOCOCCAL IMMUNIZATION 65+ LOW/MEDIUM RISK  Completed     ZOSTER IMMUNIZATION  Completed     AORTIC ANEURYSM SCREENING (SYSTEM ASSIGNED)  Completed     IPV IMMUNIZATION  Aged Out     MENINGITIS IMMUNIZATION  Aged Out           Review of Systems   Constitutional: Negative for chills and fever.   HENT: Positive for hearing loss. Negative for  "congestion, ear pain and sore throat.    Eyes: Negative for pain and visual disturbance.   Respiratory: Positive for cough. Negative for shortness of breath.    Cardiovascular: Negative for chest pain, palpitations and peripheral edema.   Gastrointestinal: Negative for abdominal pain, constipation, diarrhea, heartburn, hematochezia and nausea.   Genitourinary: Negative for discharge, dysuria, frequency, genital sores, hematuria, impotence and urgency.   Musculoskeletal: Positive for arthralgias. Negative for joint swelling and myalgias.   Skin: Negative for rash.   Neurological: Positive for dizziness. Negative for weakness, headaches and paresthesias.   Psychiatric/Behavioral: Negative for mood changes. The patient is nervous/anxious.          OBJECTIVE:   /72   Pulse 98   Temp 98.7  F (37.1  C) (Oral)   Ht 1.727 m (5' 8\")   Wt 82.6 kg (182 lb)   SpO2 98%   BMI 27.67 kg/m   Estimated body mass index is 27.67 kg/m  as calculated from the following:    Height as of this encounter: 1.727 m (5' 8\").    Weight as of this encounter: 82.6 kg (182 lb).  Physical Exam          ASSESSMENT / PLAN:       ICD-10-CM    1. Encounter for Medicare annual wellness exam Z00.00 Lipid panel reflex to direct LDL Fasting     **Comprehensive metabolic panel FUTURE anytime   2. Family history of diabetes mellitus Z83.3 **Comprehensive metabolic panel FUTURE anytime   3. IGT (impaired glucose tolerance) R73.02    4. Gastroesophageal reflux disease without esophagitis K21.9 famotidine (PEPCID) 20 MG tablet     **Comprehensive metabolic panel FUTURE anytime   5. 10 year risk of MI or stroke 7.5% or greater, 20.7% in October 2018 Z91.89 Lipid panel reflex to direct LDL Fasting   6. Benign prostatic hyperplasia with nocturia N40.1 **Comprehensive metabolic panel FUTURE anytime    R35.1    7. Vitamin D deficiency E55.9 25 Hydroxyvitamin D2 and D3   8. CARDIOVASCULAR SCREENING; LDL GOAL LESS THAN 130 Z13.6 Lipid panel reflex to direct " "LDL Fasting       COUNSELING:  Reviewed preventive health counseling, as reflected in patient instructions       Consider AAA screening for ages 65-75 and smoking history       Regular exercise       Healthy diet/nutrition       Vision screening       Hearing screening       Dental care       Immunizations    Up to date            Aspirin Prophylaxsis       Colon cancer screening       Prostate cancer screening       Osteoporosis Prevention/Bone Health    Estimated body mass index is 27.67 kg/m  as calculated from the following:    Height as of this encounter: 1.727 m (5' 8\").    Weight as of this encounter: 82.6 kg (182 lb).    Weight management plan: Discussed healthy diet and exercise guidelines     reports that he quit smoking about 40 years ago. His smoking use included cigarettes, cigars, pipe, and dip, chew, snus or snuff. He started smoking about 58 years ago. He has a 9.00 pack-year smoking history. He quit smokeless tobacco use about 49 years ago.      Appropriate preventive services were discussed with this patient, including applicable screening as appropriate for cardiovascular disease, diabetes, osteopenia/osteoporosis, and glaucoma.  As appropriate for age/gender, discussed screening for colorectal cancer, prostate cancer, breast cancer, and cervical cancer. Checklist reviewing preventive services available has been given to the patient.    Reviewed patients plan of care and provided an AVS. The Basic Care Plan (routine screening as documented in Health Maintenance) for Taqueria meets the Care Plan requirement. This Care Plan has been established and reviewed with the Patient.    Counseling Resources:  ATP IV Guidelines  Pooled Cohorts Equation Calculator  Breast Cancer Risk Calculator  FRAX Risk Assessment  ICSI Preventive Guidelines  Dietary Guidelines for Americans, 2010  USDA's MyPlate  ASA Prophylaxis  Lung CA Screening    Myron Bob MD  [facility] " Risks:  --------------------------------------------------------------------------------------------------------------------------------------  SUBJECTIVE:  Taqueria Bone is a 74 year old male who presents to the clinic today for a routine physical exam.    The patient's last physical was 10-3-18.      Cholesterol   Date Value Ref Range Status   09/26/2018 188 <200 mg/dL Final   11/10/2017 196 <200 mg/dL Final     HDL Cholesterol   Date Value Ref Range Status   09/26/2018 55 >39 mg/dL Final   11/10/2017 61 >39 mg/dL Final     LDL Cholesterol Calculated   Date Value Ref Range Status   09/26/2018 109 (H) <100 mg/dL Final     Comment:     Above desirable:  100-129 mg/dl  Borderline High:  130-159 mg/dL  High:             160-189 mg/dL  Very high:       >189 mg/dl     11/10/2017 116 (H) <100 mg/dL Final     Comment:     Above desirable:  100-129 mg/dl  Borderline High:  130-159 mg/dL  High:             160-189 mg/dL  Very high:       >189 mg/dl       Triglycerides   Date Value Ref Range Status   09/26/2018 118 <150 mg/dL Final     Comment:     Fasting specimen   11/10/2017 97 <150 mg/dL Final     Comment:     Fasting specimen     Cholesterol/HDL Ratio   Date Value Ref Range Status   06/20/2014 3.2 0.0 - 5.0 Final   01/09/2012 4.3 0.0 - 5.0 Final     The patient's last fasting lipid panel was done 1 years ago and the results are listed above.      The 10-year ASCVD risk score (Jack ESTRADA Jr., et al., 2013) is: 23%    Values used to calculate the score:      Age: 74 years      Sex: Male      Is Non- : No      Diabetic: No      Tobacco smoker: No      Systolic Blood Pressure: 125 mmHg      Is BP treated: Yes      HDL Cholesterol: 55 mg/dL      Total Cholesterol: 188 mg/dL    Risk Enhancers:  Family history of premature ASCVD  LDL >159  Chronic kidney disease   Metabolic Syndrome  Inflammatory conditions (RA, psoriasis, HIV)  SE  Ancestry  Triglycerides >174      The patient reports that he  has been treated for high blood pressure.    The patient reports that he does take a daily aspirin.    Lab Results   Component Value Date    HCVAB Negative 07/22/2009     The patient reports that he has been screened for Hepatitis C    (Screen all baby boomers once per CDC-- the generation born from 1945 through 1965 and per USPTF screen age 19 to 79 especially younger people who have used IV drugs)  He would not like to have an Hepatitis C test today        Immunization History   Administered Date(s) Administered     Influenza (H1N1) 01/07/2010     Influenza (High Dose) 3 valent vaccine 10/25/2011, 11/23/2015, 12/05/2016, 10/15/2017, 10/03/2018     Influenza (IIV3) PF 10/01/2008, 09/23/2009, 09/28/2010, 10/12/2012, 10/20/2013     LYMERIX 03/23/1999, 04/22/1999     Mantoux Tuberculin Skin Test 02/15/2005     Pneumo Conj 13-V (2010&after) 11/23/2015     Pneumococcal 23 valent 11/02/2005, 10/25/2011     TD (ADULT, 7+) 12/16/1997     TDAP Vaccine (Adacel) 10/03/2018     Tdap (Adacel,Boostrix) 11/13/2008     Twinrix A/B 01/30/2012, 03/05/2012, 11/13/2012     Zoster vaccine recombinant adjuvanted (SHINGRIX) 10/03/2018, 12/03/2018     Zoster vaccine, live 01/16/2012     The patient's believes that his last tetanus shot was given 1 year(s) ago.   The patient believes that he has had a Shingrix in the past  The patient believes that he has had a PPSV23 in the past.  The patient believes that he has had a PCV13 in the past.  The patient believes that he has had a high dose seasonal flu vaccination this fall or winter at [facility]  The patient would like to have a no vaccinations today      No results found for this or any previous visit.]   The patient reports a family history of colon cancer in his father at age 78.  The patient reports that he has had a colonoscopy. His  last colonoscopy was in 2017 and he  report that is was normal. The patient was told to have this repeated in 5 years.      The patient reports that he  and his wife or partner are not using contraception as she has gone through menopause.    The patient reports a family history of diabetes in both parents.    The patient reports that he eats or drinks 2 servings of dairy products per day. He does not take a calcium supplement at all.  The patient reports that he has dental appointments approximately every 6 months.  The patient reports that he  has an eye examination approximately every 0.5 year(s).    Do you currently smoke? No, quit in 1979  How many years have you smoked? n/a   How many packs per day did you smoke on average? N/A  (if more than 30 pack year history and the patient is age 55-80 consider ordering an annual low dose radiation lung CT to screen for cancer)  (Do not order if patient has quit more than 15 years ago or has a health condition that limits life expectancy or could not tolerate curative lung surgery)  Are you interested having a lung CT to screen for lung cancer? N/A    If the patient has smoked more that 100 cigarettes, has the patient had an imaging study (US or CT) for an AAA between the ages of 65 and 75? Yes: in 2017            Patient Active Problem List   Diagnosis     Anxiety     Chronic nonallergic rhinitis     CARDIOVASCULAR SCREENING; LDL GOAL LESS THAN 130     Advanced directives, counseling/discussion     Insomnia     Medial epicondylitis     Episodic recurrent vertigo     BPH (benign prostatic hyperplasia)     Seasonal allergic rhinitis     Essential hypertension with goal blood pressure less than 140/90     Chronic low back pain     Right knee pain     Personal history of tobacco use, presenting hazards to health     10 year risk of MI or stroke 7.5% or greater, 20.7% in October 2018     Adenomatous polyp of colon, unspecified part of colon     Gastroesophageal reflux disease without esophagitis     IGT (impaired glucose tolerance)     Benign prostatic hyperplasia with nocturia     Pseudophakia, ou; Yag Caps, os     Glaucoma  suspect of both eyes     Macular pigment deposit, od     Sales's esophagus without dysplasia       Past Surgical History:   Procedure Laterality Date     ------------OTHER-------------  7/14/10    YAG CAPSULOTOMY OS     ARTHROSCOPY KNEE Right 2016    Procedure: ARTHROSCOPY KNEE;  Surgeon: Jose Stone MD;  Location: [facility] OR     BIOPSY      taken from the left heel     C SKIN TISSUE PROCEDURE UNLISTED      cysts removed neck / tailbone / back     CATARACT IOL, RT/LT       COLONOSCOPY  3-30-12    Return in 5 yrs.      ENDOSCOPY  3-10-11     HEAD & NECK SURGERY  late     cyst removal on the back of the neck     HERNIA REPAIR  spring of 1992     REPAIR PTOSIS       TONSILLECTOMY & ADENOIDECTOMY         Family History   Problem Relation Age of Onset     Cancer Father         colon, spread to liver     Diabetes Father      Glaucoma Father      Diabetes Mother      Hypertension Mother      Thyroid Disease Mother      Heart Disease Mother 91        MI      Cerebrovascular Disease Maternal Grandfather      Diabetes Paternal Grandfather      Cerebrovascular Disease Paternal Grandfather      Cerebrovascular Disease Maternal Grandmother      Hypertension Brother        Social History     Socioeconomic History     Marital status:      Spouse name: Not on file     Number of children: Not on file     Years of education: Not on file     Highest education level: Not on file   Occupational History     Not on file   Social Needs     Financial resource strain: Not on file     Food insecurity:     Worry: Not on file     Inability: Not on file     Transportation needs:     Medical: Not on file     Non-medical: Not on file   Tobacco Use     Smoking status: Former Smoker     Packs/day: 0.50     Years: 18.00     Pack years: 9.00     Types: Cigarettes, Cigars, Pipe, Dip, chew, snus or snuff     Start date: 10/1/1961     Last attempt to quit: 10/31/1979     Years since quittin.1     Smokeless tobacco: Former User      Quit date: 10/1/1970     Tobacco comment: 1979   Substance and Sexual Activity     Alcohol use: Yes     Alcohol/week: 0.8 standard drinks     Comment: 1-2 beer daily     Drug use: No     Sexual activity: Yes     Partners: Female     Birth control/protection: Male Surgical   Lifestyle     Physical activity:     Days per week: Not on file     Minutes per session: Not on file     Stress: Not on file   Relationships     Social connections:     Talks on phone: Not on file     Gets together: Not on file     Attends Jehovah's witness service: Not on file     Active member of club or organization: Not on file     Attends meetings of clubs or organizations: Not on file     Relationship status: Not on file     Intimate partner violence:     Fear of current or ex partner: Not on file     Emotionally abused: Not on file     Physically abused: Not on file     Forced sexual activity: Not on file   Other Topics Concern     Parent/sibling w/ CABG, MI or angioplasty before 65F 55M? No   Social History Narrative     Not on file       Current Outpatient Medications   Medication Sig Dispense Refill     acetaminophen (TYLENOL) 325 MG tablet Take 325-650 mg by mouth daily       amLODIPine (NORVASC) 5 MG tablet TAKE 2 TABLETS DAILY 180 tablet 0     amLODIPine (NORVASC) 5 MG tablet Take 2 tablets (10 mg) by mouth daily 60 tablet 0     Artificial Tear Ointment (ARTIFICIAL TEARS) OINT 4 x a day as needed and at bedtime. 1 Tube 0     aspirin 325 MG EC tablet Take 325 mg by mouth daily. 1/2 pill a day.        atorvastatin (LIPITOR) 20 MG tablet TAKE 1 TABLET AT BEDTIME 90 tablet 0     cetirizine (ZYRTEC) 10 MG tablet Take 5 mg by mouth 2 times daily Taking 1/2 tablet once daily       famotidine (PEPCID) 10 MG tablet Take 10 mg by mouth 2 times daily       FISH OIL 1 capsule.       fluticasone (FLONASE) 50 MCG/ACT nasal spray USE 1 SPRAY IN EACH NOSTRIL TWICE A DAY 48 g 2     LORazepam (ATIVAN) 0.5 MG tablet Take 1 tablet (0.5 mg) by mouth daily as  "needed for anxiety 12 tablet 0     meclizine (ANTIVERT) 25 MG tablet Take 1 tablet (25 mg) by mouth 3 times daily as needed 30 tablet 1     MULTIVITAMIN TABS   OR 1 tablet daily       naproxen sodium (ALEVE) 220 MG capsule Take 220 mg by mouth daily       PROSTATE PO QD       triamcinolone (KENALOG) 0.1 % cream Apply sparingly to affected area three times daily for 14 days. 30 g 1     GLUCOS-AMANDA-MSM-CD-I-LIJQ-SECU PO TABS one daily       ranitidine (ZANTAC) 150 MG tablet Take 1 tablet (150 mg) by mouth 2 times daily (Patient not taking: Reported on 12/9/2019) 180 tablet 3     PHYSICAL EXAMINATION:  Blood pressure 125/72, pulse 98, temperature 98.7  F (37.1  C), temperature source Oral, height 1.727 m (5' 8\"), weight 82.6 kg (182 lb), SpO2 98 %.  General appearance - healthy, alert and no distress  Skin - Skin color, texture, turgor normal. No rashes or lesions.  Head - Normocephalic. No masses, lesions, tenderness or abnormalities  Eyes - conjunctivae/corneas clear. PERRL, EOM's intact. Fundi benign  Ears - External ears normal. Canals clear. TM's normal.  Nose/Sinuses - Nares normal. Septum midline. Mucosa normal. No drainage or sinus tenderness.  Oropharynx - Lips, mucosa, and tongue normal. Teeth and gums normal.  Neck - Neck supple. No adenopathy. Thyroid symmetric, normal size,  Lungs - Percussion normal. Good diaphragmatic excursion. Lungs clear  Heart - PMI normal. No lifts, heaves, or thrills. RRR. No murmurs, clicks gallops or rub  Abdomen - Abdomen soft, non-tender. BS normal. No masses, organomegaly  Extremities - Extremities normal. No deformities, edema, or skin discoloration.  Musculoskeletal - Spine ROM normal. Muscular strength intact.  Peripheral pulses - radial=4/4, femoral=4/4, popliteal=4/4, dorsalis pedis=4/4,  Neuro - Gait normal. Reflexes normal and symmetric. Sensation grossly WNL.  Genitalia - Penis normal. No urethral discharge. Scrotum normal to palpation. No hernia.  Rectal - Rectal " negative. Prostate palpation negative.  No rectal masses or abnormalities, positive findings: prostate 1+      Office Visit on 01/08/2019   Component Date Value Ref Range Status     Vitamin D Deficiency screening 01/08/2019 33  20 - 75 ug/L Final    Comment: Season, race, dietary intake, and treatment affect the concentration of   25-hydroxy-Vitamin D. Values may decrease during winter months and increase   during summer months. Values 20-29 ug/L may indicate Vitamin D insufficiency   and values <20 ug/L may indicate Vitamin D deficiency.  Vitamin D determination is routinely performed by an immunoassay specific for   25 hydroxyvitamin D3.  If an individual is on vitamin D2 (ergocalciferol)   supplementation, please specify 25 OH vitamin D2 and D3 level determination by   LCMSMS test [id number].         ASSESSMENT:    ICD-10-CM    1. Encounter for Medicare annual wellness exam Z00.00        Well-Adult Physical Exam.  Health Maintenance Due   Topic Date Due     URINE DRUG SCREEN  1945     ADVANCE CARE PLANNING  06/17/2019     BMP  09/26/2019     MEDICARE ANNUAL WELLNESS VISIT  10/03/2019     FALL RISK ASSESSMENT  11/09/2019     Health Maintenance   Topic Date Due     URINE DRUG SCREEN  1945     ADVANCE CARE PLANNING  06/17/2019     BMP  09/26/2019     MEDICARE ANNUAL WELLNESS VISIT  10/03/2019     FALL RISK ASSESSMENT  11/09/2019     COLONOSCOPY  10/09/2022     LIPID  09/26/2023     DTAP/TDAP/TD IMMUNIZATION (4 - Td) 10/03/2028     HEPATITIS C SCREENING  Completed     PHQ-2  Completed     INFLUENZA VACCINE  Completed     PNEUMOCOCCAL IMMUNIZATION 65+ LOW/MEDIUM RISK  Completed     ZOSTER IMMUNIZATION  Completed     AORTIC ANEURYSM SCREENING (SYSTEM ASSIGNED)  Completed     IPV IMMUNIZATION  Aged Out     MENINGITIS IMMUNIZATION  Aged Out         HEALTH CARE MAINTENENCE: The recommended screening tests and vaccinatons for this patient have been discussed as above.  The appropriate tests and vaccinations  have  been ordered or declined by the patient. Please see the orders in EPIC.The patient specifically declines: BPH treatment     Immunization Status:  up to date and documented    Patient Active Problem List   Diagnosis     Anxiety     Chronic nonallergic rhinitis     CARDIOVASCULAR SCREENING; LDL GOAL LESS THAN 130     Advanced directives, counseling/discussion     Insomnia     Medial epicondylitis     Episodic recurrent vertigo     BPH (benign prostatic hyperplasia)     Seasonal allergic rhinitis     Essential hypertension with goal blood pressure less than 140/90     Chronic low back pain     Right knee pain     Personal history of tobacco use, presenting hazards to health     10 year risk of MI or stroke 7.5% or greater, 20.7% in October 2018     Adenomatous polyp of colon, unspecified part of colon     Gastroesophageal reflux disease without esophagitis     IGT (impaired glucose tolerance)     Benign prostatic hyperplasia with nocturia     Pseudophakia, ou; Yag Caps, os     Glaucoma suspect of both eyes     Macular pigment deposit, od     Sales's esophagus without dysplasia        ATP III Guidelines  ICSI Preventive Guidelines    PLAN:   The patient will make a future lab appointment for all bloodwork as they are not fasting today  Check a fasting lipid profile  I recommended discontinuing to take a daily aspirin (81 to 325 mg)  Check a fasting glucose  Discussed calcium intake, vitamins and supplements. Recommended 1000 mg of calcium daily  Sunscreen use was recommended especially in the area of tatoos  Recommended dental exams every 6 months  Recommended eye exam every 1-2 years  Follow up in 1 year for the next preventative medical visit      Body mass index is 27.67 kg/m .

## 2019-12-17 DIAGNOSIS — Z83.3 FAMILY HISTORY OF DIABETES MELLITUS: ICD-10-CM

## 2019-12-17 DIAGNOSIS — E55.9 VITAMIN D DEFICIENCY: ICD-10-CM

## 2019-12-17 DIAGNOSIS — R35.1 BENIGN PROSTATIC HYPERPLASIA WITH NOCTURIA: ICD-10-CM

## 2019-12-17 DIAGNOSIS — Z00.00 ENCOUNTER FOR MEDICARE ANNUAL WELLNESS EXAM: ICD-10-CM

## 2019-12-17 DIAGNOSIS — Z13.6 CARDIOVASCULAR SCREENING; LDL GOAL LESS THAN 130: ICD-10-CM

## 2019-12-17 DIAGNOSIS — K21.9 GASTROESOPHAGEAL REFLUX DISEASE WITHOUT ESOPHAGITIS: ICD-10-CM

## 2019-12-17 DIAGNOSIS — N40.1 BENIGN PROSTATIC HYPERPLASIA WITH NOCTURIA: ICD-10-CM

## 2019-12-17 DIAGNOSIS — Z91.89 10 YEAR RISK OF MI OR STROKE 7.5% OR GREATER: ICD-10-CM

## 2019-12-17 LAB
ALBUMIN SERPL-MCNC: 4.1 G/DL (ref 3.4–5)
ALP SERPL-CCNC: 100 U/L (ref 40–150)
ALT SERPL W P-5'-P-CCNC: 35 U/L (ref 0–70)
ANION GAP SERPL CALCULATED.3IONS-SCNC: 4 MMOL/L (ref 3–14)
AST SERPL W P-5'-P-CCNC: 21 U/L (ref 0–45)
BILIRUB SERPL-MCNC: 0.6 MG/DL (ref 0.2–1.3)
BUN SERPL-MCNC: 14 MG/DL (ref 7–30)
CALCIUM SERPL-MCNC: 9.8 MG/DL (ref 8.5–10.1)
CHLORIDE SERPL-SCNC: 105 MMOL/L (ref 94–109)
CHOLEST SERPL-MCNC: 132 MG/DL
CO2 SERPL-SCNC: 34 MMOL/L (ref 20–32)
CREAT SERPL-MCNC: 0.87 MG/DL (ref 0.66–1.25)
GFR SERPL CREATININE-BSD FRML MDRD: 85 ML/MIN/{1.73_M2}
GLUCOSE SERPL-MCNC: 108 MG/DL (ref 70–99)
HDLC SERPL-MCNC: 54 MG/DL
LDLC SERPL CALC-MCNC: 54 MG/DL
NONHDLC SERPL-MCNC: 78 MG/DL
POTASSIUM SERPL-SCNC: 4.2 MMOL/L (ref 3.4–5.3)
PROT SERPL-MCNC: 7.2 G/DL (ref 6.8–8.8)
SODIUM SERPL-SCNC: 143 MMOL/L (ref 133–144)
TRIGL SERPL-MCNC: 121 MG/DL

## 2019-12-17 PROCEDURE — 36415 COLL VENOUS BLD VENIPUNCTURE: CPT | Performed by: FAMILY MEDICINE

## 2019-12-17 PROCEDURE — 80061 LIPID PANEL: CPT | Performed by: FAMILY MEDICINE

## 2019-12-17 PROCEDURE — 80053 COMPREHEN METABOLIC PANEL: CPT | Performed by: FAMILY MEDICINE

## 2019-12-17 PROCEDURE — 82306 VITAMIN D 25 HYDROXY: CPT | Performed by: FAMILY MEDICINE

## 2019-12-18 NOTE — RESULT ENCOUNTER NOTE
Taqueria,  I have reviewed the results of the laboratory tests that we recently ordered. All of the laboratory that are back so far are normal or considered normal for you. There are still some labs pending and I will let you know those results when they   are available.  Sincerely,   Myron Bob MD

## 2019-12-20 LAB
DEPRECATED CALCIDIOL+CALCIFEROL SERPL-MC: <48 UG/L (ref 20–75)
VITAMIN D2 SERPL-MCNC: <5 UG/L
VITAMIN D3 SERPL-MCNC: 43 UG/L

## 2019-12-20 NOTE — RESULT ENCOUNTER NOTE
Taqueria,  I have reviewed the results of the laboratory tests that we recently ordered. All of the lab work performed was normal or considered normal for you.  Sincerely,   Myron Bob MD

## 2020-01-06 ENCOUNTER — OFFICE VISIT (OUTPATIENT)
Dept: OPHTHALMOLOGY | Facility: CLINIC | Age: 75
End: 2020-01-06
Payer: MEDICARE

## 2020-01-06 DIAGNOSIS — Z96.1 PSEUDOPHAKIA: ICD-10-CM

## 2020-01-06 DIAGNOSIS — H35.3131 EARLY DRY STAGE NONEXUDATIVE AGE-RELATED MACULAR DEGENERATION OF BOTH EYES: ICD-10-CM

## 2020-01-06 DIAGNOSIS — H40.003 GLAUCOMA SUSPECT OF BOTH EYES: Primary | ICD-10-CM

## 2020-01-06 PROCEDURE — 92014 COMPRE OPH EXAM EST PT 1/>: CPT | Performed by: OPHTHALMOLOGY

## 2020-01-06 ASSESSMENT — VISUAL ACUITY
METHOD: SNELLEN - LINEAR
OS_CC: 20/20
OD_CC: 20/25
OD_CC+: -1
CORRECTION_TYPE: GLASSES

## 2020-01-06 ASSESSMENT — TONOMETRY
OS_IOP_MMHG: 16
IOP_METHOD: APPLANATION
OD_IOP_MMHG: 17

## 2020-01-06 ASSESSMENT — CUP TO DISC RATIO
OD_RATIO: 0.5
OS_RATIO: 0.4

## 2020-01-06 ASSESSMENT — SLIT LAMP EXAM - LIDS
COMMENTS: NORMAL
COMMENTS: NORMAL

## 2020-01-06 ASSESSMENT — EXTERNAL EXAM - RIGHT EYE: OD_EXAM: NORMAL

## 2020-01-06 ASSESSMENT — EXTERNAL EXAM - LEFT EYE: OS_EXAM: NORMAL

## 2020-01-06 NOTE — PROGRESS NOTES
" Current Eye Medications:  systane both eyes as needed.     Subjective:  Follow up glaucoma suspect.  Patient is here for a pressure check and dilation.  No vision changes or concerns..   Patient woke a couple weeks ago with fuzzy vision in his right eye which remained for half of the day - this has happened in the past, he feels it is secondary to his right eye rubbing on the blanket or pillow while sleeping.      Uncle has history of severe macular degeneration.     Objective:  See Ophthalmology Exam.       Assessment:  Stable intraocular pressure and discs in patient who is a glaucoma suspect.  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      ICD-10-CM    1. Glaucoma suspect of both eyes H40.003    2. Pseudophakia, ou; Yag Caps, os Z96.1    3. Early dry stage nonexudative age-related macular degeneration of both eyes H35.3131         Plan:  Use artificial tears up to 4 times daily both eyes as needed.  (Refresh Tears, Systane Ultra/Balance, or Theratears)  Continue observation with regard to glaucoma suspect status.  Possible clouding of posterior capsule right eye discussed.  Take a multiple vitamin or \"eye vitamin\" daily (AREDS2).  Protect your eyes outdoors from ultraviolet rays with sunglasses and/or brimmed hat.  Have spinach (cooked or raw), colorful fruits, walnuts, hazelnuts, almonds in your diet.  Monitor the vision in each eye weekly - call if any sudden persistent changes.  Return visit in 6 months for intraocular pressure check, glaucoma OCT and Gilbert Visual Field.     Ervin Honeycutt M.D.  [phone]       "

## 2020-01-06 NOTE — PATIENT INSTRUCTIONS
"Use artificial tears up to 4 times daily both eyes as needed.  (Refresh Tears, Systane Ultra/Balance, or Theratears)  Continue observation with regard to glaucoma suspect status.  Possible clouding of posterior capsule right eye discussed.  Take a multiple vitamin or \"eye vitamin\" daily (AREDS2).  Protect your eyes outdoors from ultraviolet rays with sunglasses and/or brimmed hat.  Have spinach (cooked or raw), colorful fruits, walnuts, hazelnuts, almonds in your diet.  Monitor the vision in each eye weekly - call if any sudden persistent changes.  Return visit in 6 months for intraocular pressure check, glaucoma OCT and Gilbert Visual Field.     Ervin Honeycutt M.D.  [phone]    "

## 2020-01-06 NOTE — LETTER
"    1/6/2020         RE: Taqueria Bone  [address] [facility]  [place] [address]        Dear Colleague,    Thank you for referring your patient, Taqueria Bone, to the [facility]. Please see a copy of my visit note below.     Current Eye Medications:  systane both eyes as needed.     Subjective:  Follow up glaucoma suspect.  Patient is here for a pressure check and dilation.  No vision changes or concerns..   Patient woke a couple weeks ago with fuzzy vision in his right eye which remained for half of the day - this has happened in the past, he feels it is secondary to his right eye rubbing on the blanket or pillow while sleeping.      Uncle has history of severe macular degeneration.     Objective:  See Ophthalmology Exam.       Assessment:  Stable intraocular pressure and discs in patient who is a glaucoma suspect.  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      ICD-10-CM    1. Glaucoma suspect of both eyes H40.003    2. Pseudophakia, ou; Yag Caps, os Z96.1    3. Early dry stage nonexudative age-related macular degeneration of both eyes H35.3131         Plan:  Use artificial tears up to 4 times daily both eyes as needed.  (Refresh Tears, Systane Ultra/Balance, or Theratears)  Continue observation with regard to glaucoma suspect status.  Possible clouding of posterior capsule right eye discussed.  Take a multiple vitamin or \"eye vitamin\" daily (AREDS2).  Protect your eyes outdoors from ultraviolet rays with sunglasses and/or brimmed hat.  Have spinach (cooked or raw), colorful fruits, walnuts, hazelnuts, almonds in your diet.  Monitor the vision in each eye weekly - call if any sudden persistent changes.  Return visit in 6 months for intraocular pressure check, glaucoma OCT and Gilbert Visual Field.     Ervin Honeycutt M.D.  [phone]         Again, thank you for allowing me to participate in the care of your patient.        Sincerely,        Ervin Honeycutt MD    "

## 2020-01-25 DIAGNOSIS — R09.81 SINUS CONGESTION: ICD-10-CM

## 2020-01-27 RX ORDER — FLUTICASONE PROPIONATE 50 MCG
SPRAY, SUSPENSION (ML) NASAL
Qty: 48 G | Refills: 1 | Status: SHIPPED | OUTPATIENT
Start: 2020-01-27 | End: 2020-07-22

## 2020-01-27 NOTE — TELEPHONE ENCOUNTER
Prescription approved per [facility] Refill Protocol.  Dianne Sawant RN

## 2020-02-18 DIAGNOSIS — Z91.89 10 YEAR RISK OF MI OR STROKE 7.5% OR GREATER: ICD-10-CM

## 2020-02-18 RX ORDER — ATORVASTATIN CALCIUM 20 MG/1
20 TABLET, FILM COATED ORAL AT BEDTIME
Qty: 90 TABLET | Refills: 2 | Status: SHIPPED | OUTPATIENT
Start: 2020-02-18 | End: 2020-11-16

## 2020-02-19 NOTE — TELEPHONE ENCOUNTER
Prescription approved per [facility] Refill Protocol. Estefania Burnett RN

## 2020-03-02 DIAGNOSIS — I10 ESSENTIAL HYPERTENSION WITH GOAL BLOOD PRESSURE LESS THAN 140/90: ICD-10-CM

## 2020-03-03 RX ORDER — AMLODIPINE BESYLATE 5 MG/1
TABLET ORAL
Qty: 180 TABLET | Refills: 0 | Status: SHIPPED | OUTPATIENT
Start: 2020-03-03 | End: 2020-06-01

## 2020-03-03 NOTE — TELEPHONE ENCOUNTER
"Requested Prescriptions   Signed Prescriptions Disp Refills    amLODIPine (NORVASC) 5 MG tablet 180 tablet 0     Sig: TAKE 2 TABLETS DAILY       Calcium Channel Blockers Protocol  Passed - 3/2/2020  1:22 AM        Passed - Blood pressure under 140/90 in past 12 months     BP Readings from Last 3 Encounters:   12/09/19 125/72   07/15/19 130/82   01/08/19 126/85                 Passed - Recent (12 mo) or future (30 days) visit within the authorizing provider's specialty     Patient has had an office visit with the authorizing provider or a provider within the authorizing providers department within the previous 12 mos or has a future within next 30 days. See \"Patient Info\" tab in inbasket, or \"Choose Columns\" in Meds & Orders section of the refill encounter.              Passed - Medication is active on med list        Passed - Patient is age 18 or older        Passed - Normal serum creatinine on file in past 12 months     Recent Labs   Lab Test 12/17/19  0842  03/13/13   CR 0.87   < >  --    CRPOC  --   --  0.9    < > = values in this interval not displayed.               "

## 2020-05-31 DIAGNOSIS — I10 ESSENTIAL HYPERTENSION WITH GOAL BLOOD PRESSURE LESS THAN 140/90: ICD-10-CM

## 2020-06-01 RX ORDER — AMLODIPINE BESYLATE 5 MG/1
TABLET ORAL
Qty: 180 TABLET | Refills: 0 | Status: SHIPPED | OUTPATIENT
Start: 2020-06-01 | End: 2020-08-31

## 2020-07-06 ENCOUNTER — OFFICE VISIT (OUTPATIENT)
Dept: OPHTHALMOLOGY | Facility: CLINIC | Age: 75
End: 2020-07-06
Payer: MEDICARE

## 2020-07-06 DIAGNOSIS — H35.3131 EARLY DRY STAGE NONEXUDATIVE AGE-RELATED MACULAR DEGENERATION OF BOTH EYES: ICD-10-CM

## 2020-07-06 DIAGNOSIS — H40.003 GLAUCOMA SUSPECT OF BOTH EYES: Primary | ICD-10-CM

## 2020-07-06 PROCEDURE — 92134 CPTRZ OPH DX IMG PST SGM RTA: CPT | Performed by: OPHTHALMOLOGY

## 2020-07-06 PROCEDURE — 92012 INTRM OPH EXAM EST PATIENT: CPT | Performed by: OPHTHALMOLOGY

## 2020-07-06 PROCEDURE — 92083 EXTENDED VISUAL FIELD XM: CPT | Performed by: OPHTHALMOLOGY

## 2020-07-06 ASSESSMENT — REFRACTION_WEARINGRX
OS_SPHERE: -1.75
OS_CYLINDER: +0.75
OS_AXIS: 130
OD_SPHERE: +0.25
OS_ADD: +2.50
OD_CYLINDER: +0.75
OD_ADD: +2.50
OD_AXIS: 030

## 2020-07-06 ASSESSMENT — EXTERNAL EXAM - LEFT EYE: OS_EXAM: NORMAL

## 2020-07-06 ASSESSMENT — TONOMETRY
OD_IOP_MMHG: 12
OS_IOP_MMHG: 13
IOP_METHOD: APPLANATION

## 2020-07-06 ASSESSMENT — SLIT LAMP EXAM - LIDS
COMMENTS: NORMAL
COMMENTS: NORMAL

## 2020-07-06 ASSESSMENT — EXTERNAL EXAM - RIGHT EYE: OD_EXAM: NORMAL

## 2020-07-06 ASSESSMENT — VISUAL ACUITY
CORRECTION_TYPE: GLASSES
METHOD: SNELLEN - LINEAR
OD_CC: 20/20
OS_CC: 20/25
OS_CC+: -2

## 2020-07-06 NOTE — PROGRESS NOTES
Current Eye Medications:  Systane balance daily as needed both eyes. Fish oil daily, senior multi vitamin daily      Subjective:  6 months for intraocular pressure check, glaucoma OCT and Gilbert Visual Field. Vision is OK both eyes, maybe slowly getting a little worse in distance. Reading is improving. No eye pain or discomfort in either eye.      Objective:  See Ophthalmology Exam.       Assessment:  Stable intraocular pressure, glaucoma OCT, retinal OCT, and Gilbert Visual Field both eyes in patient who is a glaucoma suspect with macular drusen.      Plan:  Continue observation with regard to glaucoma suspect status.  Continue multiple vitamin and fish oil daily.  Return visit 6 months for a complete exam.  Ervin Honeycutt M.D.  [phone]

## 2020-07-06 NOTE — PATIENT INSTRUCTIONS
Continue observation with regard to glaucoma suspect status.  Continue multiple vitamin and fish oil daily.  Return visit 6 months for a complete exam.  Ervin Honeycutt M.D.  [phone]

## 2020-07-06 NOTE — LETTER
7/6/2020         RE: Taqueria Bone  [address] [facility]  [place] [address]        Dear Colleague,    Thank you for referring your patient, Taqueria Bone, to the [facility]. Please see a copy of my visit note below.     Current Eye Medications:  Systane balance daily as needed both eyes. Fish oil daily, senior multi vitamin daily      Subjective:  6 months for intraocular pressure check, glaucoma OCT and Gilbert Visual Field. Vision is OK both eyes, maybe slowly getting a little worse in distance. Reading is improving. No eye pain or discomfort in either eye.      Objective:  See Ophthalmology Exam.       Assessment:  Stable intraocular pressure, glaucoma OCT, retinal OCT, and Gilbert Visual Field both eyes in patient who is a glaucoma suspect with macular drusen.      Plan:  Continue observation with regard to glaucoma suspect status.  Continue multiple vitamin and fish oil daily.  Return visit 6 months for a complete exam.  Ervin Honeycutt M.D.  [phone]           Again, thank you for allowing me to participate in the care of your patient.        Sincerely,        Ervin Honeycutt MD

## 2020-07-07 ASSESSMENT — PACHYMETRY
OS_CT(UM): 541
OD_CT(UM): 542

## 2020-08-30 DIAGNOSIS — I10 ESSENTIAL HYPERTENSION WITH GOAL BLOOD PRESSURE LESS THAN 140/90: ICD-10-CM

## 2020-08-31 RX ORDER — AMLODIPINE BESYLATE 5 MG/1
TABLET ORAL
Qty: 180 TABLET | Refills: 0 | Status: SHIPPED | OUTPATIENT
Start: 2020-08-31 | End: 2020-11-30

## 2020-09-01 ENCOUNTER — VIRTUAL VISIT (OUTPATIENT)
Dept: FAMILY MEDICINE | Facility: CLINIC | Age: 75
End: 2020-09-01
Payer: MEDICARE

## 2020-09-01 DIAGNOSIS — R42 VERTIGO: Primary | ICD-10-CM

## 2020-09-01 PROCEDURE — 99213 OFFICE O/P EST LOW 20 MIN: CPT | Mod: 95 | Performed by: PHYSICIAN ASSISTANT

## 2020-09-01 RX ORDER — MECLIZINE HYDROCHLORIDE 25 MG/1
12.5-25 TABLET ORAL 3 TIMES DAILY PRN
Qty: 30 TABLET | Refills: 0 | Status: SHIPPED | OUTPATIENT
Start: 2020-09-01 | End: 2020-09-01

## 2020-09-01 RX ORDER — MECLIZINE HYDROCHLORIDE 25 MG/1
12.5-25 TABLET ORAL 3 TIMES DAILY PRN
Qty: 30 TABLET | Refills: 1 | Status: SHIPPED | OUTPATIENT
Start: 2020-09-01 | End: 2023-01-21

## 2020-09-01 NOTE — PROGRESS NOTES
"Taqueria Bone is a 74 year old male who is being evaluated via a billable video visit.      The patient has been notified of following:     \"This video visit will be conducted via a call between you and your physician/provider. We have found that certain health care needs can be provided without the need for an in-person physical exam.  This service lets us provide the care you need with a video conversation.  If a prescription is necessary we can send it directly to your pharmacy.  If lab work is needed we can place an order for that and you can then stop by our lab to have the test done at a later time.    Video visits are billed at different rates depending on your insurance coverage.  Please reach out to your insurance provider with any questions.    If during the course of the call the physician/provider feels a video visit is not appropriate, you will not be charged for this service.\"    Patient has given verbal consent for Video visit? Yes  How would you like to obtain your AVS? MyChart  If you are dropped from the video visit, the video invite should be resent to: Text to cell phone: [phone]  Will anyone else be joining your video visit? No      Subjective     Taqueria Bone is a 74 year old male who presents today via video visit for the following health issues:    HPI    Medication Followup of Meclizine (Antivert 25mg)  Vertigo    Taking Medication as prescribed: yes    Side Effects:  None    Medication Helping Symptoms:  NO-not as effective as before- pills are 7 yrs old    Intermittent Vertigo, usually last a few days  Would like a refill, previous bottle from 2013       Video Start Time: 11:13AM    Review of Systems   Constitutional, HEENT, neuro systems are negative, except as otherwise noted.      Objective           Vitals:  No vitals were obtained today due to virtual visit.    Physical Exam     GENERAL: Healthy, alert and no distress  EYES: Eyes grossly normal to inspection.  No " "discharge or erythema, or obvious scleral/conjunctival abnormalities.  RESP: No audible wheeze, cough, or visible cyanosis.  No visible retractions or increased work of breathing.    SKIN: Visible skin clear. No significant rash, abnormal pigmentation or lesions.  NEURO: Cranial nerves grossly intact.  Mentation and speech appropriate for age.  PSYCH: Mentation appears normal, affect normal/bright, judgement and insight intact, normal speech and appearance well-groomed.          Assessment & Plan     1. Vertigo        Meclizine renewed, no changes. If symptoms persist longer than 2 weeks will refer to physical therapy.        BMI:   Estimated body mass index is 27.67 kg/m  as calculated from the following:    Height as of 12/9/19: 1.727 m (5' 8\").    Weight as of 12/9/19: 82.6 kg (182 lb).       Return in about 2 weeks (around 9/15/2020), or if symptoms worsen or fail to improve.    Guadalupe Hopson PA-C  [facility] [place]      Video-Visit Details    Type of service:  Video Visit    Video End Time: 11:18am, 5 mins    Originating Location (pt. Location): Home    Distant Location (provider location):  [facility] [place]     Platform used for Video Visit: Manish        "

## 2020-10-20 DIAGNOSIS — R09.81 SINUS CONGESTION: ICD-10-CM

## 2020-10-20 RX ORDER — FLUTICASONE PROPIONATE 50 MCG
SPRAY, SUSPENSION (ML) NASAL
Qty: 48 G | Refills: 1 | Status: SHIPPED | OUTPATIENT
Start: 2020-10-20 | End: 2021-04-19

## 2020-11-08 ENCOUNTER — HEALTH MAINTENANCE LETTER (OUTPATIENT)
Age: 75
End: 2020-11-08

## 2020-11-28 DIAGNOSIS — I10 ESSENTIAL HYPERTENSION WITH GOAL BLOOD PRESSURE LESS THAN 140/90: ICD-10-CM

## 2020-11-30 RX ORDER — AMLODIPINE BESYLATE 5 MG/1
TABLET ORAL
Qty: 180 TABLET | Refills: 0 | Status: SHIPPED | OUTPATIENT
Start: 2020-11-30 | End: 2021-03-01

## 2020-11-30 NOTE — TELEPHONE ENCOUNTER
Prescription approved per [facility] Refill Protocol.  APPT NEEDED FOR FURTHER REFILLS  Pricilla refill given per RN protocol.   Due for office visit  Pharmacy note to inform pt of office visit needed for continued medication use  Dianne Sawant RN

## 2021-01-11 ENCOUNTER — OFFICE VISIT (OUTPATIENT)
Dept: OPHTHALMOLOGY | Facility: CLINIC | Age: 76
End: 2021-01-11
Payer: MEDICARE

## 2021-01-11 DIAGNOSIS — H35.3131 EARLY DRY STAGE NONEXUDATIVE AGE-RELATED MACULAR DEGENERATION OF BOTH EYES: ICD-10-CM

## 2021-01-11 DIAGNOSIS — H52.4 PRESBYOPIA: ICD-10-CM

## 2021-01-11 DIAGNOSIS — H43.813 POSTERIOR VITREOUS DETACHMENT OF BOTH EYES: ICD-10-CM

## 2021-01-11 DIAGNOSIS — Z01.01 ENCOUNTER FOR EXAMINATION OF EYES AND VISION WITH ABNORMAL FINDINGS: ICD-10-CM

## 2021-01-11 DIAGNOSIS — H40.003 GLAUCOMA SUSPECT OF BOTH EYES: ICD-10-CM

## 2021-01-11 DIAGNOSIS — Z96.1 PSEUDOPHAKIA: Primary | ICD-10-CM

## 2021-01-11 PROCEDURE — 92015 DETERMINE REFRACTIVE STATE: CPT | Mod: GY | Performed by: OPHTHALMOLOGY

## 2021-01-11 PROCEDURE — 92014 COMPRE OPH EXAM EST PT 1/>: CPT | Performed by: OPHTHALMOLOGY

## 2021-01-11 RX ORDER — FAMOTIDINE 20 MG/1
20 TABLET, FILM COATED ORAL 2 TIMES DAILY
COMMUNITY
End: 2021-07-30

## 2021-01-11 ASSESSMENT — VISUAL ACUITY
OD_CC+: -1
CORRECTION_TYPE: GLASSES
OS_SC: 20/60
OS_CC: 20/20
OD_SC: 20/25
OS_CC+: -2
OD_CC: 20/20
METHOD: SNELLEN - LINEAR

## 2021-01-11 ASSESSMENT — EXTERNAL EXAM - LEFT EYE: OS_EXAM: NORMAL

## 2021-01-11 ASSESSMENT — CUP TO DISC RATIO
OS_RATIO: 0.4
OD_RATIO: 0.5

## 2021-01-11 ASSESSMENT — EXTERNAL EXAM - RIGHT EYE: OD_EXAM: NORMAL

## 2021-01-11 ASSESSMENT — REFRACTION_MANIFEST
OD_ADD: +2.75
OD_SPHERE: PLANO
OD_AXIS: 025
OD_CYLINDER: +1.00
OS_CYLINDER: +1.00
OS_SPHERE: -1.50
OS_ADD: +2.75
OS_AXIS: 150

## 2021-01-11 ASSESSMENT — TONOMETRY
OS_IOP_MMHG: 15
OD_IOP_MMHG: 13
IOP_METHOD: APPLANATION

## 2021-01-11 ASSESSMENT — CONF VISUAL FIELD
OD_NORMAL: 1
METHOD: COUNTING FINGERS
OS_NORMAL: 1

## 2021-01-11 NOTE — PATIENT INSTRUCTIONS
"Glasses Rx given - optional  Use artificial tears up to 4 times daily both eyes as needed.  (Refresh Tears, Systane Ultra/Balance, or Theratears)   Possible clouding of posterior capsule right eye discussed.   Take a multiple vitamin or \"eye vitamin\" daily (AREDS2).  Protect your eyes outdoors from ultraviolet rays with sunglasses and/or brimmed hat.  Have spinach (cooked or raw), colorful fruits, walnuts, hazelnuts, almonds in your diet.  Monitor the vision in each eye weekly - call if any sudden persistent changes.  Call in October 2021 for an appointment in January 2022 for Complete Exam    Dr. Honeycutt [phone]  "

## 2021-01-11 NOTE — PROGRESS NOTES
" Current Eye Medications:  Systane balance daily both eyes, usually in evening. Warm compress every morning, fish oil twice a day.  MVI daily.     Subjective:  Complete eye exam. Vision comes and goes in distance both eyes, doing OK for most part. Uses OTC readers for near and that works OK. No eye pain or discomfort in either eye.   Had a headache that felt like it was going towards right eye on Saturday.  Headache happened while cross country skiing this weekend for first time.     Objective:  See Ophthalmology Exam.       Assessment:  Stable eye exam.      ICD-10-CM    1. Pseudophakia, ou; Yag Caps, os  Z96.1    2. Early dry stage nonexudative age-related macular degeneration of both eyes  H35.3131    3. Glaucoma suspect of both eyes  H40.003    4. Posterior vitreous detachment of both eyes  H43.813    5. Encounter for examination of eyes and vision with abnormal findings  Z01.01    6. Presbyopia  H52.4 EYE EXAM (SIMPLE-NONBILLABLE)     REFRACTION        Plan:  Glasses Rx given - optional  Use artificial tears up to 4 times daily both eyes as needed.  (Refresh Tears, Systane Ultra/Balance, or Theratears)   Possible clouding of posterior capsule right eye discussed.   Take a multiple vitamin or \"eye vitamin\" daily (AREDS2).  Protect your eyes outdoors from ultraviolet rays with sunglasses and/or brimmed hat.  Have spinach (cooked or raw), colorful fruits, walnuts, hazelnuts, almonds in your diet.  Monitor the vision in each eye weekly - call if any sudden persistent changes.  Call in October 2021 for an appointment in January 2022 for Complete Exam    Dr. Honeycutt [phone]         "

## 2021-01-11 NOTE — LETTER
"    1/11/2021         RE: Taqueria Bone  [address] [facility]  [place] [address]        Dear Colleague,    Thank you for referring your patient, Taqueria Bone, to the [facility]. Please see a copy of my visit note below.     Current Eye Medications:  Systane balance daily both eyes, usually in evening. Warm compress every morning, fish oil twice a day.  MVI daily.     Subjective:  Complete eye exam. Vision comes and goes in distance both eyes, doing OK for most part. Uses OTC readers for near and that works OK. No eye pain or discomfort in either eye.   Had a headache that felt like it was going towards right eye on Saturday.  Headache happened while cross country skiing this weekend for first time.     Objective:  See Ophthalmology Exam.       Assessment:  Stable eye exam.      ICD-10-CM    1. Pseudophakia, ou; Yag Caps, os  Z96.1    2. Early dry stage nonexudative age-related macular degeneration of both eyes  H35.3131    3. Glaucoma suspect of both eyes  H40.003    4. Posterior vitreous detachment of both eyes  H43.813    5. Encounter for examination of eyes and vision with abnormal findings  Z01.01    6. Presbyopia  H52.4 EYE EXAM (SIMPLE-NONBILLABLE)     REFRACTION        Plan:  Glasses Rx given - optional  Use artificial tears up to 4 times daily both eyes as needed.  (Refresh Tears, Systane Ultra/Balance, or Theratears)   Possible clouding of posterior capsule right eye discussed.   Take a multiple vitamin or \"eye vitamin\" daily (AREDS2).  Protect your eyes outdoors from ultraviolet rays with sunglasses and/or brimmed hat.  Have spinach (cooked or raw), colorful fruits, walnuts, hazelnuts, almonds in your diet.  Monitor the vision in each eye weekly - call if any sudden persistent changes.  Call in October 2021 for an appointment in January 2022 for Complete Exam    Dr. Honeycutt [phone]             Again, thank you for allowing me to participate in the care of your " patient.        Sincerely,        Ervin Honeycutt MD

## 2021-01-15 ENCOUNTER — HEALTH MAINTENANCE LETTER (OUTPATIENT)
Age: 76
End: 2021-01-15

## 2021-01-16 ENCOUNTER — MYC MEDICAL ADVICE (OUTPATIENT)
Dept: FAMILY MEDICINE | Facility: CLINIC | Age: 76
End: 2021-01-16

## 2021-01-16 PROBLEM — H43.813 POSTERIOR VITREOUS DETACHMENT OF BOTH EYES: Status: ACTIVE | Noted: 2021-01-16

## 2021-01-16 ASSESSMENT — SLIT LAMP EXAM - LIDS
COMMENTS: 2+ DERMATOCHALASIS
COMMENTS: 2+ DERMATOCHALASIS

## 2021-02-03 DIAGNOSIS — K21.9 GASTROESOPHAGEAL REFLUX DISEASE WITHOUT ESOPHAGITIS: ICD-10-CM

## 2021-02-03 RX ORDER — FAMOTIDINE 20 MG/1
20 TABLET, FILM COATED ORAL 2 TIMES DAILY
Qty: 180 TABLET | Refills: 0 | Status: SHIPPED | OUTPATIENT
Start: 2021-02-03 | End: 2021-02-26

## 2021-02-03 NOTE — TELEPHONE ENCOUNTER
Routing refill request to provider for review/approval because:  Patient needs to be seen because it has been more than 1 year since last office visit.  Dianne Sawant RN

## 2021-02-14 DIAGNOSIS — Z91.89 10 YEAR RISK OF MI OR STROKE 7.5% OR GREATER: ICD-10-CM

## 2021-02-16 RX ORDER — ATORVASTATIN CALCIUM 20 MG/1
TABLET, FILM COATED ORAL
Qty: 90 TABLET | Refills: 0 | Status: SHIPPED | OUTPATIENT
Start: 2021-02-16 | End: 2021-05-17

## 2021-02-16 NOTE — TELEPHONE ENCOUNTER
Routing refill request to provider for review/approval because:  Overdue for an appointment.   Labs not current:    Lab Results   Component Value Date    CHOL 132 12/17/2019     Lab Results   Component Value Date    HDL 54 12/17/2019     Lab Results   Component Value Date    LDL 54 12/17/2019     Lab Results   Component Value Date    TRIG 121 12/17/2019     Lab Results   Component Value Date    CHOLHDLRATIO 3.2 06/20/2014     Dianne Sawant RN

## 2021-02-26 ENCOUNTER — MYC MEDICAL ADVICE (OUTPATIENT)
Dept: FAMILY MEDICINE | Facility: CLINIC | Age: 76
End: 2021-02-26

## 2021-02-26 DIAGNOSIS — K21.9 GASTROESOPHAGEAL REFLUX DISEASE WITHOUT ESOPHAGITIS: ICD-10-CM

## 2021-02-26 RX ORDER — FAMOTIDINE 20 MG/1
20 TABLET, FILM COATED ORAL 2 TIMES DAILY
Qty: 180 TABLET | Refills: 0 | Status: SHIPPED | OUTPATIENT
Start: 2021-02-26 | End: 2021-05-03

## 2021-02-27 ENCOUNTER — MYC MEDICAL ADVICE (OUTPATIENT)
Dept: FAMILY MEDICINE | Facility: CLINIC | Age: 76
End: 2021-02-27

## 2021-02-27 DIAGNOSIS — I10 ESSENTIAL HYPERTENSION WITH GOAL BLOOD PRESSURE LESS THAN 140/90: ICD-10-CM

## 2021-03-01 RX ORDER — AMLODIPINE BESYLATE 5 MG/1
TABLET ORAL
Qty: 180 TABLET | Refills: 1 | Status: SHIPPED | OUTPATIENT
Start: 2021-03-01 | End: 2021-06-21

## 2021-03-01 NOTE — TELEPHONE ENCOUNTER
Amlodipine refill request  Virtual visit TIFFANY Hopson for vertigo 9/1/20  Last OV Dr. Myron Bob was 12/9/19 for medicare wellness exam.  BP Readings from Last 3 Encounters:   12/09/19 125/72   07/15/19 130/82   01/08/19 126/85     To MD to advise; failed RN refill protocol.  Overdue for appointment.

## 2021-04-18 DIAGNOSIS — R09.81 SINUS CONGESTION: ICD-10-CM

## 2021-04-19 RX ORDER — FLUTICASONE PROPIONATE 50 MCG
SPRAY, SUSPENSION (ML) NASAL
Qty: 48 G | Refills: 1 | Status: SHIPPED | OUTPATIENT
Start: 2021-04-19 | End: 2021-07-30

## 2021-05-03 DIAGNOSIS — K21.9 GASTROESOPHAGEAL REFLUX DISEASE WITHOUT ESOPHAGITIS: ICD-10-CM

## 2021-05-03 RX ORDER — FAMOTIDINE 20 MG/1
TABLET, FILM COATED ORAL
Qty: 60 TABLET | Refills: 0 | Status: SHIPPED | OUTPATIENT
Start: 2021-05-03 | End: 2021-07-30

## 2021-05-03 NOTE — TELEPHONE ENCOUNTER
Last OV with Dr. Bob was 12/9/2019.  Refilled x 1 month per protocol.  Patient needs to be seen for further refills. Thank you. Nisha HONEYCUTT - Please send letter to pt needs to been seen for further refills  Thank you

## 2021-05-03 NOTE — LETTER
May 3, 2021    Taqueria Bone  [address] [facility]  [place] [address]    Dear Taqueria,       We recently received a refill request for famotidine (PEPCID) 20 MG tablet.  We have refilled this for a one time 30 day supply only because you are due for a:    Medication check office visit      Please call at your earliest convenience so that there will not be a delay with your future refills.          Thank you,   Your [facility] Team/  [phone]

## 2021-05-04 ENCOUNTER — TRANSFERRED RECORDS (OUTPATIENT)
Dept: HEALTH INFORMATION MANAGEMENT | Facility: CLINIC | Age: 76
End: 2021-05-04

## 2021-05-15 DIAGNOSIS — Z91.89 10 YEAR RISK OF MI OR STROKE 7.5% OR GREATER: ICD-10-CM

## 2021-05-17 RX ORDER — ATORVASTATIN CALCIUM 20 MG/1
TABLET, FILM COATED ORAL
Qty: 90 TABLET | Refills: 3 | Status: SHIPPED | OUTPATIENT
Start: 2021-05-17 | End: 2022-05-12

## 2021-05-17 NOTE — TELEPHONE ENCOUNTER
Routing refill request to provider for review/approval because:  Pricilla given x1 and patient did not follow up, please advise  Labs not current:    Lab Results   Component Value Date    CHOL 132 12/17/2019     Lab Results   Component Value Date    HDL 54 12/17/2019     Lab Results   Component Value Date    LDL 54 12/17/2019     Lab Results   Component Value Date    TRIG 121 12/17/2019     Lab Results   Component Value Date    CHOLHDLRATIO 3.2 06/20/2014     Dianne Sawant RN

## 2021-06-20 DIAGNOSIS — I10 ESSENTIAL HYPERTENSION WITH GOAL BLOOD PRESSURE LESS THAN 140/90: ICD-10-CM

## 2021-06-21 RX ORDER — AMLODIPINE BESYLATE 5 MG/1
TABLET ORAL
Qty: 180 TABLET | Refills: 0 | Status: SHIPPED | OUTPATIENT
Start: 2021-06-21 | End: 2021-07-30

## 2021-06-21 NOTE — TELEPHONE ENCOUNTER
Routing refill request to provider for review/approval because:  Pricilla given x1 and patient did not follow up, please advise  Meliza Ellison BSN, RN

## 2021-07-07 ENCOUNTER — TRANSFERRED RECORDS (OUTPATIENT)
Dept: HEALTH INFORMATION MANAGEMENT | Facility: CLINIC | Age: 76
End: 2021-07-07

## 2021-07-26 ENCOUNTER — DOCUMENTATION ONLY (OUTPATIENT)
Dept: LAB | Facility: CLINIC | Age: 76
End: 2021-07-26

## 2021-07-26 DIAGNOSIS — R73.02 IGT (IMPAIRED GLUCOSE TOLERANCE): Primary | ICD-10-CM

## 2021-07-26 DIAGNOSIS — N40.1 BENIGN PROSTATIC HYPERPLASIA WITH NOCTURIA: ICD-10-CM

## 2021-07-26 DIAGNOSIS — E78.5 DYSLIPIDEMIA: ICD-10-CM

## 2021-07-26 DIAGNOSIS — Z13.220 SCREENING CHOLESTEROL LEVEL: ICD-10-CM

## 2021-07-26 DIAGNOSIS — Z91.89 10 YEAR RISK OF MI OR STROKE 7.5% OR GREATER: ICD-10-CM

## 2021-07-26 DIAGNOSIS — R35.1 BENIGN PROSTATIC HYPERPLASIA WITH NOCTURIA: ICD-10-CM

## 2021-07-30 ENCOUNTER — LAB (OUTPATIENT)
Dept: LAB | Facility: CLINIC | Age: 76
End: 2021-07-30
Payer: MEDICARE

## 2021-07-30 ENCOUNTER — OFFICE VISIT (OUTPATIENT)
Dept: FAMILY MEDICINE | Facility: CLINIC | Age: 76
End: 2021-07-30
Payer: MEDICARE

## 2021-07-30 VITALS
SYSTOLIC BLOOD PRESSURE: 127 MMHG | WEIGHT: 183 LBS | HEIGHT: 68 IN | TEMPERATURE: 97.7 F | DIASTOLIC BLOOD PRESSURE: 72 MMHG | HEART RATE: 94 BPM | OXYGEN SATURATION: 97 % | BODY MASS INDEX: 27.74 KG/M2

## 2021-07-30 DIAGNOSIS — R09.81 SINUS CONGESTION: ICD-10-CM

## 2021-07-30 DIAGNOSIS — I10 ESSENTIAL HYPERTENSION WITH GOAL BLOOD PRESSURE LESS THAN 140/90: ICD-10-CM

## 2021-07-30 DIAGNOSIS — R73.02 IGT (IMPAIRED GLUCOSE TOLERANCE): ICD-10-CM

## 2021-07-30 DIAGNOSIS — Z00.00 ENCOUNTER FOR MEDICARE ANNUAL WELLNESS EXAM: Primary | ICD-10-CM

## 2021-07-30 DIAGNOSIS — Z91.89 10 YEAR RISK OF MI OR STROKE 7.5% OR GREATER: ICD-10-CM

## 2021-07-30 DIAGNOSIS — Z13.220 SCREENING CHOLESTEROL LEVEL: ICD-10-CM

## 2021-07-30 DIAGNOSIS — N40.1 BENIGN PROSTATIC HYPERPLASIA WITH NOCTURIA: ICD-10-CM

## 2021-07-30 DIAGNOSIS — E78.5 DYSLIPIDEMIA: ICD-10-CM

## 2021-07-30 DIAGNOSIS — R35.1 BENIGN PROSTATIC HYPERPLASIA WITH NOCTURIA: ICD-10-CM

## 2021-07-30 DIAGNOSIS — N52.9 ERECTILE DYSFUNCTION, UNSPECIFIED ERECTILE DYSFUNCTION TYPE: ICD-10-CM

## 2021-07-30 LAB
ALBUMIN SERPL-MCNC: 3.9 G/DL (ref 3.4–5)
ALP SERPL-CCNC: 106 U/L (ref 40–150)
ALT SERPL W P-5'-P-CCNC: 48 U/L (ref 0–70)
ANION GAP SERPL CALCULATED.3IONS-SCNC: <1 MMOL/L (ref 3–14)
AST SERPL W P-5'-P-CCNC: 24 U/L (ref 0–45)
BILIRUB SERPL-MCNC: 0.7 MG/DL (ref 0.2–1.3)
BUN SERPL-MCNC: 17 MG/DL (ref 7–30)
CALCIUM SERPL-MCNC: 9.6 MG/DL (ref 8.5–10.1)
CHLORIDE BLD-SCNC: 105 MMOL/L (ref 94–109)
CHOLEST SERPL-MCNC: 140 MG/DL
CO2 SERPL-SCNC: 35 MMOL/L (ref 20–32)
CREAT SERPL-MCNC: 0.82 MG/DL (ref 0.66–1.25)
FASTING STATUS PATIENT QL REPORTED: YES
GFR SERPL CREATININE-BSD FRML MDRD: 87 ML/MIN/1.73M2
GLUCOSE BLD-MCNC: 103 MG/DL (ref 70–99)
HDLC SERPL-MCNC: 58 MG/DL
LDLC SERPL CALC-MCNC: 68 MG/DL
NONHDLC SERPL-MCNC: 82 MG/DL
POTASSIUM BLD-SCNC: 3.6 MMOL/L (ref 3.4–5.3)
PROT SERPL-MCNC: 7 G/DL (ref 6.8–8.8)
SODIUM SERPL-SCNC: 140 MMOL/L (ref 133–144)
TRIGL SERPL-MCNC: 72 MG/DL

## 2021-07-30 PROCEDURE — 80053 COMPREHEN METABOLIC PANEL: CPT

## 2021-07-30 PROCEDURE — 80061 LIPID PANEL: CPT

## 2021-07-30 PROCEDURE — G0439 PPPS, SUBSEQ VISIT: HCPCS | Performed by: FAMILY MEDICINE

## 2021-07-30 PROCEDURE — 36415 COLL VENOUS BLD VENIPUNCTURE: CPT

## 2021-07-30 RX ORDER — FLUTICASONE PROPIONATE 50 MCG
SPRAY, SUSPENSION (ML) NASAL
Qty: 48 G | Refills: 3 | Status: SHIPPED | OUTPATIENT
Start: 2021-07-30 | End: 2022-09-20

## 2021-07-30 RX ORDER — AMLODIPINE BESYLATE 5 MG/1
TABLET ORAL
Qty: 180 TABLET | Refills: 3 | Status: SHIPPED | OUTPATIENT
Start: 2021-07-30 | End: 2021-12-09

## 2021-07-30 RX ORDER — NICOTINE POLACRILEX 4 MG/1
20 GUM, CHEWING ORAL DAILY
COMMUNITY

## 2021-07-30 RX ORDER — SILDENAFIL CITRATE 20 MG/1
TABLET ORAL
Qty: 30 TABLET | Refills: 11 | Status: SHIPPED | OUTPATIENT
Start: 2021-07-30 | End: 2023-05-11

## 2021-07-30 ASSESSMENT — ENCOUNTER SYMPTOMS
PARESTHESIAS: 0
WEAKNESS: 0
HEMATOCHEZIA: 0
DYSURIA: 0
FEVER: 0
NERVOUS/ANXIOUS: 0
SORE THROAT: 1
CONSTIPATION: 0
CHILLS: 0
HEARTBURN: 1
SHORTNESS OF BREATH: 0
FREQUENCY: 1
HEMATURIA: 0
COUGH: 1
DIZZINESS: 1
DIARRHEA: 1
NAUSEA: 0
HEADACHES: 0
PALPITATIONS: 0
JOINT SWELLING: 0
MYALGIAS: 1
ABDOMINAL PAIN: 0
ARTHRALGIAS: 1
EYE PAIN: 0

## 2021-07-30 ASSESSMENT — MIFFLIN-ST. JEOR: SCORE: 1544.58

## 2021-07-30 ASSESSMENT — PAIN SCALES - GENERAL: PAINLEVEL: NO PAIN (0)

## 2021-07-30 ASSESSMENT — ACTIVITIES OF DAILY LIVING (ADL): CURRENT_FUNCTION: NO ASSISTANCE NEEDED

## 2021-07-30 NOTE — NURSING NOTE
"Chief Complaint   Patient presents with     Wellness Visit       Initial BP (!) 162/98   Pulse 94   Temp 97.7  F (36.5  C) (Tympanic)   Ht 1.727 m (5' 8\")   Wt 83 kg (183 lb)   SpO2 97%   BMI 27.83 kg/m   Estimated body mass index is 27.83 kg/m  as calculated from the following:    Height as of this encounter: 1.727 m (5' 8\").    Weight as of this encounter: 83 kg (183 lb).  Medication Reconciliation: complete  Gianna Alejandro CMA  "

## 2021-07-30 NOTE — PROGRESS NOTES
"SUBJECTIVE:   Taqueria Bone is a 75 year old male who presents for Preventive Visit.      Patient has been advised of split billing requirements and indicates understanding: Yes   Are you in the first 12 months of your Medicare coverage?  No    Healthy Habits:     In general, how would you rate your overall health?  Good    Frequency of exercise:  4-5 days/week    Duration of exercise:  45-60 minutes    Do you usually eat at least 4 servings of fruit and vegetables a day, include whole grains    & fiber and avoid regularly eating high fat or \"junk\" foods?  Yes    Taking medications regularly:  Yes    Medication side effects:  Not applicable    Ability to successfully perform activities of daily living:  No assistance needed    Home Safety:  No safety concerns identified    Hearing Impairment:  Difficulty following a conversation in a noisy restaurant or crowded room, need to ask people to speak up or repeat themselves and difficulty understanding soft or whispered speech    In the past 6 months, have you been bothered by leaking of urine?  No    In general, how would you rate your overall mental or emotional health?  Good      PHQ-2 Total Score: 1    Additional concerns today:  No    Do you feel safe in your environment? Yes    Have you ever done Advance Care Planning? (For example, a Health Directive, POLST, or a discussion with a medical provider or your loved ones about your wishes): Yes, patient states has an Advance Care Planning document and will bring a copy to the clinic.       Fall risk  Fallen 2 or more times in the past year?: No  Any fall with injury in the past year?: No    Cognitive Screening   1) Repeat 3 items (Leader, Season, Table)    2) Clock draw: NORMAL  3) 3 item recall: Recalls 3 objects  Results: 3 items recalled: COGNITIVE IMPAIRMENT LESS LIKELY    Mini-CogTM Copyright [employer]. Licensed by the author for use in [facility]; reprinted with permission (albert@.[facility]). All " rights reserved.          Reviewed and updated as needed this visit by clinical staff  Tobacco  Allergies  Meds   Med Hx  Surg Hx  Fam Hx  Soc Hx        Reviewed and updated as needed this visit by Provider  Tobacco               Social History     Tobacco Use     Smoking status: Former Smoker     Packs/day: 0.50     Years: 18.00     Pack years: 9.00     Types: Cigarettes, Cigars, Pipe, Dip, chew, snus or snuff     Start date: 10/1/1961     Quit date: 10/31/1979     Years since quittin.7     Smokeless tobacco: Former User     Quit date: 10/1/1970     Tobacco comment:    Substance Use Topics     Alcohol use: Yes     Alcohol/week: 0.8 standard drinks     Comment: 1-2 beer daily         Alcohol Use 2021   Prescreen: >3 drinks/day or >7 drinks/week? No   Prescreen: >3 drinks/day or >7 drinks/week? -               Current providers sharing in care for this patient include:   Patient Care Team:  Myron Bob MD as PCP - General (Family Practice)  Ervin Honeycutt MD as Assigned Surgical Provider  Guadalupe Hopson PA-C as Assigned PCP    The following health maintenance items are reviewed in Epic and correct as of today:  Health Maintenance Due   Topic Date Due     URINE DRUG SCREEN  Never done     ANNUAL REVIEW OF HM ORDERS  Never done     FALL RISK ASSESSMENT  2020     BMP  2020     COVID-19 Vaccine (2 - Pfizer 2-dose series) 2021               Review of Systems   Constitutional: Negative for chills and fever.   HENT: Positive for ear pain, hearing loss and sore throat. Negative for congestion.    Eyes: Negative for pain and visual disturbance.   Respiratory: Positive for cough. Negative for shortness of breath.    Cardiovascular: Negative for chest pain, palpitations and peripheral edema.   Gastrointestinal: Positive for diarrhea and heartburn. Negative for abdominal pain, constipation, hematochezia and nausea.   Genitourinary: Positive for frequency, impotence and  "urgency. Negative for discharge, dysuria, genital sores and hematuria.   Musculoskeletal: Positive for arthralgias and myalgias. Negative for joint swelling.   Skin: Positive for rash.   Neurological: Positive for dizziness. Negative for weakness, headaches and paresthesias.   Psychiatric/Behavioral: Negative for mood changes. The patient is not nervous/anxious.          OBJECTIVE:   /72   Pulse 94   Temp 97.7  F (36.5  C) (Tympanic)   Ht 1.727 m (5' 8\")   Wt 83 kg (183 lb)   SpO2 97%   BMI 27.83 kg/m   Estimated body mass index is 27.83 kg/m  as calculated from the following:    Height as of this encounter: 1.727 m (5' 8\").    Weight as of this encounter: 83 kg (183 lb).  Physical Exam          ASSESSMENT / PLAN:       ICD-10-CM    1. Encounter for Medicare annual wellness exam  Z00.00    2. Essential hypertension with goal blood pressure less than 140/90  I10 amLODIPine (NORVASC) 5 MG tablet   3. Sinus congestion  R09.81 fluticasone (FLONASE) 50 MCG/ACT nasal spray   4. Erectile dysfunction, unspecified erectile dysfunction type  N52.9 sildenafil (REVATIO) 20 MG tablet       Patient has been advised of split billing requirements and indicates understanding: Yes  COUNSELING:  Reviewed preventive health counseling, as reflected in patient instructions       Regular exercise       Healthy diet/nutrition       Vision screening       Dental care       Hepatitis C screening       HIV screening for high risk patient       Colon cancer screening    Estimated body mass index is 27.83 kg/m  as calculated from the following:    Height as of this encounter: 1.727 m (5' 8\").    Weight as of this encounter: 83 kg (183 lb).        He reports that he quit smoking about 41 years ago. His smoking use included cigarettes, cigars, pipe, and dip, chew, snus or snuff. He started smoking about 59 years ago. He has a 9.00 pack-year smoking history. He quit smokeless tobacco use about 50 years ago.      Appropriate preventive " services were discussed with this patient, including applicable screening as appropriate for cardiovascular disease, diabetes, osteopenia/osteoporosis, and glaucoma.  As appropriate for age/gender, discussed screening for colorectal cancer, prostate cancer, breast cancer, and cervical cancer. Checklist reviewing preventive services available has been given to the patient.    Reviewed patients plan of care and provided an AVS. The Basic Care Plan (routine screening as documented in Health Maintenance) for Taqueria meets the Care Plan requirement. This Care Plan has been established and reviewed with the Patient.    Counseling Resources:  ATP IV Guidelines  Pooled Cohorts Equation Calculator  Breast Cancer Risk Calculator  Breast Cancer: Medication to Reduce Risk  FRAX Risk Assessment  ICSI Preventive Guidelines  Dietary Guidelines for Americans, 2010  [employer]'s MyPlate  ASA Prophylaxis  Lung CA Screening    yMron Bob MD  [facility]    Identified Health Risks:  --------------------------------------------------------------------------------------------------------------------------------------  SUBJECTIVE:  Taqueria Bone is a 75 year old male who presents to the clinic today for a routine physical exam.    The patient's last physical was 1.5 years ago.     Cholesterol   Date Value Ref Range Status   12/17/2019 132 <200 mg/dL Final   09/26/2018 188 <200 mg/dL Final     HDL Cholesterol   Date Value Ref Range Status   12/17/2019 54 >39 mg/dL Final   09/26/2018 55 >39 mg/dL Final     LDL Cholesterol Calculated   Date Value Ref Range Status   12/17/2019 54 <100 mg/dL Final     Comment:     Desirable:       <100 mg/dl   09/26/2018 109 (H) <100 mg/dL Final     Comment:     Above desirable:  100-129 mg/dl  Borderline High:  130-159 mg/dL  High:             160-189 mg/dL  Very high:       >189 mg/dl       Triglycerides   Date Value Ref Range Status   12/17/2019 121 <150 mg/dL Final     Comment:      Fasting specimen   09/26/2018 118 <150 mg/dL Final     Comment:     Fasting specimen     Cholesterol/HDL Ratio   Date Value Ref Range Status   06/20/2014 3.2 0.0 - 5.0 Final   01/09/2012 4.3 0.0 - 5.0 Final     The patient's last fasting lipid panel was done this am but the result(s) are not available yet        The 10-year ASCVD risk score (Jack ESTRADA Jr., et al., 2013) is: 22.4%    Values used to calculate the score:      Age: 75 years      Sex: Male      Is Non- : No      Diabetic: No      Tobacco smoker: No      Systolic Blood Pressure: 127 mmHg      Is BP treated: Yes      HDL Cholesterol: 54 mg/dL      Total Cholesterol: 132 mg/dL      Risk Enhancers:  Family history of premature ASCVD- No  LDL >159- Unknown  Chronic kidney disease- No  Metabolic Syndrome- No  Premature menopause- No  Inflammatory conditions (RA, psoriasis, HIV)- No  SE  Ancestry- No  Triglycerides >174- Unknown      The patient reports that he has been treated for high blood pressure.    The patient reports that he does not take a daily aspirin.    Lab Results   Component Value Date    HCVAB Negative 07/22/2009     The patient reports that he has been screened for Hepatitis C    (Screen all baby boomers once per CDC-- the generation born from 1945 through 1965 and per USPTF screen age 19 to 79 especially younger people who have used IV drugs)  He would not like to have an Hepatitis C test today    No results found for: HIAGAB  The patient reports that he has not been screened for HIV   (Screen all 15 to 64 years old)  He would not like to have an HIV test today      Immunization History   Administered Date(s) Administered     COVID-19,PF,Pfizer 03/08/2021     Influenza (H1N1) 01/07/2010     Influenza (High Dose) 3 valent vaccine 10/25/2011, 11/23/2015, 12/05/2016, 10/15/2017, 10/03/2018, 10/05/2019     Influenza (IIV3) PF 12/01/2001, 10/16/2002, 11/02/2005, 10/01/2008, 09/23/2009, 09/28/2010, 10/12/2012,  10/20/2013, 10/01/2018     Influenza, Quad, High Dose, Pf, 65yr + 09/26/2020     LYMERIX 03/23/1999, 04/22/1999, 06/02/2000     Mantoux Tuberculin Skin Test 02/15/2005     Pneumo Conj 13-V (2010&after) 11/23/2015     Pneumococcal 23 valent 11/02/2005, 10/25/2011, 01/01/2014     TD (ADULT, 7+) 12/16/1997     Tdap (Adacel,Boostrix) 11/13/2008, 10/03/2018     Twinrix A/B 01/30/2012, 03/05/2012, 11/13/2012     Zoster vaccine recombinant adjuvanted (SHINGRIX) 10/03/2018, 12/03/2018     Zoster vaccine, live 01/16/2012     The patient's believes that his last tetanus shot was given 3 year(s) ago.   The patient believes that he has had a Shingrix in the past  The patient believes that he has had a PPSV23 in the past.  The patient believes that he has had a PCV13 in the past.  The patient believes that he has had a seasonal flu vaccination this fall or winter.  The patient would like to have a no vaccinations today      No results found for this or any previous visit.]   The patient reports a family history of colon cancer.  The patient reports that he has had a colonoscopy. His  last colonoscopy was in October 2017 and he  report that is was abnormal. The patient was told to have this repeated in 5 years.    The patient reports that he and his wife or partner are not using contraception as she has gone through menopause.  His currently used contraception is  a vasectomy in the near future.  The patient reports a family history of diabetes in his father and mother.    The patient reports that he eats or drinks 2 servings of dairy products per day. He does take a multivitamin with calcium once daily.  The patient reports that he has dental appointments approximately every 9 months.  The patient reports that he  has an eye examination approximately every 1.0 year(s).    Do you currently smoke? No, quit in 1979  How many years have you smoked? 0   How many packs per day did you smoke on average? N/A  (if more than 30 pack year  history and the patient is age 55-80 consider ordering an annual low dose radiation lung CT to screen for cancer)  (Do not order if patient has quit more than 15 years ago or has a health condition that limits life expectancy or could not tolerate curative lung surgery)  Are you interested having a lung CT to screen for lung cancer? N/A    If the patient has smoked more that 100 cigarettes, has the patient had an imaging study (US or CT) for an AAA between the ages of 65 and 75? Yes: 2017            Patient Active Problem List   Diagnosis     Anxiety     Chronic nonallergic rhinitis     CARDIOVASCULAR SCREENING; LDL GOAL LESS THAN 130     Advanced directives, counseling/discussion     Insomnia     Medial epicondylitis     Episodic recurrent vertigo     BPH (benign prostatic hyperplasia)     Seasonal allergic rhinitis     Essential hypertension with goal blood pressure less than 140/90     Chronic low back pain     Right knee pain     Personal history of tobacco use, presenting hazards to health     10 year risk of MI or stroke 7.5% or greater, 20.7% in October 2018     Adenomatous polyp of colon, unspecified part of colon     Gastroesophageal reflux disease without esophagitis     IGT (impaired glucose tolerance)     Benign prostatic hyperplasia with nocturia     Pseudophakia, ou; Yag Caps, os     Glaucoma suspect of both eyes     Macular pigment deposit, od     Sales's esophagus without dysplasia     Early dry stage nonexudative age-related macular degeneration of both eyes     Posterior vitreous detachment of both eyes       Past Surgical History:   Procedure Laterality Date     ------------OTHER-------------  7/14/10    YAG CAPSULOTOMY OS     ARTHROSCOPY KNEE Right 12/16/2016    Procedure: ARTHROSCOPY KNEE;  Surgeon: Jose Stone MD;  Location: [facility] OR     BIOPSY      taken from the left heel     C SKIN TISSUE PROCEDURE UNLISTED      cysts removed neck / tailbone / back     CATARACT IOL, RT/LT        COLONOSCOPY  3-30-12    Return in 5 yrs.      ENDOSCOPY  3-10-11     HEAD & NECK SURGERY  late     cyst removal on the back of the neck     HERNIA REPAIR  spring of 1992     REPAIR PTOSIS       TONSILLECTOMY & ADENOIDECTOMY         Family History   Problem Relation Age of Onset     Cancer Father         colon, spread to liver     Diabetes Father      Glaucoma Father      Diabetes Mother      Hypertension Mother      Thyroid Disease Mother      Heart Disease Mother 91        MI      Cerebrovascular Disease Maternal Grandfather      Diabetes Paternal Grandfather      Cerebrovascular Disease Paternal Grandfather      Cerebrovascular Disease Maternal Grandmother      Hypertension Brother      Macular Degeneration Paternal Uncle        Social History     Socioeconomic History     Marital status:      Spouse name: Not on file     Number of children: Not on file     Years of education: Not on file     Highest education level: Not on file   Occupational History     Not on file   Tobacco Use     Smoking status: Former Smoker     Packs/day: 0.50     Years: 18.00     Pack years: 9.00     Types: Cigarettes, Cigars, Pipe, Dip, chew, snus or snuff     Start date: 10/1/1961     Quit date: 10/31/1979     Years since quittin.7     Smokeless tobacco: Former User     Quit date: 10/1/1970     Tobacco comment:    Substance and Sexual Activity     Alcohol use: Yes     Alcohol/week: 0.8 standard drinks     Comment: 1-2 beer daily     Drug use: No     Sexual activity: Yes     Partners: Female     Birth control/protection: Male Surgical   Other Topics Concern     Parent/sibling w/ CABG, MI or angioplasty before 65F 55M? No   Social History Narrative     Not on file     Social Determinants of Health     Financial Resource Strain:      Difficulty of Paying Living Expenses:    Food Insecurity:      Worried About Running Out of Food in the Last Year:      Ran Out of Food in the Last Year:    Transportation Needs:      Lack  of Transportation (Medical):      Lack of Transportation (Non-Medical):    Physical Activity:      Days of Exercise per Week:      Minutes of Exercise per Session:    Stress:      Feeling of Stress :    Social Connections:      Frequency of Communication with Friends and Family:      Frequency of Social Gatherings with Friends and Family:      Attends Anabaptism Services:      Active Member of Clubs or Organizations:      Attends Club or Organization Meetings:      Marital Status:    Intimate Partner Violence:      Fear of Current or Ex-Partner:      Emotionally Abused:      Physically Abused:      Sexually Abused:        Current Outpatient Medications   Medication Sig Dispense Refill     acetaminophen (TYLENOL) 325 MG tablet Take 325-650 mg by mouth daily       amLODIPine (NORVASC) 5 MG tablet TAKE 2 TABLETS DAILY (NEED APPOINTMENT FOR FURTHER REFILLS) 180 tablet 0     Artificial Tear Ointment (ARTIFICIAL TEARS) OINT 4 x a day as needed and at bedtime. 1 Tube 0     atorvastatin (LIPITOR) 20 MG tablet TAKE 1 TABLET AT BEDTIME (DUE FOR FASTING LAB APPOINTMENT AND APPOINTMENT WITH PROVIDER FOR FURTHER REFILLS) 90 tablet 3     cetirizine (ZYRTEC) 10 MG tablet Take 5 mg by mouth 2 times daily Taking 1/2 tablet once daily       FISH OIL 1 capsule.       fluticasone (FLONASE) 50 MCG/ACT nasal spray USE 1 SPRAY IN EACH NOSTRIL TWICE A DAY 48 g 1     GLUCOS-AMANDA-MSM-II-N-CGAJ-SECU PO TABS one daily       LORazepam (ATIVAN) 0.5 MG tablet Take 1 tablet (0.5 mg) by mouth daily as needed for anxiety 12 tablet 0     meclizine (ANTIVERT) 25 MG tablet Take 0.5-1 tablets (12.5-25 mg) by mouth 3 times daily as needed for dizziness 30 tablet 1     meclizine (ANTIVERT) 25 MG tablet Take 1 tablet (25 mg) by mouth 3 times daily as needed 30 tablet 1     MULTIVITAMIN TABS   OR 1 tablet daily       naproxen sodium (ALEVE) 220 MG capsule Take 220 mg by mouth daily       omeprazole 20 MG tablet Take 20 mg by mouth daily       Propylene  "Glycol (SYSTANE BALANCE OP) Apply 1 drop to eye daily as needed       PROSTATE PO QD       ranitidine (ZANTAC) 150 MG tablet Take 1 tablet (150 mg) by mouth 2 times daily 180 tablet 3     triamcinolone (KENALOG) 0.1 % cream Apply sparingly to affected area three times daily for 14 days. 30 g 1       Review Of Systems    Genitourinary: negative and nocturia x 1-4      PHYSICAL EXAMINATION:  Blood pressure 127/72, pulse 94, temperature 97.7  F (36.5  C), temperature source Tympanic, height 1.727 m (5' 8\"), weight 83 kg (183 lb), SpO2 97 %.  General appearance - healthy, alert and no distress  Skin - Skin color, texture, turgor normal. No rashes or lesions.  Head - Normocephalic. No masses, lesions, tenderness or abnormalities  Eyes - conjunctivae/corneas clear. PERRL, EOM's intact. Fundi benign  Ears - External ears normal. Canals clear. TM's normal.  Nose/Sinuses - Nares normal. Septum midline. Mucosa normal. No drainage or sinus tenderness.  Oropharynx - Lips, mucosa, and tongue normal. Teeth and gums normal.  Neck - Neck supple. No adenopathy. Thyroid symmetric, normal size,  Lungs - Percussion normal. Good diaphragmatic excursion. Lungs clear  Heart - PMI normal. No lifts, heaves, or thrills. RRR. No murmurs, clicks gallops or rub  Abdomen - Abdomen soft, non-tender. BS normal. No masses, organomegaly  Extremities - Extremities normal. No deformities, edema, or skin discoloration.  Musculoskeletal - Spine ROM normal. Muscular strength intact.  Peripheral pulses - radial=4/4, femoral=4/4, popliteal=4/4, dorsalis pedis=4/4,  Neuro - Gait normal. Reflexes normal and symmetric. Sensation grossly WNL.  Genitalia - Penis normal. No urethral discharge. Scrotum normal to palpation. No hernia.  Rectal - Rectal negative. Prostate palpation negative.  No rectal masses or abnormalities, positive findings: prostate 2+      Orders Only on 12/17/2019   Component Date Value Ref Range Status     25 OH Vit D2 12/17/2019 <5  ug/L " Final     25 OH Vit D3 12/17/2019 43  ug/L Final     25 OH Vit D total 12/17/2019 <48  20 - 75 ug/L Final    Comment: Season, race, dietary intake, and treatment affect the concentration of   25-hydroxy-Vitamin D. Values may decrease during winter months and increase   during summer months. Values 20-29 ug/L may indicate Vitamin D insufficiency   and values <20 ug/L may indicate Vitamin D deficiency.  This test was developed and its performance characteristics determined by the   [facility],  [facility]. It has   not been cleared or approved by the FDA. The laboratory is regulated under   CLIA as qualified to perform high-complexity testing. This test is used for   clinical purposes. It should not be regarded as investigational or for   research.       Sodium 12/17/2019 143  133 - 144 mmol/L Final     Potassium 12/17/2019 4.2  3.4 - 5.3 mmol/L Final     Chloride 12/17/2019 105  94 - 109 mmol/L Final     Carbon Dioxide 12/17/2019 34* 20 - 32 mmol/L Final     Anion Gap 12/17/2019 4  3 - 14 mmol/L Final     Glucose 12/17/2019 108* 70 - 99 mg/dL Final    Fasting specimen     Urea Nitrogen 12/17/2019 14  7 - 30 mg/dL Final     Creatinine 12/17/2019 0.87  0.66 - 1.25 mg/dL Final     GFR Estimate 12/17/2019 85  >60 mL/min/[1.73_m2] Final    Comment: Non  GFR Calc  Starting 12/18/2018, serum creatinine based estimated GFR (eGFR) will be   calculated using the Chronic Kidney Disease Epidemiology Collaboration   (CKD-EPI) equation.       GFR Estimate If Black 12/17/2019 >90  >60 mL/min/[1.73_m2] Final    Comment:  GFR Calc  Starting 12/18/2018, serum creatinine based estimated GFR (eGFR) will be   calculated using the Chronic Kidney Disease Epidemiology Collaboration   (CKD-EPI) equation.       Calcium 12/17/2019 9.8  8.5 - 10.1 mg/dL Final     Bilirubin Total 12/17/2019 0.6  0.2 - 1.3 mg/dL Final     Albumin 12/17/2019 4.1  3.4 - 5.0 g/dL Final      Protein Total 12/17/2019 7.2  6.8 - 8.8 g/dL Final     Alkaline Phosphatase 12/17/2019 100  40 - 150 U/L Final     ALT 12/17/2019 35  0 - 70 U/L Final     AST 12/17/2019 21  0 - 45 U/L Final     Cholesterol 12/17/2019 132  <200 mg/dL Final     Triglycerides 12/17/2019 121  <150 mg/dL Final    Fasting specimen     HDL Cholesterol 12/17/2019 54  >39 mg/dL Final     LDL Cholesterol Calculated 12/17/2019 54  <100 mg/dL Final    Desirable:       <100 mg/dl     Non HDL Cholesterol 12/17/2019 78  <130 mg/dL Final       ASSESSMENT:    ICD-10-CM    1. Encounter for Medicare annual wellness exam  Z00.00        Well-Adult Physical Exam.  Health Maintenance Due   Topic Date Due     URINE DRUG SCREEN  Never done     ANNUAL REVIEW OF HM ORDERS  Never done     FALL RISK ASSESSMENT  12/09/2020     BMP  12/17/2020     COVID-19 Vaccine (2 - Pfizer 2-dose series) 03/29/2021     Health Maintenance   Topic Date Due     URINE DRUG SCREEN  Never done     ANNUAL REVIEW OF HM ORDERS  Never done     FALL RISK ASSESSMENT  12/09/2020     BMP  12/17/2020     COVID-19 Vaccine (2 - Pfizer 2-dose series) 03/29/2021     INFLUENZA VACCINE (1) 09/01/2021     MEDICARE ANNUAL WELLNESS VISIT  07/30/2022     COLORECTAL CANCER SCREENING  10/09/2022     ADVANCE CARE PLANNING  12/09/2024     DTAP/TDAP/TD IMMUNIZATION (4 - Td or Tdap) 10/03/2028     HEPATITIS C SCREENING  Completed     PHQ-2  Completed     Pneumococcal Vaccine: 65+ Years  Completed     ZOSTER IMMUNIZATION  Completed     AORTIC ANEURYSM SCREENING (SYSTEM ASSIGNED)  Completed     IPV IMMUNIZATION  Aged Out     MENINGITIS IMMUNIZATION  Aged Out     HEPATITIS B IMMUNIZATION  Aged Out         HEALTH CARE MAINTENENCE: The recommended screening tests and vaccinatons for this patient have been discussed as above.  The appropriate tests and vaccinations  have been ordered or declined by the patient. Please see the orders in EPIC.The patient specifically declines: BPH treatment     Immunization  Status:  up to date and documented    Patient Active Problem List   Diagnosis     Anxiety     Chronic nonallergic rhinitis     CARDIOVASCULAR SCREENING; LDL GOAL LESS THAN 130     Advanced directives, counseling/discussion     Insomnia     Medial epicondylitis     Episodic recurrent vertigo     BPH (benign prostatic hyperplasia)     Seasonal allergic rhinitis     Essential hypertension with goal blood pressure less than 140/90     Chronic low back pain     Right knee pain     Personal history of tobacco use, presenting hazards to health     10 year risk of MI or stroke 7.5% or greater, 20.7% in October 2018     Adenomatous polyp of colon, unspecified part of colon     Gastroesophageal reflux disease without esophagitis     IGT (impaired glucose tolerance)     Benign prostatic hyperplasia with nocturia     Pseudophakia, ou; Yag Caps, os     Glaucoma suspect of both eyes     Macular pigment deposit, od     Sales's esophagus without dysplasia     Early dry stage nonexudative age-related macular degeneration of both eyes     Posterior vitreous detachment of both eyes        ATP III Guidelines  ICSI Preventive Guidelines    PLAN:   Check a fasting lipid profile  Colonoscopy recommended in 1 year   Check a fasting glucose  Discussed calcium intake, vitamins and supplements. Recommended 1000 mg of calcium daily  Sunscreen use was recommended especially in the area of tatoos  Refills on chronic medication given  Recommended dental exams every 6 months  Recommended eye exam every 1-2 years  Follow up in 1 year for the next preventative medical visit    The patients chronic medication was filled for 12  months.    Body mass index is 27.83 kg/m .

## 2021-07-30 NOTE — PATIENT INSTRUCTIONS
Patient Education   Personalized Prevention Plan  You are due for the preventive services outlined below.  Your care team is available to assist you in scheduling these services.  If you have already completed any of these items, please share that information with your care team to update in your medical record.  Health Maintenance Due   Topic Date Due     URINE DRUG SCREEN  Never done     ANNUAL REVIEW OF HM ORDERS  Never done     FALL RISK ASSESSMENT  12/09/2020     Basic Metabolic Panel  12/17/2020     PHQ-2  01/01/2021     COVID-19 Vaccine (2 - Pfizer 2-dose series) 03/29/2021

## 2021-08-23 DIAGNOSIS — R42 VERTIGO: ICD-10-CM

## 2021-08-24 RX ORDER — MECLIZINE HYDROCHLORIDE 25 MG/1
25 TABLET ORAL 3 TIMES DAILY PRN
Qty: 30 TABLET | Refills: 1 | Status: SHIPPED | OUTPATIENT
Start: 2021-08-24 | End: 2024-07-15

## 2021-08-24 NOTE — TELEPHONE ENCOUNTER
Meclizine refill request  Routing to PCP to advise as saw patient on 7/30/21 for wellness exam.  Last written prescription was 9/1/20, TIFFANY Hopson PA-C, for intermittent vertigo.  Catina Kohler RN

## 2021-09-11 ENCOUNTER — HEALTH MAINTENANCE LETTER (OUTPATIENT)
Age: 76
End: 2021-09-11

## 2021-12-08 ENCOUNTER — TELEPHONE (OUTPATIENT)
Dept: FAMILY MEDICINE | Facility: CLINIC | Age: 76
End: 2021-12-08
Payer: MEDICARE

## 2021-12-08 NOTE — TELEPHONE ENCOUNTER
Patient is calling stating that he is wondering why his rx for  amLODIPine Besylate was denied.  He states that he was just in to be seen 7/30/2021.  Please advise.  Thank you  Asha Kruse

## 2022-01-14 ENCOUNTER — OFFICE VISIT (OUTPATIENT)
Dept: OPHTHALMOLOGY | Facility: CLINIC | Age: 77
End: 2022-01-14
Payer: MEDICARE

## 2022-01-14 DIAGNOSIS — H52.4 PRESBYOPIA: ICD-10-CM

## 2022-01-14 DIAGNOSIS — H35.3131 EARLY DRY STAGE NONEXUDATIVE AGE-RELATED MACULAR DEGENERATION OF BOTH EYES: ICD-10-CM

## 2022-01-14 DIAGNOSIS — H40.003 GLAUCOMA SUSPECT OF BOTH EYES: ICD-10-CM

## 2022-01-14 DIAGNOSIS — H35.52 MACULAR PIGMENT DEPOSIT: ICD-10-CM

## 2022-01-14 DIAGNOSIS — Z96.1 PSEUDOPHAKIA: Primary | ICD-10-CM

## 2022-01-14 DIAGNOSIS — H43.813 POSTERIOR VITREOUS DETACHMENT OF BOTH EYES: ICD-10-CM

## 2022-01-14 DIAGNOSIS — Z01.01 ENCOUNTER FOR EXAMINATION OF EYES AND VISION WITH ABNORMAL FINDINGS: ICD-10-CM

## 2022-01-14 PROCEDURE — 92015 DETERMINE REFRACTIVE STATE: CPT | Mod: GY | Performed by: OPHTHALMOLOGY

## 2022-01-14 PROCEDURE — 92014 COMPRE OPH EXAM EST PT 1/>: CPT | Performed by: OPHTHALMOLOGY

## 2022-01-14 ASSESSMENT — CONF VISUAL FIELD
OD_NORMAL: 1
OS_NORMAL: 1

## 2022-01-14 ASSESSMENT — REFRACTION_WEARINGRX
OD_CYLINDER: +1.00
SPECS_TYPE: BIFOCAL
OS_CYLINDER: +1.00
OD_AXIS: 030
OS_CYLINDER: +1.00
OD_SPHERE: +0.25
OS_ADD: +3.00
OS_SPHERE: -2.00
OS_AXIS: 148
OD_SPHERE: +2.75
OD_ADD: +2.75
OS_AXIS: 132
OS_SPHERE: +1.25
SPECS_TYPE: SVL;RDNG
OD_AXIS: 026
OD_CYLINDER: +0.75

## 2022-01-14 ASSESSMENT — VISUAL ACUITY
OD_CC: 20/25
OS_CC: 1+
OD_CC: 1+
OD_CC+: -1
METHOD: SNELLEN - LINEAR
OS_CC: 20/25
CORRECTION_TYPE: GLASSES

## 2022-01-14 ASSESSMENT — REFRACTION_MANIFEST
OD_ADD: +2.75
OS_ADD: +2.75
OD_AXIS: 068
OD_CYLINDER: +0.50
OS_CYLINDER: +1.00
OS_AXIS: 150
OS_SPHERE: -2.25
OD_SPHERE: -0.25

## 2022-01-14 ASSESSMENT — EXTERNAL EXAM - RIGHT EYE: OD_EXAM: NORMAL

## 2022-01-14 ASSESSMENT — CUP TO DISC RATIO
OD_RATIO: 0.5
OS_RATIO: 0.4

## 2022-01-14 ASSESSMENT — TONOMETRY
IOP_METHOD: APPLANATION
OD_IOP_MMHG: 16
OS_IOP_MMHG: 17

## 2022-01-14 ASSESSMENT — EXTERNAL EXAM - LEFT EYE: OS_EXAM: NORMAL

## 2022-01-14 ASSESSMENT — SLIT LAMP EXAM - LIDS
COMMENTS: 2+ DERMATOCHALASIS
COMMENTS: 2+ DERMATOCHALASIS

## 2022-01-14 NOTE — PROGRESS NOTES
" Current Eye Medications:  systane balance as needed.  Fish oil.  Multivitamin.     Subjective:  Comprehensive Eye Exam.  Patient complains of slightly decreased distance vision in each eye, but this is variable.  He has separate glasses for distance and reading, but primarily wears glasses for reading only.  He recently had a style on his right lower eyelid.       Objective:  See Ophthalmology Exam.       Assessment:  Stable eye exam.  Discs remain somewhat suspicious both eyes.      ICD-10-CM    1. Pseudophakia, ou; Yag Caps, os  Z96.1    2. Glaucoma suspect of both eyes  H40.003    3. Early dry stage nonexudative age-related macular degeneration of both eyes  H35.3131    4. Macular pigment deposit, od  H35.52    5. Posterior vitreous detachment of both eyes  H43.813    6. Encounter for examination of eyes and vision with abnormal findings  Z01.01    7. Presbyopia  H52.4         Plan:  Glasses Rx given - optional  May use artificial tears up to 4 times daily both eyes.  (Refresh Tears, Systane Ultra/Balance, or Theratears)   Possible clouding of posterior capsule both eyes discussed.   Take a multiple vitamin or \"eye vitamin\" daily (AREDS2).  Protect your eyes outdoors from ultraviolet rays with sunglasses and/or brimmed hat.  Have spinach (cooked or raw), colorful fruits, walnuts, hazelnuts, almonds in your diet.  Monitor the vision in each eye weekly - call if any sudden persistent changes.  Return visit 6 months for an intraocular pressure check, glaucoma OCT, retinal OCT, and Gilbert Visual Field.  Ervin Honeycutt M.D.  [phone]       "

## 2022-01-14 NOTE — LETTER
"    1/14/2022         RE: Taqueria Bone  [address] [facility]  [place] [address]        Dear Colleague,    Thank you for referring your patient, Taqueria Bone, to the [facility]. Please see a copy of my visit note below.     Current Eye Medications:  systane balance as needed.  Fish oil.  Multivitamin.     Subjective:  Comprehensive Eye Exam.  Patient complains of slightly decreased distance vision in each eye, but this is variable.  He has separate glasses for distance and reading, but primarily wears glasses for reading only.  He recently had a style on his right lower eyelid.       Objective:  See Ophthalmology Exam.       Assessment:  Stable eye exam.  Discs remain somewhat suspicious both eyes.      ICD-10-CM    1. Pseudophakia, ou; Yag Caps, os  Z96.1    2. Glaucoma suspect of both eyes  H40.003    3. Early dry stage nonexudative age-related macular degeneration of both eyes  H35.3131    4. Macular pigment deposit, od  H35.52    5. Posterior vitreous detachment of both eyes  H43.813    6. Encounter for examination of eyes and vision with abnormal findings  Z01.01    7. Presbyopia  H52.4         Plan:  Glasses Rx given - optional  May use artificial tears up to 4 times daily both eyes.  (Refresh Tears, Systane Ultra/Balance, or Theratears)   Possible clouding of posterior capsule both eyes discussed.   Take a multiple vitamin or \"eye vitamin\" daily (AREDS2).  Protect your eyes outdoors from ultraviolet rays with sunglasses and/or brimmed hat.  Have spinach (cooked or raw), colorful fruits, walnuts, hazelnuts, almonds in your diet.  Monitor the vision in each eye weekly - call if any sudden persistent changes.  Return visit 6 months for an intraocular pressure check, glaucoma OCT, retinal OCT, and Gilbert Visual Field.  Ervin Honeycutt M.D.  [phone]           Again, thank you for allowing me to participate in the care of your patient.        Sincerely,        Ervin ALLEN" MD [place]

## 2022-05-20 DIAGNOSIS — I10 ESSENTIAL HYPERTENSION WITH GOAL BLOOD PRESSURE LESS THAN 140/90: ICD-10-CM

## 2022-05-23 RX ORDER — AMLODIPINE BESYLATE 5 MG/1
TABLET ORAL
Qty: 180 TABLET | Refills: 0 | Status: SHIPPED | OUTPATIENT
Start: 2022-05-23 | End: 2022-08-15

## 2022-07-12 ENCOUNTER — OFFICE VISIT (OUTPATIENT)
Dept: OPHTHALMOLOGY | Facility: CLINIC | Age: 77
End: 2022-07-12
Payer: MEDICARE

## 2022-07-12 DIAGNOSIS — H40.003 GLAUCOMA SUSPECT OF BOTH EYES: Primary | ICD-10-CM

## 2022-07-12 PROCEDURE — 92012 INTRM OPH EXAM EST PATIENT: CPT | Performed by: OPHTHALMOLOGY

## 2022-07-12 PROCEDURE — 92083 EXTENDED VISUAL FIELD XM: CPT | Performed by: OPHTHALMOLOGY

## 2022-07-12 PROCEDURE — 92133 CPTRZD OPH DX IMG PST SGM ON: CPT | Performed by: OPHTHALMOLOGY

## 2022-07-12 ASSESSMENT — TONOMETRY
IOP_METHOD: APPLANATION
OD_IOP_MMHG: 16
OS_IOP_MMHG: 15

## 2022-07-12 ASSESSMENT — VISUAL ACUITY
OD_SC: 20/30
OD_CC+: -2
OD_CC: 20/20
OD_CC: J1+ BE
OS_SC+: -2
OD_SC+: -1
CORRECTION_TYPE: GLASSES
OD_SC: J3 BE
OS_CC+: -2
OS_SC: 20/40
OD_CC: 20/25
METHOD: SNELLEN - LINEAR
OS_CC: 20/20
METHOD: SNELLEN - LINEAR
OS_CC: 20/20
CORRECTION_TYPE: GLASSES
OD_CC+: +2

## 2022-07-12 ASSESSMENT — EXTERNAL EXAM - LEFT EYE: OS_EXAM: NORMAL

## 2022-07-12 ASSESSMENT — SLIT LAMP EXAM - LIDS
COMMENTS: 2+ DERMATOCHALASIS
COMMENTS: 2+ DERMATOCHALASIS

## 2022-07-12 ASSESSMENT — EXTERNAL EXAM - RIGHT EYE: OD_EXAM: NORMAL

## 2022-07-12 NOTE — LETTER
7/12/2022         RE: Taqueria Bone  [address] [facility]  [place] [address]        Dear Colleague,    Thank you for referring your patient, Taqueria Bone, to the [facility]. Please see a copy of my visit note below.     Current Eye Medications:    Systane - both eyes PRN   Lubricant zack - both eyes as needed at bedtime - rare     Subjective:  Glaucoma Suspect present for HVF, OCT and IOP check. Vision slightly more blurred at distance. No pain or discomfort in both eyes.      Objective:  See Ophthalmology Exam.       Assessment:  Stable intraocular pressure, glaucoma OCT, and Gilbert Visual Field both eyes in patient who is a glaucoma suspect.      Plan:  Continue observation with regard to glaucoma suspect status.  Return visit in 6 months for a complete exam.  Ervin Honeycutt M.D.  [phone]            Again, thank you for allowing me to participate in the care of your patient.        Sincerely,        Ervin Honeycutt MD

## 2022-07-12 NOTE — PROGRESS NOTES
Current Eye Medications:    Systane - both eyes PRN   Lubricant zack - both eyes as needed at bedtime - rare     Subjective:  Glaucoma Suspect present for HVF, OCT and IOP check. Vision slightly more blurred at distance. No pain or discomfort in both eyes.      Objective:  See Ophthalmology Exam.       Assessment:  Stable intraocular pressure, glaucoma OCT, and Gilbert Visual Field both eyes in patient who is a glaucoma suspect.      Plan:  Continue observation with regard to glaucoma suspect status.  Return visit in 6 months for a complete exam.  Ervin Honeycutt M.D.  [phone]

## 2022-07-12 NOTE — PATIENT INSTRUCTIONS
Continue observation with regard to glaucoma suspect status.  Return visit in 6 months for a complete exam.  Ervin Honeycutt M.D.  [phone]

## 2022-07-14 ASSESSMENT — PACHYMETRY
OD_CT(UM): 542
OS_CT(UM): 541

## 2022-08-10 NOTE — PATIENT INSTRUCTIONS
Patient Education   Personalized Prevention Plan  You are due for the preventive services outlined below.  Your care team is available to assist you in scheduling these services.  If you have already completed any of these items, please share that information with your care team to update in your medical record.  Health Maintenance Due   Topic Date Due     URINE DRUG SCREEN  Never done     ANNUAL REVIEW OF HM ORDERS  Never done     COVID-19 Vaccine (3 - Booster for Pfizer series) 08/08/2021     PHQ-2 (once per calendar year)  01/01/2022     Basic Metabolic Panel  07/30/2022     FALL RISK ASSESSMENT  07/30/2022     Annual Wellness Visit  07/30/2022

## 2022-08-10 NOTE — PROGRESS NOTES
"SUBJECTIVE:   Taqueria Bone is a 76 year old male who presents for Preventive Visit.  Follow-up htn, ed, ALLERGIC RHINITIS,barrets and high cholesterol.  [facility] and [facility]. Cholesterol ok. cmp ok.   Outside blood pressure readings a little high.  Likes sodium. No shortness of breath.   Good exercise tolerance. Doing some upper body weights.   gerd stable. No dysphagia. No black/bloody. No nausea, vomiting. History Irriatble bowels - stable.  No urine changes or hematuria.   Emotionally doing ok. History erica - on cpap.   No rashes or mole changes. VitaminD.   [employer] skiing in winter.  No falls and memory ok.   . 3 children. Retired [employer] and [employer].   Was on cozaar in past - insurance changes. No regular iburpofen.   Patient has been advised of split billing requirements and indicates understanding: Yes  Are you in the first 12 months of your Medicare coverage?  No    Healthy Habits:     In general, how would you rate your overall health?  Good    Frequency of exercise:  4-5 days/week    Duration of exercise:  45-60 minutes    Do you usually eat at least 4 servings of fruit and vegetables a day, include whole grains    & fiber and avoid regularly eating high fat or \"junk\" foods?  Yes    Taking medications regularly:  Yes    Ability to successfully perform activities of daily living:  No assistance needed    Home Safety:  Throw rugs in the hallway    Hearing Impairment:  No hearing concerns    In the past 6 months, have you been bothered by leaking of urine?  No    In general, how would you rate your overall mental or emotional health?  Good      PHQ-2 Total Score: 1    Additional concerns today:  Yes    Do you feel safe in your environment? Yes    Have you ever done Advance Care Planning? (For example, a Health Directive, POLST, or a discussion with a medical provider or your loved ones about your wishes): No, advance care planning information given to patient to review.  Patient " plans to discuss their wishes with loved ones or provider.         Fall risk  Fallen 2 or more times in the past year?: No  Any fall with injury in the past year?: No    Cognitive Screening   1) Repeat 3 items (Leader, Season, Table)    2) Clock draw: NORMAL  3) 3 item recall: Recalls 3 objects  Results: 3 items recalled: COGNITIVE IMPAIRMENT LESS LIKELY    Mini-CogTM Copyright [employer]. Licensed by the author for use in [facility]; reprinted with permission (albert@[facility]). All rights reserved.      Do you have sleep apnea, excessive snoring or daytime drowsiness?: yes    Reviewed and updated as needed this visit by clinical staff                    Reviewed and updated as needed this visit by Provider                   Social History     Tobacco Use     Smoking status: Former Smoker     Packs/day: 0.50     Years: 18.00     Pack years: 9.00     Types: Cigarettes, Cigars, Pipe, Dip, chew, snus or snuff     Start date: 10/1/1961     Quit date: 10/31/1979     Years since quittin.8     Smokeless tobacco: Former User     Quit date: 10/1/1970     Tobacco comment:    Substance Use Topics     Alcohol use: Yes     Alcohol/week: 0.8 standard drinks     Comment: 1-2 beer daily     If you drink alcohol do you typically have >3 drinks per day or >7 drinks per week? Yes      Alcohol Use 2021   Prescreen: >3 drinks/day or >7 drinks/week? No   Prescreen: >3 drinks/day or >7 drinks/week? -     AUDIT - Alcohol Use Disorders Identification Test - Reproduced from the World Health Organization Audit 2001 (Second Edition) 8/15/2022   1.  How often do you have a drink containing alcohol? 4 or more times a week   2.  How many drinks containing alcohol do you have on a typical day when you are drinking? 1 or 2   3.  How often do you have five or more drinks on one occasion? Less than monthly   4.  How often during the last year have you found that you were not able to stop drinking once you had started? Never   5.   How often during the last year have you failed to do what was normally expected of you because of drinking? Never   6.  How often during the last year have you needed a first drink in the morning to get yourself going after a heavy drinking session? Never   7.  How often during the last year have you had a feeling of guilt or remorse after drinking? Never   8.  How often during the last year have you been unable to remember what happened the night before because of your drinking? Never   9.  Have you or someone else been injured because of your drinking? No   10. Has a relative, friend, doctor or other health care worker been concerned about your drinking or suggested you cut down? No   TOTAL SCORE 5           Refill medication, ankle pain- Now taking meloxicam has some questions: should he take it while still drinking his beer. Chest pain once in awhile.     Current providers sharing in care for this patient include:   Patient Care Team:  Myron Bob MD as PCP - General (Family Practice)  Ervin Honeycutt MD as Assigned Surgical Provider  Myron Bob MD as Assigned PCP    The following health maintenance items are reviewed in Epic and correct as of today:  Health Maintenance Due   Topic Date Due     URINE DRUG SCREEN  Never done     ANNUAL REVIEW OF HM ORDERS  Never done     COVID-19 Vaccine (3 - Booster for Pfizer series) 08/08/2021     PHQ-2 (once per calendar year)  01/01/2022     BMP  07/30/2022     FALL RISK ASSESSMENT  07/30/2022     MEDICARE ANNUAL WELLNESS VISIT  07/30/2022           Review of Systems   Constitutional: Negative for chills and fever.   HENT: Positive for congestion and hearing loss. Negative for ear pain and sore throat.    Eyes: Negative for pain and visual disturbance.   Respiratory: Positive for cough. Negative for shortness of breath.    Cardiovascular: Positive for chest pain. Negative for palpitations and peripheral edema.   Gastrointestinal: Positive for constipation.  "Negative for abdominal pain, diarrhea, heartburn, hematochezia and nausea.   Genitourinary: Positive for impotence and urgency. Negative for dysuria, genital sores, hematuria and penile discharge.   Musculoskeletal: Positive for arthralgias and myalgias. Negative for joint swelling.   Skin: Negative for rash.   Neurological: Positive for dizziness, weakness and headaches. Negative for paresthesias.   Psychiatric/Behavioral: Negative for mood changes. The patient is nervous/anxious.        OBJECTIVE:   There were no vitals taken for this visit. Estimated body mass index is 27.83 kg/m  as calculated from the following:    Height as of 7/30/21: 5' 8\" (1.727 m).    Weight as of 7/30/21: 183 lb (83 kg).   BP (!) 158/97   Pulse 99   Temp 98.5  F (36.9  C) (Tympanic)   Resp 18   Ht 1.715 m (5' 7.5\")   Wt 84.4 kg (186 lb)   SpO2 97%   BMI 28.70 kg/m     Physical Exam  GENERAL: healthy, alert and no distress  EYES: Eyes grossly normal to inspection, PERRL and conjunctivae and sclerae normal  HENT: ear canals and TM's normal, nose and mouth without ulcers or lesions  NECK: no adenopathy, no asymmetry, masses, or scars and thyroid normal to palpation  RESP: lungs clear to auscultation - no rales, rhonchi or wheezes  BREAST: normal without masses, tenderness or nipple discharge and no palpable axillary masses or adenopathy  CV: regular rate and rhythm, normal S1 S2, no S3 or S4, no murmur, click or rub, no peripheral edema and peripheral pulses strong  ABDOMEN: soft, nontender, no hepatosplenomegaly, no masses and bowel sounds normal   (male): patient deferred /rectal exams  MS: no gross musculoskeletal defects noted, no edema  SKIN: no suspicious lesions or rashes  NEURO: Normal strength and tone, mentation intact and speech normal  PSYCH: mentation appears normal, affect normal/bright  LYMPH: no cervical, supraclavicular, axillary, or inguinal adenopathy    ASSESSMENT / PLAN:   ASSESSMENT / PLAN:  (Z00.00) " "Encounter for Medicare annual wellness exam  (primary encounter diagnosis)  Comment: generally healthy and normal exam. Memory ok and low fall risk. Home ok  Plan: Reviewed self mole/testicle check handout.  Continue vitaminD. Exercise.     (Z91.89) 10 year risk of MI or stroke 7.5% or greater, 20.7% in October 2018  Plan: atorvastatin (LIPITOR) 20 MG tablet        Reveiwed risks and side effects of medication  Chest pain or shortness of breath to er. [facility]    (I10) Essential hypertension with goal blood pressure less than 140/90  Comment: high and outside high too  Plan: amLODIPine (NORVASC) 5 MG tablet, losartan         (COZAAR) 25 MG tablet        On cozaar in past. Reveiwed risks and side effects of medication  Lots ofwater. Continue self-monitor. Recheck in 6 months  Sooner if worse/not at goal. Call/email with questions/concerns.     (K22.70) Sales's esophagus without dysplasia  Comment: stable  Plan: per GI. Limit caffeine/ ALCOHOL.         Patient has been advised of split billing requirements and indicates understanding: Yes    COUNSELING:  Reviewed preventive health counseling, as reflected in patient instructions       Regular exercise       Healthy diet/nutrition       Vision screening       Dental care       Bladder control       Fall risk prevention       Alcohol Use     Estimated body mass index is 27.83 kg/m  as calculated from the following:    Height as of 7/30/21: 5' 8\" (1.727 m).    Weight as of 7/30/21: 183 lb (83 kg).    Weight management plan: Discussed healthy diet and exercise guidelines    He reports that he quit smoking about 42 years ago. His smoking use included cigarettes, cigars, pipe, and dip, chew, snus or snuff. He started smoking about 60 years ago. He has a 9.00 pack-year smoking history. He quit smokeless tobacco use about 51 years ago.      Appropriate preventive services were discussed with this patient, including applicable screening as appropriate for " cardiovascular disease, diabetes, osteopenia/osteoporosis, and glaucoma.  As appropriate for age/gender, discussed screening for colorectal cancer, prostate cancer, breast cancer, and cervical cancer. Checklist reviewing preventive services available has been given to the patient.    Reviewed patients plan of care and provided an AVS. The Intermediate Care Plan ( asthma action plan, low back pain action plan, and migraine action plan) for Taqueria meets the Care Plan requirement. This Care Plan has been established and reviewed with the Patient.    Counseling Resources:  ATP IV Guidelines  Pooled Cohorts Equation Calculator  Breast Cancer Risk Calculator  Breast Cancer: Medication to Reduce Risk  FRAX Risk Assessment  ICSI Preventive Guidelines  Dietary Guidelines for Americans, 2010  USDA's MyPlate  ASA Prophylaxis  Lung CA Screening    Emmanuel Jordan MD  [facility]    Identified Health Risks:

## 2022-08-15 ENCOUNTER — OFFICE VISIT (OUTPATIENT)
Dept: FAMILY MEDICINE | Facility: CLINIC | Age: 77
End: 2022-08-15
Payer: MEDICARE

## 2022-08-15 VITALS
OXYGEN SATURATION: 97 % | SYSTOLIC BLOOD PRESSURE: 158 MMHG | TEMPERATURE: 98.5 F | BODY MASS INDEX: 28.19 KG/M2 | HEART RATE: 99 BPM | WEIGHT: 186 LBS | DIASTOLIC BLOOD PRESSURE: 97 MMHG | RESPIRATION RATE: 18 BRPM | HEIGHT: 68 IN

## 2022-08-15 DIAGNOSIS — I10 ESSENTIAL HYPERTENSION WITH GOAL BLOOD PRESSURE LESS THAN 140/90: ICD-10-CM

## 2022-08-15 DIAGNOSIS — Z00.00 ENCOUNTER FOR MEDICARE ANNUAL WELLNESS EXAM: Primary | ICD-10-CM

## 2022-08-15 DIAGNOSIS — K22.70 BARRETT'S ESOPHAGUS WITHOUT DYSPLASIA: ICD-10-CM

## 2022-08-15 DIAGNOSIS — Z91.89 10 YEAR RISK OF MI OR STROKE 7.5% OR GREATER: ICD-10-CM

## 2022-08-15 PROCEDURE — 99213 OFFICE O/P EST LOW 20 MIN: CPT | Mod: 25 | Performed by: FAMILY MEDICINE

## 2022-08-15 PROCEDURE — G0439 PPPS, SUBSEQ VISIT: HCPCS | Performed by: FAMILY MEDICINE

## 2022-08-15 RX ORDER — AMLODIPINE BESYLATE 5 MG/1
10 TABLET ORAL DAILY
Qty: 180 TABLET | Refills: 1 | Status: SHIPPED | OUTPATIENT
Start: 2022-08-15 | End: 2023-01-17

## 2022-08-15 RX ORDER — LOSARTAN POTASSIUM 25 MG/1
25 TABLET ORAL DAILY
Qty: 90 TABLET | Refills: 1 | Status: SHIPPED | OUTPATIENT
Start: 2022-08-15 | End: 2023-01-17

## 2022-08-15 RX ORDER — ATORVASTATIN CALCIUM 20 MG/1
TABLET, FILM COATED ORAL
Qty: 90 TABLET | Refills: 1 | Status: SHIPPED | OUTPATIENT
Start: 2022-08-15 | End: 2023-01-17

## 2022-08-15 ASSESSMENT — ENCOUNTER SYMPTOMS
DIZZINESS: 1
DYSURIA: 0
PALPITATIONS: 0
JOINT SWELLING: 0
CONSTIPATION: 1
HEADACHES: 1
MYALGIAS: 1
ARTHRALGIAS: 1
HEMATOCHEZIA: 0
NAUSEA: 0
WEAKNESS: 1
COUGH: 1
ABDOMINAL PAIN: 0
HEMATURIA: 0
PARESTHESIAS: 0
SHORTNESS OF BREATH: 0
FEVER: 0
SORE THROAT: 0
EYE PAIN: 0
NERVOUS/ANXIOUS: 1
DIARRHEA: 0
HEARTBURN: 0
CHILLS: 0

## 2022-08-15 ASSESSMENT — PAIN SCALES - GENERAL: PAINLEVEL: NO PAIN (0)

## 2022-08-15 ASSESSMENT — ACTIVITIES OF DAILY LIVING (ADL): CURRENT_FUNCTION: NO ASSISTANCE NEEDED

## 2022-09-07 ENCOUNTER — TELEPHONE (OUTPATIENT)
Dept: FAMILY MEDICINE | Facility: CLINIC | Age: 77
End: 2022-09-07

## 2022-09-07 DIAGNOSIS — I10 HYPERTENSION GOAL BP (BLOOD PRESSURE) < 140/90: Primary | ICD-10-CM

## 2022-09-07 RX ORDER — LOSARTAN POTASSIUM 50 MG/1
50 TABLET ORAL DAILY
Qty: 90 TABLET | Refills: 1 | Status: SHIPPED | OUTPATIENT
Start: 2022-09-07 | End: 2023-01-17

## 2022-09-07 NOTE — TELEPHONE ENCOUNTER
Provider: Please send another Prescription(s) if you would like him to continue the losartan 50 daily. He was asked to read the bottle to ensure he will take the correct dose.  Thank you. Nisha Gunter R.N.    Patient reports that he has been taking losartan (COZAAR) 50 MG tablet. He started at 25 mg and it did not do anything so he increased it as advised.    He has been taking losartan (COZAAR) 50 MG tablet and his blood pressure went down to 139/69. It was running in the 150s/90-100s.  No side effects to the best of his knowledge.

## 2022-09-20 DIAGNOSIS — R09.81 SINUS CONGESTION: ICD-10-CM

## 2022-09-20 RX ORDER — FLUTICASONE PROPIONATE 50 MCG
SPRAY, SUSPENSION (ML) NASAL
Qty: 48 G | Refills: 3 | Status: SHIPPED | OUTPATIENT
Start: 2022-09-20 | End: 2024-04-30

## 2022-10-29 ENCOUNTER — HEALTH MAINTENANCE LETTER (OUTPATIENT)
Age: 77
End: 2022-10-29

## 2022-11-17 ENCOUNTER — TRANSFERRED RECORDS (OUTPATIENT)
Dept: HEALTH INFORMATION MANAGEMENT | Facility: CLINIC | Age: 77
End: 2022-11-17

## 2022-12-26 ENCOUNTER — DOCUMENTATION ONLY (OUTPATIENT)
Dept: LAB | Facility: CLINIC | Age: 77
End: 2022-12-26

## 2022-12-26 DIAGNOSIS — Z13.6 CARDIOVASCULAR SCREENING; LDL GOAL LESS THAN 130: ICD-10-CM

## 2022-12-26 DIAGNOSIS — I10 ESSENTIAL HYPERTENSION WITH GOAL BLOOD PRESSURE LESS THAN 140/90: Primary | ICD-10-CM

## 2022-12-26 NOTE — PROGRESS NOTES
Taqueria KENA Bone has an upcoming lab appointment:    Future Appointments   Date Time Provider Department Center   1/3/2023 10:15 AM AN LAB [facility]   1/16/2023 12:50 PM Ervin Honeycutt MD [facility]   1/17/2023 12:00 PM Emmanuel Jordan MD [facility]         There is no order available. Please review and place either future orders or HMPO (Review of Health Maintenance Protocol Orders), as appropriate.    Health Maintenance Due   Topic     ANNUAL REVIEW OF HM ORDERS      AGUEDA SANCHEZ

## 2023-01-03 ENCOUNTER — LAB (OUTPATIENT)
Dept: LAB | Facility: CLINIC | Age: 78
End: 2023-01-03
Payer: MEDICARE

## 2023-01-03 DIAGNOSIS — I10 ESSENTIAL HYPERTENSION WITH GOAL BLOOD PRESSURE LESS THAN 140/90: ICD-10-CM

## 2023-01-03 DIAGNOSIS — Z13.6 CARDIOVASCULAR SCREENING; LDL GOAL LESS THAN 130: ICD-10-CM

## 2023-01-03 LAB
ALBUMIN SERPL-MCNC: 3.8 G/DL (ref 3.4–5)
ALP SERPL-CCNC: 115 U/L (ref 40–150)
ALT SERPL W P-5'-P-CCNC: 48 U/L (ref 0–70)
ANION GAP SERPL CALCULATED.3IONS-SCNC: 2 MMOL/L (ref 3–14)
AST SERPL W P-5'-P-CCNC: 24 U/L (ref 0–45)
BILIRUB SERPL-MCNC: 0.8 MG/DL (ref 0.2–1.3)
BUN SERPL-MCNC: 13 MG/DL (ref 7–30)
CALCIUM SERPL-MCNC: 9.7 MG/DL (ref 8.5–10.1)
CHLORIDE BLD-SCNC: 108 MMOL/L (ref 94–109)
CHOLEST SERPL-MCNC: 133 MG/DL
CO2 SERPL-SCNC: 31 MMOL/L (ref 20–32)
CREAT SERPL-MCNC: 0.81 MG/DL (ref 0.66–1.25)
FASTING STATUS PATIENT QL REPORTED: YES
GFR SERPL CREATININE-BSD FRML MDRD: >90 ML/MIN/1.73M2
GLUCOSE BLD-MCNC: 116 MG/DL (ref 70–99)
HDLC SERPL-MCNC: 58 MG/DL
LDLC SERPL CALC-MCNC: 60 MG/DL
NONHDLC SERPL-MCNC: 75 MG/DL
POTASSIUM BLD-SCNC: 4.4 MMOL/L (ref 3.4–5.3)
PROT SERPL-MCNC: 7.4 G/DL (ref 6.8–8.8)
SODIUM SERPL-SCNC: 141 MMOL/L (ref 133–144)
TRIGL SERPL-MCNC: 77 MG/DL

## 2023-01-03 PROCEDURE — 80053 COMPREHEN METABOLIC PANEL: CPT

## 2023-01-03 PROCEDURE — 36415 COLL VENOUS BLD VENIPUNCTURE: CPT

## 2023-01-03 PROCEDURE — 80061 LIPID PANEL: CPT

## 2023-01-16 ENCOUNTER — OFFICE VISIT (OUTPATIENT)
Dept: OPHTHALMOLOGY | Facility: CLINIC | Age: 78
End: 2023-01-16
Payer: MEDICARE

## 2023-01-16 DIAGNOSIS — H52.4 PRESBYOPIA: ICD-10-CM

## 2023-01-16 DIAGNOSIS — H35.3131 EARLY DRY STAGE NONEXUDATIVE AGE-RELATED MACULAR DEGENERATION OF BOTH EYES: ICD-10-CM

## 2023-01-16 DIAGNOSIS — H40.003 GLAUCOMA SUSPECT OF BOTH EYES: ICD-10-CM

## 2023-01-16 DIAGNOSIS — H35.52 MACULAR PIGMENT DEPOSIT: ICD-10-CM

## 2023-01-16 DIAGNOSIS — Z96.1 PSEUDOPHAKIA: Primary | ICD-10-CM

## 2023-01-16 DIAGNOSIS — H43.813 POSTERIOR VITREOUS DETACHMENT OF BOTH EYES: ICD-10-CM

## 2023-01-16 DIAGNOSIS — Z01.01 ENCOUNTER FOR EXAMINATION OF EYES AND VISION WITH ABNORMAL FINDINGS: ICD-10-CM

## 2023-01-16 PROCEDURE — 92014 COMPRE OPH EXAM EST PT 1/>: CPT | Performed by: OPHTHALMOLOGY

## 2023-01-16 PROCEDURE — 92015 DETERMINE REFRACTIVE STATE: CPT | Mod: GY | Performed by: OPHTHALMOLOGY

## 2023-01-16 ASSESSMENT — REFRACTION_WEARINGRX
OD_ADD: +2.75
OS_AXIS: 150
OS_SPHERE: -2.25
OS_ADD: +2.75
SPECS_TYPE: BIFOCAL
OD_AXIS: 068
OD_CYLINDER: +0.50
OS_CYLINDER: +1.00
OD_SPHERE: -0.25

## 2023-01-16 ASSESSMENT — TONOMETRY
IOP_METHOD: APPLANATION
OS_IOP_MMHG: 17
OD_IOP_MMHG: 17

## 2023-01-16 ASSESSMENT — CUP TO DISC RATIO
OS_RATIO: 0.4
OD_RATIO: 0.5

## 2023-01-16 ASSESSMENT — CONF VISUAL FIELD
OD_INFERIOR_TEMPORAL_RESTRICTION: 0
OD_NORMAL: 1
OS_SUPERIOR_TEMPORAL_RESTRICTION: 0
OD_INFERIOR_NASAL_RESTRICTION: 0
OS_INFERIOR_NASAL_RESTRICTION: 0
OS_NORMAL: 1
OS_SUPERIOR_NASAL_RESTRICTION: 0
OD_SUPERIOR_TEMPORAL_RESTRICTION: 0
OD_SUPERIOR_NASAL_RESTRICTION: 0
OS_INFERIOR_TEMPORAL_RESTRICTION: 0

## 2023-01-16 ASSESSMENT — REFRACTION_MANIFEST
OS_SPHERE: -2.00
OS_ADD: +2.75
OD_ADD: +2.75
OD_SPHERE: PLANO
OS_CYLINDER: +1.00
OS_AXIS: 150
OD_CYLINDER: SPHERE

## 2023-01-16 ASSESSMENT — SLIT LAMP EXAM - LIDS
COMMENTS: 2-3+ DERMATOCHALASIS, 1+ MEIBOMIAN GLAND DYSFUNCTION
COMMENTS: 2-3+ DERMATOCHALASIS, 1+ MEIBOMIAN GLAND DYSFUNCTION

## 2023-01-16 ASSESSMENT — VISUAL ACUITY
OS_CC+: -2
OS_CC: 20/20
OD_CC: 20/20
OD_CC+: -2
METHOD: SNELLEN - LINEAR
CORRECTION_TYPE: GLASSES

## 2023-01-16 ASSESSMENT — EXTERNAL EXAM - LEFT EYE: OS_EXAM: NORMAL

## 2023-01-16 ASSESSMENT — EXTERNAL EXAM - RIGHT EYE: OD_EXAM: NORMAL

## 2023-01-16 NOTE — PROGRESS NOTES
"  ASSESSMENT / PLAN:  (I10) Hypertension goal BP (blood pressure) < 140/90  (primary encounter diagnosis)  Comment: stable  Plan: amLODIPine (NORVASC) 5 MG tablet, losartan         (COZAAR) 50 MG tablet        Continue meds and self-monitor. Chest pain or shortness of breath to er. Recheck in 6 months  Sooner if wrose/new issues    (R73.02) IGT (impaired glucose tolerance)  Comment: pre-dm  Plan: continue diet/exercise. Recheck in 6 months      (E78.5) Hyperlipidemia LDL goal <130  Comment: stable  Plan: atorvastatin (LIPITOR) 20 MG tablet        Low carb. Exercise.         Tanya Meng is a 77 year old presenting for the following health issues:  Follow-up htn, ed, hyperglycemia, ALLERGIC RHINITIS,barrets and high cholesterol.  Cozaar added in summer for blood pressure to norvasc.   Working on diet. Exercise walking - 2 miles. Dumb-bells. History shoulder pain - thera-bands.   No chest pain or shortness of breath.  gerd stable - no issues. No dysphagia.  Taking MVI/milk history Irritable bowels.   March- [place] [employer] skiing.   No issues with cozaar.   Outside blood pressure readings good.   No chief complaint on file.      History of Present Illness       Hypertension: He presents for follow up of hypertension.  He does check blood pressure  regularly outside of the clinic. Outside blood pressures have been over 140/90. He follows a low salt diet.           Review of Systems         Objective    There were no vitals taken for this visit.  There is no height or weight on file to calculate BMI.   /71   Pulse 107   Temp 98.7  F (37.1  C) (Oral)   Resp 22   Ht 1.702 m (5' 7\")   Wt 86.2 kg (190 lb)   PF 96 L/min   BMI 29.76 kg/m     Physical Exam   GENERAL: healthy, alert and no distress  EYES: Eyes grossly normal to inspection, PERRL and conjunctivae and sclerae normal  NECK: no adenopathy, no asymmetry, masses, or scars and thyroid normal to palpation  RESP: lungs clear to auscultation - no " rales, rhonchi or wheezes  CV: regular rate and rhythm, normal S1 S2, no S3 or S4, no murmur, click or rub, no peripheral edema and peripheral pulses strong  ABDOMEN: soft, nontender, no hepatosplenomegaly, no masses and bowel sounds normal  MS: no gross musculoskeletal defects noted, no edema  PSYCH: mentation appears normal, affect normal/bright

## 2023-01-16 NOTE — LETTER
"    1/16/2023         RE: Taqueria Bone  [address] [facility]  [place] [address]        Dear Colleague,    Thank you for referring your patient, Taqueria Bone, to the [facility]. Please see a copy of my visit note below.     Current Eye Medications:  Systane as needed. Taking MVI and lutein.     Subjective: Here for complete eye exam.   Vision seems stable at distance and near in both eyes. Floaters are stable. Left eye feels dry sometimes, lubricating drops help.   Patient typically only wears glasses for reading but distance glasses do help to sharpen distance vision.      Objective:  See Ophthalmology Exam.       Assessment:  Stable eye exam.      ICD-10-CM    1. Pseudophakia, ou; Yag Caps, os  Z96.1       2. Glaucoma suspect of both eyes  H40.003       3. Early dry stage nonexudative age-related macular degeneration of both eyes  H35.3131       4. Macular pigment deposit, od  H35.52       5. Posterior vitreous detachment of both eyes  H43.813       6. Encounter for examination of eyes and vision with abnormal findings  Z01.01       7. Presbyopia  H52.4            Plan:  Glasses prescription given - optional  May use artificial tears up to four times a day (like Refresh Optive, Systane Balance, TheraTears, or generic artificial tears are ok. Avoid \"get the red out\" drops).   Take a multiple vitamin or \"eye vitamin\" daily (AREDS2).  Protect your eyes outdoors from ultraviolet rays with sunglasses and/or brimmed hat.  Have spinach (cooked or raw), colorful fruits, walnuts, hazelnuts, almonds in your diet.  Monitor the vision in each eye weekly - call if any sudden persistent changes.   Possible clouding of posterior capsule right eye discussed.    Call in September 2023 for an appointment in January 2024 for Complete Exam    Dr. Honeycutt [phone]           Again, thank you for allowing me to participate in the care of your patient.        Sincerely,        Ervin Honeycutt, " MD

## 2023-01-16 NOTE — PROGRESS NOTES
" Current Eye Medications:  Systane as needed. Taking MVI and lutein.     Subjective: Here for complete eye exam.   Vision seems stable at distance and near in both eyes. Floaters are stable. Left eye feels dry sometimes, lubricating drops help.   Patient typically only wears glasses for reading but distance glasses do help to sharpen distance vision.      Objective:  See Ophthalmology Exam.       Assessment:  Stable eye exam.      ICD-10-CM    1. Pseudophakia, ou; Yag Caps, os  Z96.1       2. Glaucoma suspect of both eyes  H40.003       3. Early dry stage nonexudative age-related macular degeneration of both eyes  H35.3131       4. Macular pigment deposit, od  H35.52       5. Posterior vitreous detachment of both eyes  H43.813       6. Encounter for examination of eyes and vision with abnormal findings  Z01.01       7. Presbyopia  H52.4            Plan:  Glasses prescription given - optional  May use artificial tears up to four times a day (like Refresh Optive, Systane Balance, TheraTears, or generic artificial tears are ok. Avoid \"get the red out\" drops).   Take a multiple vitamin or \"eye vitamin\" daily (AREDS2).  Protect your eyes outdoors from ultraviolet rays with sunglasses and/or brimmed hat.  Have spinach (cooked or raw), colorful fruits, walnuts, hazelnuts, almonds in your diet.  Monitor the vision in each eye weekly - call if any sudden persistent changes.   Possible clouding of posterior capsule right eye discussed.    Call in September 2023 for an appointment in January 2024 for Complete Exam    Dr. Honeycutt [phone]       "

## 2023-01-16 NOTE — PATIENT INSTRUCTIONS
"Glasses prescription given - optional  May use artificial tears up to four times a day (like Refresh Optive, Systane Balance, TheraTears, or generic artificial tears are ok. Avoid \"get the red out\" drops).   Take a multiple vitamin or \"eye vitamin\" daily (AREDS2).  Protect your eyes outdoors from ultraviolet rays with sunglasses and/or brimmed hat.  Have spinach (cooked or raw), colorful fruits, walnuts, hazelnuts, almonds in your diet.  Monitor the vision in each eye weekly - call if any sudden persistent changes.   Possible clouding of posterior capsule right eye discussed.    Call in September 2023 for an appointment in January 2024 for Complete Exam    Dr. Honeycutt [phone]    "

## 2023-01-17 ENCOUNTER — OFFICE VISIT (OUTPATIENT)
Dept: FAMILY MEDICINE | Facility: CLINIC | Age: 78
End: 2023-01-17
Payer: MEDICARE

## 2023-01-17 VITALS
SYSTOLIC BLOOD PRESSURE: 128 MMHG | TEMPERATURE: 98.7 F | WEIGHT: 190 LBS | RESPIRATION RATE: 22 BRPM | HEART RATE: 107 BPM | HEIGHT: 67 IN | DIASTOLIC BLOOD PRESSURE: 71 MMHG | BODY MASS INDEX: 29.82 KG/M2

## 2023-01-17 DIAGNOSIS — R73.02 IGT (IMPAIRED GLUCOSE TOLERANCE): ICD-10-CM

## 2023-01-17 DIAGNOSIS — I10 HYPERTENSION GOAL BP (BLOOD PRESSURE) < 140/90: Primary | ICD-10-CM

## 2023-01-17 DIAGNOSIS — E78.5 HYPERLIPIDEMIA LDL GOAL <130: ICD-10-CM

## 2023-01-17 PROCEDURE — 99214 OFFICE O/P EST MOD 30 MIN: CPT | Performed by: FAMILY MEDICINE

## 2023-01-17 RX ORDER — LOSARTAN POTASSIUM 50 MG/1
50 TABLET ORAL DAILY
Qty: 90 TABLET | Refills: 1 | Status: SHIPPED | OUTPATIENT
Start: 2023-01-17 | End: 2023-07-18

## 2023-01-17 RX ORDER — ATORVASTATIN CALCIUM 20 MG/1
TABLET, FILM COATED ORAL
Qty: 90 TABLET | Refills: 1 | Status: SHIPPED | OUTPATIENT
Start: 2023-01-17 | End: 2023-07-18

## 2023-01-17 RX ORDER — SPIRONOLACTONE 25 MG
TABLET ORAL
COMMUNITY

## 2023-01-17 RX ORDER — AMLODIPINE BESYLATE 5 MG/1
10 TABLET ORAL DAILY
Qty: 180 TABLET | Refills: 1 | Status: SHIPPED | OUTPATIENT
Start: 2023-01-17 | End: 2023-06-26

## 2023-01-17 ASSESSMENT — PAIN SCALES - GENERAL: PAINLEVEL: MILD PAIN (3)

## 2023-01-24 NOTE — PATIENT INSTRUCTIONS
No laser needed at this time.  Continue observation with regard to glaucoma suspect status.  Return visit 6 months for refraction, intraocular pressure check, glaucoma OCT, retinal OCT, pachy, and Gilbert Visual Field.  Ervin Honeycutt M.D.  [phone]      Sotyktu Counseling:  I discussed the most common side effects of Sotyktu including: common cold, sore throat, sinus infections, cold sores, canker sores, folliculitis, and acne.  I also discussed more serious side effects of Sotyktu including but not limited to: serious allergic reactions; increased risk for infections such as TB; cancers such as lymphomas; rhabdomyolysis and elevated CPK; and elevated triglycerides and liver enzymes.

## 2023-02-15 ENCOUNTER — E-VISIT (OUTPATIENT)
Dept: FAMILY MEDICINE | Facility: CLINIC | Age: 78
End: 2023-02-15
Payer: MEDICARE

## 2023-02-15 DIAGNOSIS — H92.03 OTALGIA, BILATERAL: Primary | ICD-10-CM

## 2023-02-15 PROCEDURE — 99421 OL DIG E/M SVC 5-10 MIN: CPT | Performed by: FAMILY MEDICINE

## 2023-02-15 RX ORDER — CEFUROXIME AXETIL 500 MG/1
500 TABLET ORAL 2 TIMES DAILY
Qty: 14 TABLET | Refills: 0 | Status: SHIPPED | OUTPATIENT
Start: 2023-02-15 | End: 2023-02-22

## 2023-03-25 ENCOUNTER — OFFICE VISIT (OUTPATIENT)
Dept: URGENT CARE | Facility: URGENT CARE | Age: 78
End: 2023-03-25
Payer: MEDICARE

## 2023-03-25 VITALS
WEIGHT: 189 LBS | TEMPERATURE: 97.9 F | DIASTOLIC BLOOD PRESSURE: 79 MMHG | HEART RATE: 102 BPM | RESPIRATION RATE: 18 BRPM | SYSTOLIC BLOOD PRESSURE: 151 MMHG | OXYGEN SATURATION: 98 % | BODY MASS INDEX: 29.6 KG/M2

## 2023-03-25 DIAGNOSIS — J20.9 ACUTE BRONCHITIS, UNSPECIFIED ORGANISM: ICD-10-CM

## 2023-03-25 DIAGNOSIS — J01.90 ACUTE SINUSITIS WITH SYMPTOMS > 10 DAYS: Primary | ICD-10-CM

## 2023-03-25 PROCEDURE — 99213 OFFICE O/P EST LOW 20 MIN: CPT | Performed by: INTERNAL MEDICINE

## 2023-03-25 RX ORDER — DOXYCYCLINE 100 MG/1
100 CAPSULE ORAL 2 TIMES DAILY
Qty: 20 CAPSULE | Refills: 0 | Status: SHIPPED | OUTPATIENT
Start: 2023-03-25 | End: 2023-04-04

## 2023-03-25 ASSESSMENT — ENCOUNTER SYMPTOMS
FEVER: 0
DIAPHORESIS: 0
SHORTNESS OF BREATH: 0
CHILLS: 0

## 2023-03-25 NOTE — PROGRESS NOTES
ASSESSMENT AND PLAN:      ICD-10-CM    1. Acute sinusitis with symptoms > 10 days  J01.90 doxycycline hyclate (VIBRAMYCIN) 100 MG capsule      2. Acute bronchitis, unspecified organism  J20.9 doxycycline hyclate (VIBRAMYCIN) 100 MG capsule      doxycycline      Call or return to clinic if symptoms worsen or fail to improve as anticipated.          Asha Ramesh MD  [facility]    Subjective     Taqueria Bone is a 77 year old who presents for Patient presents with:  Sinus Problem  Cough: Chest congestion for 2 weeks    an established patient of [facility].    UR Adult    Onset of symptoms was 2 week(s) ago.    Current and Associated symptoms:  sore throat & cough - productive  Settled in the chest, right side chest   Sinus pain left side     Treatment measures tried include Tylenol/Ibuprofen, Decongestants and OTC Cough med.  Predisposing factors include airplane travel.        Review of Systems   Constitutional: Negative for chills, diaphoresis and fever.   Respiratory: Negative for shortness of breath.            Objective    BP (!) 151/79 (BP Location: Right arm, Patient Position: Sitting, Cuff Size: Adult Regular)   Pulse 102   Temp 97.9  F (36.6  C) (Tympanic)   Resp 18   Wt 85.7 kg (189 lb)   SpO2 98%   BMI 29.60 kg/m    Physical Exam  HENT:      Nose:      Comments: left sinus pain     Mouth/Throat:      Mouth: Mucous membranes are moist.      Pharynx: Oropharynx is clear. No oropharyngeal exudate or posterior oropharyngeal erythema.   Cardiovascular:      Rate and Rhythm: Normal rate and regular rhythm.      Pulses: Normal pulses.      Heart sounds: Normal heart sounds.   Pulmonary:      Effort: Pulmonary effort is normal.      Breath sounds: Normal breath sounds.

## 2023-05-11 DIAGNOSIS — N52.9 ERECTILE DYSFUNCTION, UNSPECIFIED ERECTILE DYSFUNCTION TYPE: ICD-10-CM

## 2023-05-11 RX ORDER — SILDENAFIL CITRATE 20 MG/1
TABLET ORAL
Qty: 30 TABLET | Refills: 3 | Status: SHIPPED | OUTPATIENT
Start: 2023-05-11 | End: 2024-07-15

## 2023-06-25 DIAGNOSIS — I10 HYPERTENSION GOAL BP (BLOOD PRESSURE) < 140/90: ICD-10-CM

## 2023-06-25 NOTE — LETTER
June 26, 2023    aTqueria Bone  [address] [facility]  [place] [address]    Dear Taqueria,       We recently received a refill request for amLODIPine (NORVASC) 5 MG tablet.  We have refilled this for a one time 90 day supply only because you are due for a:    Wellness exam office visit and fasting lab appointment      Please schedule an office visit with your provider and a lab appointment 4-5 days prior to the office visit.     Please call at your earliest convenience so that there will not be a delay with your future refills.          Thank you,   Your [facility] Team/  [phone]

## 2023-06-26 RX ORDER — AMLODIPINE BESYLATE 5 MG/1
TABLET ORAL
Qty: 180 TABLET | Refills: 3 | Status: SHIPPED | OUTPATIENT
Start: 2023-06-26 | End: 2023-07-18

## 2023-06-28 ENCOUNTER — DOCUMENTATION ONLY (OUTPATIENT)
Dept: LAB | Facility: CLINIC | Age: 78
End: 2023-06-28
Payer: MEDICARE

## 2023-06-28 DIAGNOSIS — I10 ESSENTIAL HYPERTENSION WITH GOAL BLOOD PRESSURE LESS THAN 140/90: Primary | ICD-10-CM

## 2023-06-28 NOTE — PROGRESS NOTES
Taqueria Bone has an upcoming lab appointment for PVL. Please place orders as needed thank you!     Future Appointments   Date Time Provider Department Center   7/8/2023  9:45 AM [facility]   [facility]2023 12:00 PM [facility]           Adriana Waddell

## 2023-07-08 ENCOUNTER — LAB (OUTPATIENT)
Dept: LAB | Facility: CLINIC | Age: 78
End: 2023-07-08
Payer: MEDICARE

## 2023-07-08 DIAGNOSIS — I10 ESSENTIAL HYPERTENSION WITH GOAL BLOOD PRESSURE LESS THAN 140/90: ICD-10-CM

## 2023-07-08 LAB
ALBUMIN SERPL BCG-MCNC: 4.7 G/DL (ref 3.5–5.2)
ANION GAP SERPL CALCULATED.3IONS-SCNC: 7 MMOL/L (ref 7–15)
BUN SERPL-MCNC: 15.9 MG/DL (ref 8–23)
CALCIUM SERPL-MCNC: 10.3 MG/DL (ref 8.8–10.2)
CHLORIDE SERPL-SCNC: 104 MMOL/L (ref 98–107)
CREAT SERPL-MCNC: 0.88 MG/DL (ref 0.67–1.17)
DEPRECATED HCO3 PLAS-SCNC: 30 MMOL/L (ref 22–29)
GFR SERPL CREATININE-BSD FRML MDRD: 89 ML/MIN/1.73M2
GLUCOSE SERPL-MCNC: 117 MG/DL (ref 70–99)
PHOSPHATE SERPL-MCNC: 3.1 MG/DL (ref 2.5–4.5)
POTASSIUM SERPL-SCNC: 5.2 MMOL/L (ref 3.4–5.3)
SODIUM SERPL-SCNC: 141 MMOL/L (ref 136–145)

## 2023-07-08 PROCEDURE — 80069 RENAL FUNCTION PANEL: CPT

## 2023-07-08 PROCEDURE — 36415 COLL VENOUS BLD VENIPUNCTURE: CPT

## 2023-07-17 ENCOUNTER — PATIENT OUTREACH (OUTPATIENT)
Dept: CARE COORDINATION | Facility: CLINIC | Age: 78
End: 2023-07-17
Payer: MEDICARE

## 2023-07-18 ENCOUNTER — OFFICE VISIT (OUTPATIENT)
Dept: FAMILY MEDICINE | Facility: CLINIC | Age: 78
End: 2023-07-18
Payer: MEDICARE

## 2023-07-18 VITALS
HEART RATE: 98 BPM | OXYGEN SATURATION: 96 % | WEIGHT: 187.4 LBS | SYSTOLIC BLOOD PRESSURE: 137 MMHG | RESPIRATION RATE: 18 BRPM | TEMPERATURE: 98.6 F | BODY MASS INDEX: 29.41 KG/M2 | HEIGHT: 67 IN | DIASTOLIC BLOOD PRESSURE: 77 MMHG

## 2023-07-18 DIAGNOSIS — E78.5 HYPERLIPIDEMIA LDL GOAL <130: ICD-10-CM

## 2023-07-18 DIAGNOSIS — I10 HYPERTENSION GOAL BP (BLOOD PRESSURE) < 140/90: ICD-10-CM

## 2023-07-18 PROCEDURE — 99214 OFFICE O/P EST MOD 30 MIN: CPT | Performed by: FAMILY MEDICINE

## 2023-07-18 RX ORDER — AMLODIPINE BESYLATE 5 MG/1
10 TABLET ORAL DAILY
Qty: 180 TABLET | Refills: 3 | Status: SHIPPED | OUTPATIENT
Start: 2023-07-18 | End: 2024-08-06

## 2023-07-18 RX ORDER — ATORVASTATIN CALCIUM 20 MG/1
TABLET, FILM COATED ORAL
Qty: 90 TABLET | Refills: 3 | Status: SHIPPED | OUTPATIENT
Start: 2023-07-18 | End: 2024-08-06

## 2023-07-18 RX ORDER — LOSARTAN POTASSIUM 50 MG/1
50 TABLET ORAL DAILY
Qty: 90 TABLET | Refills: 1 | Status: SHIPPED | OUTPATIENT
Start: 2023-07-18 | End: 2024-02-05

## 2023-07-18 ASSESSMENT — PAIN SCALES - GENERAL: PAINLEVEL: MODERATE PAIN (5)

## 2023-07-18 NOTE — PROGRESS NOTES
"  ASSESSMENT / PLAN:  (I10) Hypertension goal BP (blood pressure) < 140/90  Comment: stable  Plan: amLODIPine (NORVASC) 5 MG tablet, losartan         (COZAAR) 50 MG tablet        Continue exercise and self-monitor. Recheck in 6 months  For wellness exam. Sooner if worse/new issues. Chest pain or shortness of breath to er    (E78.5) Hyperlipidemia LDL goal <130  Comment: stalbe in past  Plan: atorvastatin (LIPITOR) 20 MG tablet        Recheck in 6 months          Tanya Meng is a 77 year old, presenting for the following health issues:  Recheck Medication (refills) and Results  Follow-up htn, ed, hyperglycemia, ALLERGIC RHINITIS,barrets and high cholesterol.  Taking MVI and one glass/milk.   No chest pain or shortness of breath. Active insummer. Walking and gardening and working on [employer]. Walking dog.   No nausea, vomiting or diarrhea or black/bloody stools. No urine chages or hematuria.  Family ok. No feet changes.  Eye exam - in summer.   No dysphagia and gerd stable.   Outside blood pressure  Readings ok.   Going to [place] for Friend[place].       7/18/2023    11:49 AM   Additional Questions   Roomed by STEPHANY Archibald CMA     History of Present Illness       Reason for visit:  The doctor wants to see me before writing new prescription s    He eats 2-3 servings of fruits and vegetables daily.He consumes 0 sweetened beverage(s) daily.He exercises with enough effort to increase his heart rate 30 to 60 minutes per day.  He exercises with enough effort to increase his heart rate 6 days per week.   He is taking medications regularly.             Objective    Physical Exam   /77   Pulse 98   Temp 98.6  F (37  C) (Oral)   Resp 18   Ht 1.702 m (5' 7\")   Wt 85 kg (187 lb 6.4 oz)   SpO2 96%   BMI 29.35 kg/m      GENERAL: healthy, alert and no distress  EYES: Eyes grossly normal to inspection, PERRL and conjunctivae and sclerae normal  HENT: ear canals and TM's normal, nose and mouth without ulcers or lesions  NECK: no " adenopathy, no asymmetry, masses, or scars and thyroid normal to palpation  RESP: lungs clear to auscultation - no rales, rhonchi or wheezes  CV: regular rate and rhythm, normal S1 S2, no S3 or S4, no murmur, click or rub, no peripheral edema and peripheral pulses strong  ABDOMEN: soft, nontender, no hepatosplenomegaly, no masses and bowel sounds normal  MS: no gross musculoskeletal defects noted, no edema  PSYCH: mentation appears normal, affect normal/bright

## 2023-07-31 ENCOUNTER — PATIENT OUTREACH (OUTPATIENT)
Dept: CARE COORDINATION | Facility: CLINIC | Age: 78
End: 2023-07-31
Payer: MEDICARE

## 2023-08-09 ENCOUNTER — TELEPHONE (OUTPATIENT)
Dept: FAMILY MEDICINE | Facility: CLINIC | Age: 78
End: 2023-08-09

## 2023-08-09 ENCOUNTER — E-VISIT (OUTPATIENT)
Dept: URGENT CARE | Facility: CLINIC | Age: 78
End: 2023-08-09
Payer: MEDICARE

## 2023-08-09 DIAGNOSIS — R21 RASH: Primary | ICD-10-CM

## 2023-08-09 DIAGNOSIS — J01.90 ACUTE SINUSITIS, RECURRENCE NOT SPECIFIED, UNSPECIFIED LOCATION: Primary | ICD-10-CM

## 2023-08-09 PROCEDURE — 99207 PR NON-BILLABLE SERV PER CHARTING: CPT | Performed by: NURSE PRACTITIONER

## 2023-08-09 RX ORDER — CLOTRIMAZOLE AND BETAMETHASONE DIPROPIONATE 10; .64 MG/G; MG/G
CREAM TOPICAL DAILY PRN
Qty: 15 G | Refills: 0 | Status: SHIPPED | OUTPATIENT
Start: 2023-08-09

## 2023-08-09 NOTE — PATIENT INSTRUCTIONS
Dear Taqueria Bone    After reviewing your responses, I've been able to diagnose you with Acute sinusitis, recurrence not specified, unspecified location.        It is also important to stay well hydrated, get lots of rest and take over-the-counter decongestants,?tylenol?or ibuprofen if you?are able to?take those medications per your primary care provider to help relieve discomfort.?       If your symptoms worsen, you develop severe headache, vomiting, high fever (>102), or are not improving in 7 days, please contact your primary care provider for an appointment or visit any of our convenient Walk-in Care or Urgent Care Centers to be seen which can be found on our website?here.?     Thanks again for choosing?us?as your health care partner,?   ?  NORMAN Kate CNP?

## 2023-08-09 NOTE — TELEPHONE ENCOUNTER
New Medication Request        What medication are you requesting?:   CLOTRIMAZOLE-BETAMETHASONE EX     No   Class: Historical     Reason for medication request: Was prescribed for bilateral ear itching years ago.    Have you taken this medication before?: Yes    Controlled Substance Agreement on file:   CSA -- Patient Level:    CSA: None found at the patient level.         Patient offered an appointment? No, thought he mentioned needing a refill when he saw Dr. Jordan on 7/18/23.    Preferred Pharmacy:     [facility] #[id number] [address] & [address] [address]  Phone: [phone] Fax: [phone]    Could we send this information to you in [facility] or would you prefer to receive a phone call?:   Patient would prefer a phone call   Okay to leave a detailed message?: Yes at Home number on file [phone] (home)

## 2023-11-12 ENCOUNTER — HEALTH MAINTENANCE LETTER (OUTPATIENT)
Age: 78
End: 2023-11-12

## 2023-12-20 ENCOUNTER — ANCILLARY PROCEDURE (OUTPATIENT)
Dept: GENERAL RADIOLOGY | Facility: CLINIC | Age: 78
End: 2023-12-20
Attending: PHYSICIAN ASSISTANT
Payer: MEDICARE

## 2023-12-20 ENCOUNTER — OFFICE VISIT (OUTPATIENT)
Dept: URGENT CARE | Facility: URGENT CARE | Age: 78
End: 2023-12-20
Payer: MEDICARE

## 2023-12-20 VITALS
TEMPERATURE: 98.4 F | OXYGEN SATURATION: 96 % | SYSTOLIC BLOOD PRESSURE: 147 MMHG | HEART RATE: 97 BPM | DIASTOLIC BLOOD PRESSURE: 90 MMHG | RESPIRATION RATE: 22 BRPM

## 2023-12-20 DIAGNOSIS — J06.9 VIRAL URI WITH COUGH: ICD-10-CM

## 2023-12-20 DIAGNOSIS — R05.1 ACUTE COUGH: Primary | ICD-10-CM

## 2023-12-20 DIAGNOSIS — R05.1 ACUTE COUGH: ICD-10-CM

## 2023-12-20 LAB — DEPRECATED S PYO AG THROAT QL EIA: NEGATIVE

## 2023-12-20 PROCEDURE — 71046 X-RAY EXAM CHEST 2 VIEWS: CPT | Mod: TC | Performed by: STUDENT IN AN ORGANIZED HEALTH CARE EDUCATION/TRAINING PROGRAM

## 2023-12-20 PROCEDURE — 99214 OFFICE O/P EST MOD 30 MIN: CPT | Performed by: PHYSICIAN ASSISTANT

## 2023-12-20 PROCEDURE — 87651 STREP A DNA AMP PROBE: CPT | Performed by: PHYSICIAN ASSISTANT

## 2023-12-20 PROCEDURE — 87637 SARSCOV2&INF A&B&RSV AMP PRB: CPT | Performed by: PHYSICIAN ASSISTANT

## 2023-12-20 RX ORDER — ALBUTEROL SULFATE 90 UG/1
2 AEROSOL, METERED RESPIRATORY (INHALATION) EVERY 4 HOURS PRN
Qty: 18 G | Refills: 0 | Status: SHIPPED | OUTPATIENT
Start: 2023-12-20 | End: 2024-01-02

## 2023-12-20 RX ORDER — BENZONATATE 100 MG/1
CAPSULE ORAL
Qty: 30 CAPSULE | Refills: 0 | Status: SHIPPED | OUTPATIENT
Start: 2023-12-20 | End: 2024-08-06

## 2023-12-21 LAB
FLUAV RNA SPEC QL NAA+PROBE: NEGATIVE
FLUBV RNA RESP QL NAA+PROBE: NEGATIVE
GROUP A STREP BY PCR: NOT DETECTED
RSV RNA SPEC NAA+PROBE: NEGATIVE
SARS-COV-2 RNA RESP QL NAA+PROBE: NEGATIVE

## 2023-12-21 NOTE — PATIENT INSTRUCTIONS
Tessalon Perles- take 1-2 capsules 3 times daily as needed for cough.  Albuterol 2 puffs every 4 hours as needed for shortness of breath.    No indication for antibiotics discussed.   Rest! Your body needs more rest to heal.  Drink plenty of fluids (warm fluids like tea or soup are soothing and reduce cough)  Sit in the bathroom with a hot shower running and breathe in the steam.  Honey may soothe your sore throat and help manage your cough- may take straight or in warm water with lemon juice.  Avoid smoke (cigarettes, bonfires, fireplace, wood burning stoves).  Take Tylenol or an NSAID such as ibuprofen or naproxen as needed for pain.  Delsym (dextromethorphan polistirex) is an over the counter cough medication that lasts 12 hours.   Mucinex or Robitussin (guiafenesin) thin mucus and may help it to loosen more quickly  Good handwashing is the best way to prevent spread of germs  Present to emergency room if you develop trouble breathing, swallowing or cough-up blood.  Follow up with your primary care provider if symptoms worsen or fail to improve as expected.

## 2023-12-21 NOTE — PROGRESS NOTES
Assessment & Plan     Viral URI with cough  - benzonatate (TESSALON) 100 MG capsule; Take 1-2 capsules by mouth up to 3 times daily as needed for cough.  - albuterol (PROAIR HFA/PROVENTIL HFA/VENTOLIN HFA) 108 (90 Base) MCG/ACT inhaler; Inhale 2 puffs into the lungs every 4 hours as needed for shortness of breath or wheezing    Acute cough  - Symptomatic Influenza A/B, RSV, & SARS-CoV2 PCR (COVID-19); Future  - Streptococcus A Rapid Screen w/Reflex to PCR - Clinic Collect  - XR Chest 2 Views; Future  - Group A Streptococcus PCR Throat Swab  - Symptomatic Influenza A/B, RSV, & SARS-CoV2 PCR (COVID-19) Nose        Chest x-ray normal by my read, physical exam normal.  We discussed symptomatic measures including fluids, rest, Tylenol, ibuprofen.  Tessalon Perles and albuterol for cough.  Follow-up to be seen if symptoms significantly worsen or fail to improve.  Influenza RSV COVID and strep are all negative.    Return in about 1 week (around 12/27/2023) for visit with primary care provider if not improving.     SAMANTHA Parker [facility]    Subjective   Taqueria Bone is a 78 year old who presents for the following health issues     Patient presents with:  Cough: Hacking cough x8 days      HPI    Taqueria presents clinic today for evaluation of a cough.  Symptoms first began about 9 days ago.  His cough has been productive and worse at night.  It feels it is coming from his 10th chest versus a tickle in his throat.  No shortness of breath.      Review of Systems   ROS negative except as stated above.      Objective    BP (!) 147/90   Pulse 97   Temp 98.4  F (36.9  C) (Tympanic)   Resp 22   SpO2 96%   Physical Exam   GENERAL: healthy, alert and no distress  EYES: Eyes grossly normal to inspection, PERRL and conjunctivae and sclerae normal  HENT: ear canals and TM's normal, nose and mouth without ulcers or lesions  NECK: no adenopathy, no asymmetry, masses, or scars and thyroid  normal to palpation  RESP: lungs clear to auscultation - no rales, rhonchi or wheezes  CV: regular rate and rhythm, normal S1 S2, no S3 or S4, no murmur, click or rub, no peripheral edema and peripheral pulses strong    Results for orders placed or performed in visit on 12/20/23   XR Chest 2 Views     Status: None    Narrative    EXAM: XR CHEST 2 VIEWS  LOCATION: [facility] [place]  DATE: 12/20/2023    INDICATION: Cough x 9 days, productive, rule out pneumonia.  COMPARISON: Chest radiograph 5/2/2015.      Impression    IMPRESSION: No focal consolidation, pleural effusion or pneumothorax. Cardiomediastinal silhouette is unremarkable. Mild degenerative changes of the thoracic spine.   Results for orders placed or performed in visit on 12/20/23   Symptomatic Influenza A/B, RSV, & SARS-CoV2 PCR (COVID-19) Nose     Status: Normal    Specimen: Nose; Swab   Result Value Ref Range    Influenza A PCR Negative Negative    Influenza B PCR Negative Negative    RSV PCR Negative Negative    SARS CoV2 PCR Negative Negative    Narrative    Testing was performed using the Xpert Xpress CoV2/Flu/RSV Assay on the [employer] Gene[employer]pert Instrument. This test should be ordered for the detection of SARS-CoV-2, influenza, and RSV viruses in individuals who meet clinical and/or epidemiological criteria. Test performance is unknown in asymptomatic patients. This test is for in vitro diagnostic use under the FDA EUA for laboratories certified under CLIA to perform high or moderate complexity testing. This test has not been FDA cleared or approved. A negative result does not rule out the presence of PCR inhibitors in the specimen or target RNA in concentration below the limit of detection for the assay. If only one viral target is positive but coinfection with multiple targets is suspected, the sample should be re-tested with another FDA cleared, approved, or authorized test, if coinfection would change clinical management. This test  was validated by the [facility]. These laboratories are certified under the Clinical Laboratory Improvement Amendments of 1988 (CLIA-88) as qualified to perform high complexity laboratory testing.   Streptococcus A Rapid Screen w/Reflex to PCR - Clinic Collect     Status: Normal    Specimen: Throat; Swab   Result Value Ref Range    Group A Strep antigen Negative Negative   Group A Streptococcus PCR Throat Swab     Status: Normal    Specimen: Throat; Swab   Result Value Ref Range    Group A strep by PCR Not Detected Not Detected    Narrative    The Xpert Xpress Strep A test, performed on the [employer], is a rapid, qualitative in vitro diagnostic test for the detection of Streptococcus pyogenes (Group A ß-hemolytic Streptococcus, Strep A) in throat swab specimens from patients with signs and symptoms of pharyngitis. The Xpert Xpress Strep A test can be used as an aid in the diagnosis of Group A Streptococcal pharyngitis. The assay is not intended to monitor treatment for Group A Streptococcus infections. The Xpert Xpress Strep A test utilizes an automated real-time polymerase chain reaction (PCR) to detect Streptococcus pyogenes DNA.

## 2024-01-02 DIAGNOSIS — J06.9 VIRAL URI WITH COUGH: ICD-10-CM

## 2024-01-02 RX ORDER — ALBUTEROL SULFATE 90 UG/1
2 AEROSOL, METERED RESPIRATORY (INHALATION) EVERY 4 HOURS PRN
Qty: 18 G | Refills: 0 | Status: SHIPPED | OUTPATIENT
Start: 2024-01-02 | End: 2024-08-06

## 2024-01-17 ENCOUNTER — OFFICE VISIT (OUTPATIENT)
Dept: OPHTHALMOLOGY | Facility: CLINIC | Age: 79
End: 2024-01-17
Payer: MEDICARE

## 2024-01-17 DIAGNOSIS — H52.4 PRESBYOPIA: ICD-10-CM

## 2024-01-17 DIAGNOSIS — H35.3131 EARLY DRY STAGE NONEXUDATIVE AGE-RELATED MACULAR DEGENERATION OF BOTH EYES: ICD-10-CM

## 2024-01-17 DIAGNOSIS — H40.003 GLAUCOMA SUSPECT OF BOTH EYES: ICD-10-CM

## 2024-01-17 DIAGNOSIS — Z96.1 PSEUDOPHAKIA: Primary | ICD-10-CM

## 2024-01-17 DIAGNOSIS — H43.813 POSTERIOR VITREOUS DETACHMENT OF BOTH EYES: ICD-10-CM

## 2024-01-17 DIAGNOSIS — Z01.01 ENCOUNTER FOR EXAMINATION OF EYES AND VISION WITH ABNORMAL FINDINGS: ICD-10-CM

## 2024-01-17 DIAGNOSIS — H35.52 MACULAR PIGMENT DEPOSIT: ICD-10-CM

## 2024-01-17 PROCEDURE — 92015 DETERMINE REFRACTIVE STATE: CPT | Mod: GY | Performed by: OPHTHALMOLOGY

## 2024-01-17 PROCEDURE — 92014 COMPRE OPH EXAM EST PT 1/>: CPT | Performed by: OPHTHALMOLOGY

## 2024-01-17 ASSESSMENT — REFRACTION_WEARINGRX
OS_CYLINDER: +0.75
OD_SPHERE: +2.50
OD_ADD: +2.75
OS_ADD: +2.75
OD_ADD: +2.75
OS_SPHERE: -2.25
OD_CYLINDER: +0.50
OS_AXIS: 152
OD_CYLINDER: SPHERE
OS_AXIS: 152
SPECS_TYPE: READER
OD_SPHERE: PLANO
OS_CYLINDER: +0.75
SPECS_TYPE: BIFOCAL-LINED
OD_SPHERE: +2.75
OD_SPHERE: -0.50
OD_CYLINDER: +0.50
OS_AXIS: 154
OS_CYLINDER: +1.00
OS_CYLINDER: +1.00
OD_AXIS: 070
OS_SPHERE: -2.00
OD_AXIS: 066
OD_CYLINDER: SPHERE
OS_SPHERE: +0.50
OS_SPHERE: +0.50
OS_AXIS: 149
OS_ADD: +2.75
SPECS_TYPE: READER
SPECS_TYPE: BIFOCAL-LINED

## 2024-01-17 ASSESSMENT — TONOMETRY
IOP_METHOD: APPLANATION
OS_IOP_MMHG: 17
OD_IOP_MMHG: 14

## 2024-01-17 ASSESSMENT — CONF VISUAL FIELD
OS_SUPERIOR_NASAL_RESTRICTION: 0
OD_SUPERIOR_NASAL_RESTRICTION: 0
OS_NORMAL: 1
OD_SUPERIOR_TEMPORAL_RESTRICTION: 0
OS_INFERIOR_TEMPORAL_RESTRICTION: 0
OD_INFERIOR_TEMPORAL_RESTRICTION: 0
OD_INFERIOR_NASAL_RESTRICTION: 0
OS_INFERIOR_NASAL_RESTRICTION: 0
OD_NORMAL: 1
OS_SUPERIOR_TEMPORAL_RESTRICTION: 0

## 2024-01-17 ASSESSMENT — REFRACTION_MANIFEST
OD_AXIS: 020
OD_ADD: +2.75
OD_CYLINDER: +1.50
OS_ADD: +2.75
OD_SPHERE: -0.25
OS_AXIS: 164
OS_CYLINDER: +1.50
OS_SPHERE: -1.75

## 2024-01-17 ASSESSMENT — VISUAL ACUITY
OS_CC+: -1
METHOD: SNELLEN - LINEAR
CORRECTION_TYPE: GLASSES
OD_CC+: -1
OD_CC: 20/25
OS_CC: 20/20

## 2024-01-17 ASSESSMENT — CUP TO DISC RATIO
OS_RATIO: 0.4
OD_RATIO: 0.5

## 2024-01-17 ASSESSMENT — EXTERNAL EXAM - RIGHT EYE: OD_EXAM: NORMAL

## 2024-01-17 ASSESSMENT — EXTERNAL EXAM - LEFT EYE: OS_EXAM: NORMAL

## 2024-01-17 NOTE — LETTER
"    1/17/2024         RE: Taqueria Bone  [address] [facility]  [place] [address]        Dear Colleague,    Thank you for referring your patient, Taqueria Bone, to the [facility]. Please see a copy of my visit note below.     Current Eye Medications:   Systane at bedtime both eyes Taking MVI and lutein.      Subjective:  here for complete eye exam. Has more floaters he thinks. Also feels that the right eye might need a YAG soon, is getting slightly blurred.  When he falls asleep, his eyelids do not close all the way.  Wife just had open heart surgery, he is helping her at home.      Objective:  See Ophthalmology Exam.       Assessment:  Stable eye exam.      ICD-10-CM    1. Pseudophakia, ou; Yag Caps, os  Z96.1       2. Glaucoma suspect of both eyes  H40.003       3. Early dry stage nonexudative age-related macular degeneration of both eyes  H35.3131       4. Macular pigment deposit, od  H35.52       5. Posterior vitreous detachment of both eyes  H43.813       6. Encounter for examination of eyes and vision with abnormal findings  Z01.01       7. Presbyopia  H52.4            Plan:  Glasses prescription given - optional    May use artificial tears up to four times a day (like Refresh Optive, Systane Balance, or TheraTears. Avoid \"get the red out\" drops and generic artifical tears).     Possible clouding of posterior capsule right eye discussed.     Take a multiple vitamin or \"eye vitamin\" daily (AREDS2)  Recommend alternate one with MVI daily.  Protect your eyes outdoors from ultraviolet rays with sunglasses and/or brimmed hat.  Have spinach (cooked or raw), colorful fruits, walnuts, hazelnuts, almonds in your diet.  Monitor the vision in each eye weekly - call if any sudden persistent changes.    Call in September 2024 for an appointment in January 2025 for Complete Exam    Dr. Honeycutt [phone]            Again, thank you for allowing me to participate in the care of your " patient.        Sincerely,        Ervin Honeycutt MD

## 2024-01-17 NOTE — PATIENT INSTRUCTIONS
"Glasses prescription given - optional    May use artificial tears up to four times a day (like Refresh Optive, Systane Balance, or TheraTears. Avoid \"get the red out\" drops and generic artifical tears).     Possible clouding of posterior capsule right eye discussed.     Take a multiple vitamin or \"eye vitamin\" daily (AREDS2).  Protect your eyes outdoors from ultraviolet rays with sunglasses and/or brimmed hat.  Have spinach (cooked or raw), colorful fruits, walnuts, hazelnuts, almonds in your diet.  Monitor the vision in each eye weekly - call if any sudden persistent changes.    Call in September 2024 for an appointment in January 2025 for Complete Exam    Dr. Honeycutt [phone]    "

## 2024-01-17 NOTE — PROGRESS NOTES
" Current Eye Medications:   Systane at bedtime both eyes Taking MVI and lutein.      Subjective:  here for complete eye exam. Has more floaters he thinks. Also feels that the right eye might need a YAG soon, is getting slightly blurred.  When he falls asleep, his eyelids do not close all the way.  Wife just had open heart surgery, he is helping her at home.      Objective:  See Ophthalmology Exam.       Assessment:  Stable eye exam.      ICD-10-CM    1. Pseudophakia, ou; Yag Caps, os  Z96.1       2. Glaucoma suspect of both eyes  H40.003       3. Early dry stage nonexudative age-related macular degeneration of both eyes  H35.3131       4. Macular pigment deposit, od  H35.52       5. Posterior vitreous detachment of both eyes  H43.813       6. Encounter for examination of eyes and vision with abnormal findings  Z01.01       7. Presbyopia  H52.4            Plan:  Glasses prescription given - optional    May use artificial tears up to four times a day (like Refresh Optive, Systane Balance, or TheraTears. Avoid \"get the red out\" drops and generic artifical tears).     Possible clouding of posterior capsule right eye discussed.     Take a multiple vitamin or \"eye vitamin\" daily (AREDS2)  Recommend alternate one with MVI daily.  Protect your eyes outdoors from ultraviolet rays with sunglasses and/or brimmed hat.  Have spinach (cooked or raw), colorful fruits, walnuts, hazelnuts, almonds in your diet.  Monitor the vision in each eye weekly - call if any sudden persistent changes.    Call in September 2024 for an appointment in January 2025 for Complete Exam    Dr. Honeycutt [phone]        "

## 2024-02-02 RX ORDER — RESPIRATORY SYNCYTIAL VIRUS VACCINE 120MCG/0.5
0.5 KIT INTRAMUSCULAR ONCE
Qty: 1 EACH | Refills: 0 | Status: CANCELLED | OUTPATIENT
Start: 2024-02-02 | End: 2024-02-02

## 2024-02-05 ENCOUNTER — OFFICE VISIT (OUTPATIENT)
Dept: FAMILY MEDICINE | Facility: CLINIC | Age: 79
End: 2024-02-05
Payer: MEDICARE

## 2024-02-05 VITALS
WEIGHT: 192.8 LBS | BODY MASS INDEX: 29.22 KG/M2 | HEART RATE: 89 BPM | TEMPERATURE: 97.9 F | OXYGEN SATURATION: 94 % | SYSTOLIC BLOOD PRESSURE: 138 MMHG | RESPIRATION RATE: 16 BRPM | HEIGHT: 68 IN | DIASTOLIC BLOOD PRESSURE: 72 MMHG

## 2024-02-05 DIAGNOSIS — I10 HYPERTENSION GOAL BP (BLOOD PRESSURE) < 140/90: ICD-10-CM

## 2024-02-05 DIAGNOSIS — E78.5 HYPERLIPIDEMIA LDL GOAL <130: ICD-10-CM

## 2024-02-05 PROCEDURE — 99214 OFFICE O/P EST MOD 30 MIN: CPT | Performed by: FAMILY MEDICINE

## 2024-02-05 RX ORDER — LOSARTAN POTASSIUM 50 MG/1
50 TABLET ORAL DAILY
Qty: 90 TABLET | Refills: 1 | Status: SHIPPED | OUTPATIENT
Start: 2024-02-05 | End: 2024-08-06

## 2024-02-05 ASSESSMENT — PAIN SCALES - GENERAL: PAINLEVEL: MILD PAIN (3)

## 2024-02-05 NOTE — PROGRESS NOTES
"ASSESSMENT / PLAN:  (E78.5) Hyperlipidemia LDL goal <130  Comment: stable in past  Plan: continue statin. Continue exercise and lower carb diet. Chest pain or shortness of breath to er. Recheck in 6 months  Fasting before wellness exam    (I10) Hypertension goal BP (blood pressure) < 140/90  Comment: a little high  Plan: losartan (COZAAR) 50 MG tablet        Exercise and continue self-monitor. Will double dosage if worse. Limit sodium. Recheck in 6 months  Sooner if worse. Call/email with questions/concerns        Subjective   Taqueria is a 78 year old, presenting for the following health issues:  Follow-up htn, ed, hyperglycemia, ALLERGIC RHINITIS,barrets and high cholesterol.   Exercise - walking. Eating salmon.   Lift weights. Rubber bands. Wife's some health issues.   . 3 kids, 3 grandkids - in [place].   No nausea, vomiting or diarrhea or black/bloody stools. No urine changes or hematuria.   Gerd ok or dysphasia. No chest pain or shortness of breath.   Great exercise tolerance.  Outside blood pressure reading ok.   Lower carb diet. No pop.   Hypertension      2/5/2024    11:48 AM   Additional Questions   Roomed by STEPHANY Archibald CMA     History of Present Illness       Hypertension: He presents for follow up of hypertension.  He does check blood pressure  regularly outside of the clinic. Outside blood pressures have been over 140/90. He follows a low salt diet.     Reason for visit:  Follow up    He eats 2-3 servings of fruits and vegetables daily.He consumes 0 sweetened beverage(s) daily.He exercises with enough effort to increase his heart rate 30 to 60 minutes per day.  He exercises with enough effort to increase his heart rate 6 days per week.   He is taking medications regularly.             Objective    /72   Pulse 89   Temp 97.9  F (36.6  C) (Oral)   Resp 16   Ht 1.727 m (5' 8\")   Wt 87.5 kg (192 lb 12.8 oz)   SpO2 94%   BMI 29.32 kg/m     Physical Exam   GENERAL: alert and no distress  EYES: Eyes " grossly normal to inspection, PERRL and conjunctivae and sclerae normal  NECK: no adenopathy, no asymmetry, masses, or scars  RESP: lungs clear to auscultation - no rales, rhonchi or wheezes  CV: regular rate and rhythm, normal S1 S2, no S3 or S4, no murmur, click or rub, no peripheral edema   ABDOMEN: soft, nontender, no hepatosplenomegaly, no masses and bowel sounds normal  MS: no gross musculoskeletal defects noted, no edema  NEURO: Normal strength and tone, mentation intact and speech normal  PSYCH: mentation appears normal, affect normal/bright          Signed Electronically by: Emmanuel Jordan MD

## 2024-02-27 ENCOUNTER — TRANSFERRED RECORDS (OUTPATIENT)
Dept: HEALTH INFORMATION MANAGEMENT | Facility: CLINIC | Age: 79
End: 2024-02-27
Payer: MEDICARE

## 2024-04-30 ENCOUNTER — TELEPHONE (OUTPATIENT)
Dept: FAMILY MEDICINE | Facility: CLINIC | Age: 79
End: 2024-04-30
Payer: MEDICARE

## 2024-04-30 ENCOUNTER — MYC REFILL (OUTPATIENT)
Dept: FAMILY MEDICINE | Facility: CLINIC | Age: 79
End: 2024-04-30
Payer: MEDICARE

## 2024-04-30 DIAGNOSIS — R09.81 SINUS CONGESTION: ICD-10-CM

## 2024-04-30 RX ORDER — FLUTICASONE PROPIONATE 50 MCG
SPRAY, SUSPENSION (ML) NASAL
Qty: 48 G | Refills: 2 | Status: SHIPPED | OUTPATIENT
Start: 2024-04-30 | End: 2024-08-06

## 2024-04-30 NOTE — TELEPHONE ENCOUNTER
Medication Question or Refill    Contacts         Type Contact Phone/Fax    04/30/2024 01:12 PM CDT Phone (Incoming) Taqueria Bone (Self) [phone] (H)            What medication are you calling about (include dose and sig)?: flonase nasal spray    Preferred Pharmacy  [facility] - [place], [place] - [address] [place] [id number]  Phone: [phone] Fax: [phone]      Controlled Substance Agreement on file:   CSA -- Patient Level:    CSA: None found at the patient level.       Who prescribed the medication?: Dr Jordan    Do you need a refill? Yes    When did you use the medication last?     Patient offered an appointment? No    Do you have any questions or concerns?  No      Could we send this information to you in [facility] or would you prefer to receive a phone call?:   Patient would prefer a phone call   Okay to leave a detailed message?: Yes at Home number on file [phone] (home)    Shannan HONEYCUTT [facility]

## 2024-05-20 ENCOUNTER — OFFICE VISIT (OUTPATIENT)
Dept: URGENT CARE | Facility: URGENT CARE | Age: 79
End: 2024-05-20
Payer: MEDICARE

## 2024-05-20 VITALS
WEIGHT: 192 LBS | DIASTOLIC BLOOD PRESSURE: 84 MMHG | TEMPERATURE: 97.8 F | HEART RATE: 93 BPM | BODY MASS INDEX: 29.19 KG/M2 | RESPIRATION RATE: 15 BRPM | SYSTOLIC BLOOD PRESSURE: 148 MMHG | OXYGEN SATURATION: 98 %

## 2024-05-20 DIAGNOSIS — R68.84 JAW PAIN: Primary | ICD-10-CM

## 2024-05-20 PROCEDURE — 99213 OFFICE O/P EST LOW 20 MIN: CPT | Performed by: STUDENT IN AN ORGANIZED HEALTH CARE EDUCATION/TRAINING PROGRAM

## 2024-05-20 NOTE — PATIENT INSTRUCTIONS
Good seeing you in clinic today. I am not sure exactly what's causing your jaw pain. I would recommend trying tylenol, and heat or ice packs to see if that helps improve your symptoms. I would recommend following up with your PCP to see if any additional imaging might be helpful in the next week. Please go to the ED to be evaluated if the pain significantly worsens.

## 2024-05-20 NOTE — PROGRESS NOTES
"  Assessment & Plan     Jaw pain  Unclear etiology of patient's jaw/ear pain. No evidence of acute otitis media, otitis externa or other ear pathologies on exam. No evidence of parotitis, or skin abnormalities. Possibility of musculoskeletal pain from jaw being referred to the ear.  - Recommended conservative treatment with ice/heat as well as ibuprofen or Tylenol  - Primary Care Referral for follow-up in 1 week if symptoms or not improving as patient could require further evaluation with advanced imaging    BMI  Estimated body mass index is 29.19 kg/m  as calculated from the following:    Height as of 2/5/24: 1.727 m (5' 8\").    Weight as of this encounter: 87.1 kg (192 lb).           Return in about 1 week (around 5/27/2024), or with PCP.    Tanya Meng is a 78 year old, presenting for the following health issues:  Otalgia (Rt ear pain for 8 days. Started in jaw then went to ear. Heavy and achy feeling. )    HPI     Rt jaw/ear pain for the last 8 days. Started in jaw now radiating to his ear as well. No fevers. Endorses possible impending vertigo, but none yet. Reports that pain is a dull ache with occasional spikes with certain movements of his jaw.    Otherwise denies fevers, chills, presyncope, vision changes, chest pain, new shortness of breath, abdominal pain, nausea, vomiting, constipation, diarrhea, dysuria, hematuria, hematochezia, melena    No recent illness.         Objective    BP (!) 148/84 (BP Location: Left arm, Cuff Size: Adult Regular)   Pulse 93   Temp 97.8  F (36.6  C) (Tympanic)   Resp 15   Wt 87.1 kg (192 lb)   SpO2 98%   BMI 29.19 kg/m    Body mass index is 29.19 kg/m .  Physical Exam   Constitutional: No Acute Distress. Awake and alert  Eyes: anicteric, EOMI, PERRLA  ENT: oropharynx clear, MMM, no Cervical LAD, TMs within normal limits bilaterally  Respiratory: good air movement, clear to auscultation bilaterally, no crackles or wheezing  Cardiovascular: regular rate and rhythm, " normal S1 and S2, no murmur noted  GI: normal bowel sounds, soft, non-distended, non-tender, no masses palpated, no hepatosplenomegaly  Skin: No rashes, or suspicious lesions  Musculoskeletal: No pedal edema  Neurologic: no focal neurologic deficits appreciated          Signed Electronically by: Yehuda Arzate MD

## 2024-07-13 DIAGNOSIS — R42 VERTIGO: ICD-10-CM

## 2024-07-13 DIAGNOSIS — N52.9 ERECTILE DYSFUNCTION, UNSPECIFIED ERECTILE DYSFUNCTION TYPE: ICD-10-CM

## 2024-07-15 RX ORDER — MECLIZINE HYDROCHLORIDE 25 MG/1
25 TABLET ORAL 3 TIMES DAILY PRN
Qty: 30 TABLET | Refills: 0 | Status: SHIPPED | OUTPATIENT
Start: 2024-07-15 | End: 2024-07-26

## 2024-07-15 RX ORDER — SILDENAFIL CITRATE 20 MG/1
TABLET ORAL
Qty: 30 TABLET | Refills: 2 | Status: SHIPPED | OUTPATIENT
Start: 2024-07-15

## 2024-07-18 ENCOUNTER — TRANSFERRED RECORDS (OUTPATIENT)
Dept: HEALTH INFORMATION MANAGEMENT | Facility: CLINIC | Age: 79
End: 2024-07-18
Payer: MEDICARE

## 2024-07-22 ENCOUNTER — DOCUMENTATION ONLY (OUTPATIENT)
Dept: FAMILY MEDICINE | Facility: CLINIC | Age: 79
End: 2024-07-22
Payer: MEDICARE

## 2024-07-22 DIAGNOSIS — I10 ESSENTIAL HYPERTENSION WITH GOAL BLOOD PRESSURE LESS THAN 140/90: Primary | ICD-10-CM

## 2024-07-22 DIAGNOSIS — K21.9 GASTROESOPHAGEAL REFLUX DISEASE WITHOUT ESOPHAGITIS: ICD-10-CM

## 2024-07-22 DIAGNOSIS — Z13.6 CARDIOVASCULAR SCREENING; LDL GOAL LESS THAN 130: ICD-10-CM

## 2024-07-22 NOTE — PROGRESS NOTES
Taqueria Bone has an upcoming lab appointment:    Future Appointments   Date Time Provider Department Center   8/2/2024 10:00 AM AN [facility]   8/6/2024 11:30 AM Emmanuel Jordan MD [facility]     There is no order available. Please review and place either future orders or HMPO (Review of Health Maintenance Protocol Orders), as appropriate.    Health Maintenance Due   Topic    ANNUAL REVIEW OF HM ORDERS     LIPID     BMP      Gunjan BLAKET

## 2024-07-26 DIAGNOSIS — R42 VERTIGO: ICD-10-CM

## 2024-07-26 RX ORDER — MECLIZINE HYDROCHLORIDE 25 MG/1
TABLET ORAL
Qty: 30 TABLET | Refills: 1 | Status: SHIPPED | OUTPATIENT
Start: 2024-07-26

## 2024-08-02 ENCOUNTER — LAB (OUTPATIENT)
Dept: LAB | Facility: CLINIC | Age: 79
End: 2024-08-02
Payer: MEDICARE

## 2024-08-02 DIAGNOSIS — I10 ESSENTIAL HYPERTENSION WITH GOAL BLOOD PRESSURE LESS THAN 140/90: ICD-10-CM

## 2024-08-02 DIAGNOSIS — Z13.6 CARDIOVASCULAR SCREENING; LDL GOAL LESS THAN 130: ICD-10-CM

## 2024-08-02 LAB
ALBUMIN SERPL BCG-MCNC: 4.6 G/DL (ref 3.5–5.2)
ANION GAP SERPL CALCULATED.3IONS-SCNC: 9 MMOL/L (ref 7–15)
BUN SERPL-MCNC: 15.4 MG/DL (ref 8–23)
CALCIUM SERPL-MCNC: 10.4 MG/DL (ref 8.8–10.4)
CHLORIDE SERPL-SCNC: 102 MMOL/L (ref 98–107)
CHOLEST SERPL-MCNC: 114 MG/DL
CREAT SERPL-MCNC: 0.86 MG/DL (ref 0.67–1.17)
EGFRCR SERPLBLD CKD-EPI 2021: 89 ML/MIN/1.73M2
FASTING STATUS PATIENT QL REPORTED: YES
GLUCOSE SERPL-MCNC: 115 MG/DL (ref 70–99)
HCO3 SERPL-SCNC: 29 MMOL/L (ref 22–29)
HDLC SERPL-MCNC: 45 MG/DL
LDLC SERPL CALC-MCNC: 47 MG/DL
NONHDLC SERPL-MCNC: 69 MG/DL
PHOSPHATE SERPL-MCNC: 2.8 MG/DL (ref 2.5–4.5)
POTASSIUM SERPL-SCNC: 4.4 MMOL/L (ref 3.4–5.3)
SODIUM SERPL-SCNC: 140 MMOL/L (ref 135–145)
TRIGL SERPL-MCNC: 110 MG/DL

## 2024-08-02 PROCEDURE — 80069 RENAL FUNCTION PANEL: CPT

## 2024-08-02 PROCEDURE — 36415 COLL VENOUS BLD VENIPUNCTURE: CPT

## 2024-08-02 PROCEDURE — 80061 LIPID PANEL: CPT

## 2024-08-06 ENCOUNTER — OFFICE VISIT (OUTPATIENT)
Dept: FAMILY MEDICINE | Facility: CLINIC | Age: 79
End: 2024-08-06
Payer: MEDICARE

## 2024-08-06 VITALS
RESPIRATION RATE: 18 BRPM | BODY MASS INDEX: 28.79 KG/M2 | SYSTOLIC BLOOD PRESSURE: 137 MMHG | DIASTOLIC BLOOD PRESSURE: 85 MMHG | TEMPERATURE: 98.2 F | WEIGHT: 190 LBS | OXYGEN SATURATION: 96 % | HEART RATE: 97 BPM | HEIGHT: 68 IN

## 2024-08-06 DIAGNOSIS — I10 HYPERTENSION GOAL BP (BLOOD PRESSURE) < 140/90: ICD-10-CM

## 2024-08-06 DIAGNOSIS — Z00.00 ENCOUNTER FOR MEDICARE ANNUAL WELLNESS EXAM: Primary | ICD-10-CM

## 2024-08-06 DIAGNOSIS — E78.5 HYPERLIPIDEMIA LDL GOAL <130: ICD-10-CM

## 2024-08-06 DIAGNOSIS — R09.81 SINUS CONGESTION: ICD-10-CM

## 2024-08-06 PROCEDURE — G0439 PPPS, SUBSEQ VISIT: HCPCS | Performed by: FAMILY MEDICINE

## 2024-08-06 PROCEDURE — 99214 OFFICE O/P EST MOD 30 MIN: CPT | Mod: 25 | Performed by: FAMILY MEDICINE

## 2024-08-06 RX ORDER — AMLODIPINE BESYLATE 5 MG/1
10 TABLET ORAL DAILY
Qty: 180 TABLET | Refills: 2 | Status: SHIPPED | OUTPATIENT
Start: 2024-08-06

## 2024-08-06 RX ORDER — LOSARTAN POTASSIUM 50 MG/1
50 TABLET ORAL DAILY
Qty: 90 TABLET | Refills: 2 | Status: SHIPPED | OUTPATIENT
Start: 2024-08-06

## 2024-08-06 RX ORDER — FLUTICASONE PROPIONATE 50 MCG
SPRAY, SUSPENSION (ML) NASAL
Qty: 48 G | Refills: 2 | Status: SHIPPED | OUTPATIENT
Start: 2024-08-06

## 2024-08-06 RX ORDER — ATORVASTATIN CALCIUM 10 MG/1
10 TABLET, FILM COATED ORAL DAILY
Qty: 90 TABLET | Refills: 2 | Status: SHIPPED | OUTPATIENT
Start: 2024-08-06

## 2024-08-06 RX ORDER — ASPIRIN 325 MG
325 TABLET, DELAYED RELEASE (ENTERIC COATED) ORAL DAILY
COMMUNITY

## 2024-08-06 SDOH — HEALTH STABILITY: PHYSICAL HEALTH: ON AVERAGE, HOW MANY DAYS PER WEEK DO YOU ENGAGE IN MODERATE TO STRENUOUS EXERCISE (LIKE A BRISK WALK)?: 7 DAYS

## 2024-08-06 ASSESSMENT — SOCIAL DETERMINANTS OF HEALTH (SDOH): HOW OFTEN DO YOU GET TOGETHER WITH FRIENDS OR RELATIVES?: ONCE A WEEK

## 2024-08-06 ASSESSMENT — PAIN SCALES - GENERAL: PAINLEVEL: NO PAIN (0)

## 2024-08-06 NOTE — PATIENT INSTRUCTIONS
Patient Education   Preventive Care Advice   This is general advice given by our system to help you stay healthy. However, your care team may have specific advice just for you. Please talk to your care team about your preventive care needs.  Nutrition  Eat 5 or more servings of fruits and vegetables each day.  Try wheat bread, brown rice and whole grain pasta (instead of white bread, rice, and pasta).  Get enough calcium and vitamin D. Check the label on foods and aim for 100% of the RDA (recommended daily allowance).  Lifestyle  Exercise at least 150 minutes each week  (30 minutes a day, 5 days a week).  Do muscle strengthening activities 2 days a week. These help control your weight and prevent disease.  No smoking.  Wear sunscreen to prevent skin cancer.  Have a dental exam and cleaning every 6 months.  Yearly exams  See your health care team every year to talk about:  Any changes in your health.  Any medicines your care team has prescribed.  Preventive care, family planning, and ways to prevent chronic diseases.  Shots (vaccines)   HPV shots (up to age 26), if you've never had them before.  Hepatitis B shots (up to age 59), if you've never had them before.  COVID-19 shot: Get this shot when it's due.  Flu shot: Get a flu shot every year.  Tetanus shot: Get a tetanus shot every 10 years.  Pneumococcal, hepatitis A, and RSV shots: Ask your care team if you need these based on your risk.  Shingles shot (for age 50 and up)  General health tests  Diabetes screening:  Starting at age 35, Get screened for diabetes at least every 3 years.  If you are younger than age 35, ask your care team if you should be screened for diabetes.  Cholesterol test: At age 39, start having a cholesterol test every 5 years, or more often if advised.  Bone density scan (DEXA): At age 50, ask your care team if you should have this scan for osteoporosis (brittle bones).  Hepatitis C: Get tested at least once in your life.  STIs (sexually  transmitted infections)  Before age 24: Ask your care team if you should be screened for STIs.  After age 24: Get screened for STIs if you're at risk. You are at risk for STIs (including HIV) if:  You are sexually active with more than one person.  You don't use condoms every time.  You or a partner was diagnosed with a sexually transmitted infection.  If you are at risk for HIV, ask about PrEP medicine to prevent HIV.  Get tested for HIV at least once in your life, whether you are at risk for HIV or not.  Cancer screening tests  Cervical cancer screening: If you have a cervix, begin getting regular cervical cancer screening tests starting at age 21.  Breast cancer scan (mammogram): If you've ever had breasts, begin having regular mammograms starting at age 40. This is a scan to check for breast cancer.  Colon cancer screening: It is important to start screening for colon cancer at age 45.  Have a colonoscopy test every 10 years (or more often if you're at risk) Or, ask your provider about stool tests like a FIT test every year or Cologuard test every 3 years.  To learn more about your testing options, visit:   .  For help making a decision, visit:   https://bit.ly/kx61145.  Prostate cancer screening test: If you have a prostate, ask your care team if a prostate cancer screening test (PSA) at age 55 is right for you.  Lung cancer screening: If you are a current or former smoker ages 50 to 80, ask your care team if ongoing lung cancer screenings are right for you.  For informational purposes only. Not to replace the advice of your health care provider. Copyright   2023 [employer]. All rights reserved. Clinically reviewed by the [facility] Transitions Program. [employer] [id number] - REV 01/24.

## 2024-08-06 NOTE — PROGRESS NOTES
Preventive Care Visit  [facility]  Emmanuel Jordan MD, Family Medicine  Aug 6, 2024      ASSESSMENT / PLAN:  (Z00.00) Encounter for Medicare annual wellness exam  (primary encounter diagnosis)  Comment: generally healthy and normal exam/labs. No falls/memory ok and good support system/active  Plan: vitmainD. Continue exercise. Call/email with questions/concerns      (R09.81) Sinus congestion  Comment: stable  Plan: fluticasone (FLONASE) 50 MCG/ACT nasal spray        Prn.     (I10) Hypertension goal BP (blood pressure) < 140/90  Comment: stable  Plan: amLODIPine (NORVASC) 5 MG tablet, losartan         (COZAAR) 50 MG tablet        Continue meds. Chest pain or shortness of breath to er. Self-monitor. Return to clinic if worse. Continue exercise.     (E78.5) Hyperlipidemia LDL goal <130  Comment: over treatment. No history mi/cva/cad  Plan: atorvastatin (LIPITOR) 10 MG tablet        Will 1/2 dosage. Continue exercise.       Tanya Meng is a 78 year old, presenting for the following:  Wellness Visit  Follow-up htn, ed, hyperglycemia, ALLERGIC RHINITIS,barrets and high cholesterol. No history mi/cva or TIA. Taking 1/4 asa.  Exercise - walking daily. Eating salmon.   Lift weights. Rubber bands. Wife's some health issues- improving. vitaminD.   . 3 kids, 3 grandkids - in [place]. Normal egd.   No nausea, vomiting or diarrhea or black/bloody stools. No urine changes or hematuria. History IBS = stable.   Gerd ok or dysphasia. No chest pain or shortness of breath.   Great exercise tolerance.  Outside blood pressure reading ok.   Lower carb diet. No pop.   Emotionally doing ok.   No falls. Memory doing ok.       8/6/2024    11:12 AM   Additional Questions   Roomed by Starla         Health Care Directive  Patient does not have a Health Care Directive or Living Will: Discussed advance care planning with patient; information given to patient to review.        8/6/2024   General Health   How would  you rate your overall physical health? Good   Feel stress (tense, anxious, or unable to sleep) Rather much      (!) STRESS CONCERN      8/6/2024   Nutrition   Diet: Low salt    Carbohydrate counting       Multiple values from one day are sorted in reverse-chronological order         8/6/2024   Exercise   Days per week of moderate/strenous exercise 7 days            8/6/2024   Social Factors   Frequency of gathering with friends or relatives Once a week   Worry food won't last until get money to buy more No   Food not last or not have enough money for food? No   Do you have housing? (Housing is defined as stable permanent housing and does not include staying ouside in a car, in a tent, in an abandoned building, in an overnight shelter, or couch-surfing.) Yes   Are you worried about losing your housing? No   Lack of transportation? No   Unable to get utilities (heat,electricity)? No            8/6/2024   Fall Risk   Fallen 2 or more times in the past year? No   Trouble with walking or balance? No             8/6/2024   Activities of Daily Living- Home Safety   Needs help with the following daily activites None of the above   Safety concerns in the home Throw rugs in the hallway            8/6/2024   Dental   Dentist two times every year? Yes            8/6/2024   Hearing Screening   Hearing concerns? (!) I NEED TO ASK PEOPLE TO SPEAK UP OR REPEAT THEMSELVES.    (!) IT'S HARD TO FOLLOW A CONVERSATION IN A NOISY RESTAURANT OR CROWDED ROOM.    (!) TROUBLE UNDERSTANDING SOFT OR WHISPERED SPEECH.       Multiple values from one day are sorted in reverse-chronological order         8/6/2024   Driving Risk Screening   Patient/family members have concerns about driving No            8/6/2024   General Alertness/Fatigue Screening   Have you been more tired than usual lately? No            8/6/2024   Urinary Incontinence Screening   Bothered by leaking urine in past 6 months No            8/6/2024   TB Screening   Were you born  outside of the [place]? No            Today's PHQ-2 Score:       2024    11:12 AM   PHQ-2 (  [employer])   Q1: Little interest or pleasure in doing things 1   Q2: Feeling down, depressed or hopeless 0   PHQ-2 Score 1   Q1: Little interest or pleasure in doing things Several days   Q2: Feeling down, depressed or hopeless Not at all   PHQ-2 Score 1           2024   Substance Use   Alcohol more than 3/day or more than 7/wk No   Do you have a current opioid prescription? No   How severe/bad is pain from 1 to 10? 7/10   Do you use any other substances recreationally? No        Social History     Tobacco Use    Smoking status: Former     Current packs/day: 0.00     Average packs/day: 0.5 packs/day for 18.1 years (9.0 ttl pk-yrs)     Types: Cigarettes, Cigars, Pipe, Dip, chew, snus or snuff     Start date: 10/1/1961     Quit date: 10/31/1979     Years since quittin.7    Smokeless tobacco: Former     Quit date: 10/1/1970    Tobacco comments:        Vaping Use    Vaping status: Never Used   Substance Use Topics    Alcohol use: Yes     Alcohol/week: 0.8 standard drinks of alcohol     Comment: 1-2 beer daily    Drug use: No       ASCVD Risk   The ASCVD Risk score (Daniel DK, et al., 2019) failed to calculate for the following reasons:    The valid total cholesterol range is 130 to 320 mg/dL            Reviewed and updated as needed this visit by Provider                      Current providers sharing in care for this patient include:  Patient Care Team:  Emmanuel Jordan MD as PCP - General  Ervin Honeycutt MD as Assigned Surgical Provider  Emmanuel Jordan MD as Assigned PCP    The following health maintenance items are reviewed in Epic and correct as of today:  Health Maintenance   Topic Date Due    URINE DRUG SCREEN  Never done    ANNUAL REVIEW OF HM ORDERS  Never done    RSV VACCINE (Pregnancy & 60+) (1 - 1-dose 60+ series) Never done    COVID-19 Vaccine (2023- season) 2023     "INFLUENZA VACCINE (1) 09/01/2024    BMP  08/02/2025    LIPID  08/02/2025    MEDICARE ANNUAL WELLNESS VISIT  08/06/2025    FALL RISK ASSESSMENT  08/06/2025    GLUCOSE  08/02/2027    ADVANCE CARE PLANNING  08/15/2027    COLORECTAL CANCER SCREENING  11/17/2027    DTAP/TDAP/TD IMMUNIZATION (3 - Td or Tdap) 10/03/2028    HEPATITIS C SCREENING  Completed    PHQ-2 (once per calendar year)  Completed    Pneumococcal Vaccine: 65+ Years  Completed    ZOSTER IMMUNIZATION  Completed    IPV IMMUNIZATION  Aged Out    HPV IMMUNIZATION  Aged Out    MENINGITIS IMMUNIZATION  Aged Out    RSV MONOCLONAL ANTIBODY  Aged Out            Objective    Exam  /85   Pulse 97   Temp 98.2  F (36.8  C) (Oral)   Resp 18   Ht 1.727 m (5' 8\")   Wt 86.2 kg (190 lb)   SpO2 96%   BMI 28.89 kg/m     Estimated body mass index is 28.89 kg/m  as calculated from the following:    Height as of this encounter: 1.727 m (5' 8\").    Weight as of this encounter: 86.2 kg (190 lb).    Physical Exam  GENERAL: alert and no distress  EYES: Eyes grossly normal to inspection, PERRL and conjunctivae and sclerae normal  HENT: ear canals and TM's normal, nose and mouth without ulcers or lesions  NECK: no adenopathy, no asymmetry, masses, or scars  RESP: lungs clear to auscultation - no rales, rhonchi or wheezes  BREAST: normal without masses, tenderness or nipple discharge and no palpable axillary masses or adenopathy  CV: regular rate and rhythm, normal S1 S2, no S3 or S4, no murmur, click or rub, no peripheral edema   ABDOMEN: soft, nontender, no hepatosplenomegaly, no masses and bowel sounds normal  MS: no gross musculoskeletal defects noted, no edema  SKIN: no suspicious lesions or rashes  NEURO: Normal strength and tone, mentation intact and speech normal  PSYCH: mentation appears normal, affect normal/bright  LYMPH: no cervical, supraclavicular, axillary adenopathy    Normal cognition based on my direct observation during interview and exam.    Signed " Electronically by: Emmanuel Jordan MD

## 2024-08-12 ENCOUNTER — TELEPHONE (OUTPATIENT)
Dept: FAMILY MEDICINE | Facility: CLINIC | Age: 79
End: 2024-08-12
Payer: MEDICARE

## 2024-08-12 NOTE — TELEPHONE ENCOUNTER
Emmanuel Arana, [employer] representative calling to request confirmation of dosage and instructions on Atorvastatin prescription.      Emmanuel connected writer with certified pharmacy technician Mariely Prasad to relay the requested information. Writer relayed information below.        Mariely verbalized understanding and read back to writer.     CYRUS Garrett  [facility]

## 2024-08-15 ENCOUNTER — DOCUMENTATION ONLY (OUTPATIENT)
Dept: OTHER | Facility: CLINIC | Age: 79
End: 2024-08-15
Payer: MEDICARE

## 2024-08-26 NOTE — NURSING NOTE
"Chief Complaint   Patient presents with     Gastrophageal Reflux     Results     bone scan     Health Maintenance     screening       Initial BP (!) 164/91   Pulse 91   Temp 97.3  F (36.3  C) (Oral)   Resp 20   Ht 1.727 m (5' 8\")   Wt 84.8 kg (187 lb)   SpO2 98%   BMI 28.43 kg/m   Estimated body mass index is 28.43 kg/m  as calculated from the following:    Height as of this encounter: 1.727 m (5' 8\").    Weight as of this encounter: 84.8 kg (187 lb).  Medication Reconciliation: complete  Gianna Alejandro, HANK  " no

## 2025-01-21 ENCOUNTER — OFFICE VISIT (OUTPATIENT)
Dept: OPHTHALMOLOGY | Facility: CLINIC | Age: 80
End: 2025-01-21
Payer: MEDICARE

## 2025-01-21 DIAGNOSIS — H52.4 PRESBYOPIA: ICD-10-CM

## 2025-01-21 DIAGNOSIS — Z01.01 ENCOUNTER FOR EXAMINATION OF EYES AND VISION WITH ABNORMAL FINDINGS: ICD-10-CM

## 2025-01-21 DIAGNOSIS — Z96.1 PSEUDOPHAKIA: Primary | ICD-10-CM

## 2025-01-21 DIAGNOSIS — H35.3131 EARLY DRY STAGE NONEXUDATIVE AGE-RELATED MACULAR DEGENERATION OF BOTH EYES: ICD-10-CM

## 2025-01-21 DIAGNOSIS — H40.003 GLAUCOMA SUSPECT OF BOTH EYES: ICD-10-CM

## 2025-01-21 DIAGNOSIS — H43.813 POSTERIOR VITREOUS DETACHMENT OF BOTH EYES: ICD-10-CM

## 2025-01-21 PROCEDURE — 92015 DETERMINE REFRACTIVE STATE: CPT | Mod: GY | Performed by: OPHTHALMOLOGY

## 2025-01-21 PROCEDURE — 92014 COMPRE OPH EXAM EST PT 1/>: CPT | Performed by: OPHTHALMOLOGY

## 2025-01-21 ASSESSMENT — TONOMETRY
OD_IOP_MMHG: 17
IOP_METHOD: APPLANATION
OS_IOP_MMHG: 18

## 2025-01-21 ASSESSMENT — REFRACTION_MANIFEST
OS_CYLINDER: +2.00
OD_SPHERE: -0.25
OS_AXIS: 157
OS_ADD: +2.75
OD_AXIS: 020
OD_CYLINDER: +1.75
OS_SPHERE: -2.00
OD_ADD: +2.75

## 2025-01-21 ASSESSMENT — CUP TO DISC RATIO
OS_RATIO: 0.4
OD_RATIO: 0.5

## 2025-01-21 ASSESSMENT — VISUAL ACUITY
OS_PH_SC: 20/25
OS_SC: 20/50
OD_SC: 20/20
OD_SC+: -2
METHOD: SNELLEN - LINEAR
OS_SC+: -1

## 2025-01-21 ASSESSMENT — CONF VISUAL FIELD
OS_SUPERIOR_TEMPORAL_RESTRICTION: 0
OS_NORMAL: 1
OS_SUPERIOR_NASAL_RESTRICTION: 0
OD_INFERIOR_TEMPORAL_RESTRICTION: 0
OD_INFERIOR_NASAL_RESTRICTION: 0
OD_SUPERIOR_TEMPORAL_RESTRICTION: 0
OS_INFERIOR_NASAL_RESTRICTION: 0
OD_NORMAL: 1
OS_INFERIOR_TEMPORAL_RESTRICTION: 0
METHOD: COUNTING FINGERS
OD_SUPERIOR_NASAL_RESTRICTION: 0

## 2025-01-21 ASSESSMENT — REFRACTION_WEARINGRX
OS_AXIS: 164
OD_CYLINDER: +1.50
SPECS_TYPE: READERS
OS_SPHERE: +1.00
OD_AXIS: 020
OS_CYLINDER: +1.50
OD_SPHERE: +2.50

## 2025-01-21 ASSESSMENT — EXTERNAL EXAM - RIGHT EYE: OD_EXAM: ROSACEA

## 2025-01-21 NOTE — PROGRESS NOTES
"Current Eye Medications:  systane balance - both eyes AS NEEDED. AREDS2 4xper week, Lutein the other 3 days.     Subjective:  comprehensive eye exams - has multiple pairs of glasses, all work well.  Only brought prescription readers with today but able to drive without any glasses. Vision is overall stable with and without correction.  Occasional discomfort right eye, sometimes if his eyelid gets stuck on his pillow at night he can wake up with a watery eye. Use of systane helps.     Objective:  See Ophthalmology Exam.      Assessment:   Stable eye exam.      ICD-10-CM    1. Pseudophakia, ou; Yag Caps, os  Z96.1       2. Glaucoma suspect of both eyes  H40.003       3. Early dry stage nonexudative age-related macular degeneration of both eyes  H35.3131       4. Posterior vitreous detachment of both eyes  H43.813       5. Encounter for examination of eyes and vision with abnormal findings  Z01.01       6. Presbyopia  H52.4           Plan:  Glasses prescription given - optional    May use artificial tears up to four times a day (like Refresh Optive, Systane Balance, or TheraTears. Avoid \"get the red out\" drops and generic artifical tears).     Can use a thicker gel tear or ointment at bedtime to prevent your eyes drying out during the night. (Refresh Celluvisc, Systane gel, Genteal gel tears, Refresh PM ointment)     Possible clouding of posterior capsule right eye discussed.     Call in September 2025 for an appointment in January 2026 for Complete Exam    Dr. Honeycutt [phone]         "

## 2025-01-21 NOTE — LETTER
"1/21/2025      Taqueria Bone  [address] [facility]  [place] [address]      Dear Colleague,    Thank you for referring your patient, Taqueria Bone, to the [facility]. Please see a copy of my visit note below.    Current Eye Medications:  systane balance - both eyes AS NEEDED. AREDS2 4xper week, Lutein the other 3 days.     Subjective:  comprehensive eye exams - has multiple pairs of glasses, all work well.  Only brought prescription readers with today but able to drive without any glasses. Vision is overall stable with and without correction.  Occasional discomfort right eye, sometimes if his eyelid gets stuck on his pillow at night he can wake up with a watery eye. Use of systane helps.     Objective:  See Ophthalmology Exam.      Assessment:   Stable eye exam.      ICD-10-CM    1. Pseudophakia, ou; Yag Caps, os  Z96.1       2. Glaucoma suspect of both eyes  H40.003       3. Early dry stage nonexudative age-related macular degeneration of both eyes  H35.3131       4. Posterior vitreous detachment of both eyes  H43.813       5. Encounter for examination of eyes and vision with abnormal findings  Z01.01       6. Presbyopia  H52.4           Plan:  Glasses prescription given - optional    May use artificial tears up to four times a day (like Refresh Optive, Systane Balance, or TheraTears. Avoid \"get the red out\" drops and generic artifical tears).     Can use a thicker gel tear or ointment at bedtime to prevent your eyes drying out during the night. (Refresh Celluvisc, Systane gel, Genteal gel tears, Refresh PM ointment)     Possible clouding of posterior capsule right eye discussed.     Call in September 2025 for an appointment in January 2026 for Complete Exam    Dr. Honeycutt [phone]           Again, thank you for allowing me to participate in the care of your patient.        Sincerely,        Ervin Honeycutt MD    Electronically signed" Message sent to patient regarding the below.    ----- Message from Carlin Eisenberg MD sent at 4/22/2024  7:04 AM CDT -----  Please contact patient     Overall his labs are normal.  The inflammatory arthritis testing and labs looking for fatigue all have come back normal.  1. He does have a new low platelet count.  This is nonspecific but I would recommend that we repeat a blood test in one month.  I would not make any changes in diet or activities.  2. He does have a low vitamin-D level.  I would encourage him to start taking a supplement, 2000 units daily.  I will put in a future lab order.

## 2025-01-21 NOTE — PATIENT INSTRUCTIONS
"Glasses prescription given - optional    May use artificial tears up to four times a day (like Refresh Optive, Systane Balance, or TheraTears. Avoid \"get the red out\" drops and generic artifical tears).     Can use a thicker gel tear or ointment at bedtime to prevent your eyes drying out during the night. (Refresh Celluvisc, Systane gel, Genteal gel tears, Refresh PM ointment)     Possible clouding of posterior capsule right eye discussed.     Call in September 2025 for an appointment in January 2026  for Complete Exam    Dr. Honeycutt [phone]    "

## 2025-04-16 DIAGNOSIS — I10 HYPERTENSION GOAL BP (BLOOD PRESSURE) < 140/90: ICD-10-CM

## 2025-04-16 RX ORDER — AMLODIPINE BESYLATE 5 MG/1
10 TABLET ORAL DAILY
Qty: 180 TABLET | Refills: 0 | Status: SHIPPED | OUTPATIENT
Start: 2025-04-16

## 2025-07-15 DIAGNOSIS — I10 HYPERTENSION GOAL BP (BLOOD PRESSURE) < 140/90: ICD-10-CM

## 2025-07-15 RX ORDER — AMLODIPINE BESYLATE 5 MG/1
10 TABLET ORAL DAILY
Qty: 180 TABLET | Refills: 0 | Status: SHIPPED | OUTPATIENT
Start: 2025-07-15

## 2025-08-08 SDOH — HEALTH STABILITY: PHYSICAL HEALTH: ON AVERAGE, HOW MANY DAYS PER WEEK DO YOU ENGAGE IN MODERATE TO STRENUOUS EXERCISE (LIKE A BRISK WALK)?: 7 DAYS

## 2025-08-08 SDOH — HEALTH STABILITY: PHYSICAL HEALTH: ON AVERAGE, HOW MANY MINUTES DO YOU ENGAGE IN EXERCISE AT THIS LEVEL?: 50 MIN

## 2025-08-08 ASSESSMENT — SOCIAL DETERMINANTS OF HEALTH (SDOH): HOW OFTEN DO YOU GET TOGETHER WITH FRIENDS OR RELATIVES?: ONCE A WEEK

## 2025-08-11 ENCOUNTER — OFFICE VISIT (OUTPATIENT)
Dept: FAMILY MEDICINE | Facility: CLINIC | Age: 80
End: 2025-08-11
Payer: MEDICARE

## 2025-08-11 VITALS
HEART RATE: 92 BPM | OXYGEN SATURATION: 96 % | DIASTOLIC BLOOD PRESSURE: 79 MMHG | RESPIRATION RATE: 12 BRPM | TEMPERATURE: 98.1 F | HEIGHT: 68 IN | SYSTOLIC BLOOD PRESSURE: 138 MMHG | BODY MASS INDEX: 28.64 KG/M2 | WEIGHT: 189 LBS

## 2025-08-11 DIAGNOSIS — R42 VERTIGO: ICD-10-CM

## 2025-08-11 DIAGNOSIS — N52.9 ERECTILE DYSFUNCTION, UNSPECIFIED ERECTILE DYSFUNCTION TYPE: ICD-10-CM

## 2025-08-11 DIAGNOSIS — K22.70 BARRETT'S ESOPHAGUS WITHOUT DYSPLASIA: ICD-10-CM

## 2025-08-11 DIAGNOSIS — K21.9 GASTROESOPHAGEAL REFLUX DISEASE WITHOUT ESOPHAGITIS: ICD-10-CM

## 2025-08-11 DIAGNOSIS — I10 ESSENTIAL HYPERTENSION WITH GOAL BLOOD PRESSURE LESS THAN 140/90: ICD-10-CM

## 2025-08-11 DIAGNOSIS — R73.02 IGT (IMPAIRED GLUCOSE TOLERANCE): ICD-10-CM

## 2025-08-11 DIAGNOSIS — I10 HYPERTENSION GOAL BP (BLOOD PRESSURE) < 140/90: ICD-10-CM

## 2025-08-11 DIAGNOSIS — Z00.00 ENCOUNTER FOR MEDICARE ANNUAL WELLNESS EXAM: Primary | ICD-10-CM

## 2025-08-11 LAB
EST. AVERAGE GLUCOSE BLD GHB EST-MCNC: 111 MG/DL
HBA1C MFR BLD: 5.5 % (ref 0–5.6)

## 2025-08-11 PROCEDURE — 99214 OFFICE O/P EST MOD 30 MIN: CPT | Mod: 25 | Performed by: FAMILY MEDICINE

## 2025-08-11 PROCEDURE — 83036 HEMOGLOBIN GLYCOSYLATED A1C: CPT | Performed by: FAMILY MEDICINE

## 2025-08-11 PROCEDURE — 1126F AMNT PAIN NOTED NONE PRSNT: CPT | Performed by: FAMILY MEDICINE

## 2025-08-11 PROCEDURE — G0439 PPPS, SUBSEQ VISIT: HCPCS | Performed by: FAMILY MEDICINE

## 2025-08-11 PROCEDURE — 80048 BASIC METABOLIC PNL TOTAL CA: CPT | Performed by: FAMILY MEDICINE

## 2025-08-11 PROCEDURE — 3078F DIAST BP <80 MM HG: CPT | Performed by: FAMILY MEDICINE

## 2025-08-11 PROCEDURE — 36415 COLL VENOUS BLD VENIPUNCTURE: CPT | Performed by: FAMILY MEDICINE

## 2025-08-11 PROCEDURE — 3075F SYST BP GE 130 - 139MM HG: CPT | Performed by: FAMILY MEDICINE

## 2025-08-11 RX ORDER — AMLODIPINE BESYLATE 5 MG/1
10 TABLET ORAL DAILY
Qty: 180 TABLET | Refills: 0 | Status: SHIPPED | OUTPATIENT
Start: 2025-08-11

## 2025-08-11 RX ORDER — OMEPRAZOLE 20 MG/1
20 CAPSULE, DELAYED RELEASE ORAL DAILY
Qty: 90 CAPSULE | Refills: 3 | Status: SHIPPED | OUTPATIENT
Start: 2025-08-11

## 2025-08-11 RX ORDER — MECLIZINE HYDROCHLORIDE 25 MG/1
25 TABLET ORAL 3 TIMES DAILY PRN
Qty: 90 TABLET | Refills: 1 | Status: SHIPPED | OUTPATIENT
Start: 2025-08-11

## 2025-08-11 RX ORDER — LOSARTAN POTASSIUM 100 MG/1
100 TABLET ORAL DAILY
Qty: 90 TABLET | Refills: 3 | Status: SHIPPED | OUTPATIENT
Start: 2025-08-11

## 2025-08-11 RX ORDER — SILDENAFIL CITRATE 20 MG/1
TABLET ORAL
Qty: 30 TABLET | Refills: 2 | Status: SHIPPED | OUTPATIENT
Start: 2025-08-11

## 2025-08-11 ASSESSMENT — PAIN SCALES - GENERAL: PAINLEVEL_OUTOF10: NO PAIN (0)

## 2025-08-12 LAB
ANION GAP SERPL CALCULATED.3IONS-SCNC: 10 MMOL/L (ref 7–15)
BUN SERPL-MCNC: 14.2 MG/DL (ref 8–23)
CALCIUM SERPL-MCNC: 10.1 MG/DL (ref 8.8–10.4)
CHLORIDE SERPL-SCNC: 102 MMOL/L (ref 98–107)
CREAT SERPL-MCNC: 0.68 MG/DL (ref 0.67–1.17)
EGFRCR SERPLBLD CKD-EPI 2021: >90 ML/MIN/1.73M2
GLUCOSE SERPL-MCNC: 105 MG/DL (ref 70–99)
HCO3 SERPL-SCNC: 27 MMOL/L (ref 22–29)
POTASSIUM SERPL-SCNC: 4.4 MMOL/L (ref 3.4–5.3)
SODIUM SERPL-SCNC: 139 MMOL/L (ref 135–145)